# Patient Record
Sex: FEMALE | Race: BLACK OR AFRICAN AMERICAN | Employment: OTHER | URBAN - METROPOLITAN AREA
[De-identification: names, ages, dates, MRNs, and addresses within clinical notes are randomized per-mention and may not be internally consistent; named-entity substitution may affect disease eponyms.]

---

## 2017-01-01 ENCOUNTER — OFFICE VISIT (OUTPATIENT)
Dept: INTERNAL MEDICINE CLINIC | Age: 82
End: 2017-01-01

## 2017-01-01 ENCOUNTER — TELEPHONE (OUTPATIENT)
Dept: INTERNAL MEDICINE CLINIC | Age: 82
End: 2017-01-01

## 2017-01-01 ENCOUNTER — HOSPITAL ENCOUNTER (OUTPATIENT)
Dept: LAB | Age: 82
Discharge: HOME | End: 2017-01-17
Payer: MEDICARE

## 2017-01-01 ENCOUNTER — HOSPITAL ENCOUNTER (INPATIENT)
Age: 82
LOS: 5 days | Discharge: HOME | DRG: 189 | End: 2017-06-06
Attending: EMERGENCY MEDICINE | Admitting: INTERNAL MEDICINE
Payer: MEDICARE

## 2017-01-01 ENCOUNTER — OFFICE VISIT (OUTPATIENT)
Dept: RHEUMATOLOGY | Age: 82
End: 2017-01-01

## 2017-01-01 ENCOUNTER — HOSPITAL ENCOUNTER (EMERGENCY)
Age: 82
Discharge: HOME | End: 2017-08-30
Attending: EMERGENCY MEDICINE
Payer: MEDICARE

## 2017-01-01 ENCOUNTER — APPOINTMENT (OUTPATIENT)
Dept: GENERAL RADIOLOGY | Age: 82
DRG: 189 | End: 2017-01-01
Attending: EMERGENCY MEDICINE
Payer: MEDICARE

## 2017-01-01 ENCOUNTER — PATIENT OUTREACH (OUTPATIENT)
Dept: INTERNAL MEDICINE CLINIC | Age: 82
End: 2017-01-01

## 2017-01-01 ENCOUNTER — HOSPITAL ENCOUNTER (EMERGENCY)
Age: 82
Discharge: HOME | DRG: 189 | End: 2017-05-29
Attending: EMERGENCY MEDICINE
Payer: MEDICARE

## 2017-01-01 ENCOUNTER — APPOINTMENT (OUTPATIENT)
Dept: INTERNAL MEDICINE CLINIC | Age: 82
End: 2017-01-01

## 2017-01-01 ENCOUNTER — APPOINTMENT (OUTPATIENT)
Dept: GENERAL RADIOLOGY | Age: 82
DRG: 871 | End: 2017-01-01
Attending: INTERNAL MEDICINE
Payer: MEDICARE

## 2017-01-01 ENCOUNTER — HOSPITAL ENCOUNTER (EMERGENCY)
Age: 82
Discharge: HOME | End: 2017-09-29
Attending: EMERGENCY MEDICINE
Payer: MEDICARE

## 2017-01-01 ENCOUNTER — APPOINTMENT (OUTPATIENT)
Dept: ULTRASOUND IMAGING | Age: 82
DRG: 871 | End: 2017-01-01
Attending: INTERNAL MEDICINE
Payer: MEDICARE

## 2017-01-01 ENCOUNTER — APPOINTMENT (OUTPATIENT)
Dept: GENERAL RADIOLOGY | Age: 82
End: 2017-01-01
Attending: EMERGENCY MEDICINE
Payer: MEDICARE

## 2017-01-01 ENCOUNTER — APPOINTMENT (OUTPATIENT)
Dept: GENERAL RADIOLOGY | Age: 82
DRG: 871 | End: 2017-01-01
Attending: EMERGENCY MEDICINE
Payer: MEDICARE

## 2017-01-01 ENCOUNTER — CLINICAL SUPPORT (OUTPATIENT)
Dept: INTERNAL MEDICINE CLINIC | Age: 82
End: 2017-01-01

## 2017-01-01 ENCOUNTER — HOSPITAL ENCOUNTER (INPATIENT)
Age: 82
LOS: 4 days | DRG: 871 | End: 2017-11-17
Attending: EMERGENCY MEDICINE | Admitting: INTERNAL MEDICINE
Payer: MEDICARE

## 2017-01-01 ENCOUNTER — HOSPITAL ENCOUNTER (OUTPATIENT)
Dept: LAB | Age: 82
Discharge: HOME | End: 2017-06-22
Payer: MEDICARE

## 2017-01-01 ENCOUNTER — LAB ONLY (OUTPATIENT)
Dept: INTERNAL MEDICINE CLINIC | Age: 82
End: 2017-01-01

## 2017-01-01 VITALS
SYSTOLIC BLOOD PRESSURE: 110 MMHG | HEIGHT: 62 IN | WEIGHT: 156.31 LBS | RESPIRATION RATE: 16 BRPM | TEMPERATURE: 98.5 F | HEART RATE: 65 BPM | DIASTOLIC BLOOD PRESSURE: 75 MMHG | OXYGEN SATURATION: 95 % | BODY MASS INDEX: 28.76 KG/M2

## 2017-01-01 VITALS
BODY MASS INDEX: 26.66 KG/M2 | SYSTOLIC BLOOD PRESSURE: 132 MMHG | OXYGEN SATURATION: 95 % | HEART RATE: 73 BPM | HEIGHT: 65 IN | DIASTOLIC BLOOD PRESSURE: 66 MMHG | WEIGHT: 160 LBS | TEMPERATURE: 97 F | RESPIRATION RATE: 14 BRPM

## 2017-01-01 VITALS
WEIGHT: 157 LBS | TEMPERATURE: 97.5 F | OXYGEN SATURATION: 94 % | SYSTOLIC BLOOD PRESSURE: 168 MMHG | HEART RATE: 91 BPM | RESPIRATION RATE: 16 BRPM | BODY MASS INDEX: 26.16 KG/M2 | HEIGHT: 65 IN | DIASTOLIC BLOOD PRESSURE: 81 MMHG

## 2017-01-01 VITALS
OXYGEN SATURATION: 97 % | WEIGHT: 140 LBS | HEIGHT: 65 IN | HEART RATE: 76 BPM | BODY MASS INDEX: 23.32 KG/M2 | TEMPERATURE: 97.8 F | SYSTOLIC BLOOD PRESSURE: 145 MMHG | DIASTOLIC BLOOD PRESSURE: 48 MMHG | RESPIRATION RATE: 16 BRPM

## 2017-01-01 VITALS
DIASTOLIC BLOOD PRESSURE: 63 MMHG | OXYGEN SATURATION: 94 % | TEMPERATURE: 97.7 F | WEIGHT: 154.6 LBS | SYSTOLIC BLOOD PRESSURE: 134 MMHG | HEART RATE: 87 BPM | RESPIRATION RATE: 18 BRPM | BODY MASS INDEX: 25.76 KG/M2 | HEIGHT: 65 IN

## 2017-01-01 VITALS
TEMPERATURE: 98.9 F | SYSTOLIC BLOOD PRESSURE: 122 MMHG | BODY MASS INDEX: 26.56 KG/M2 | WEIGHT: 159.39 LBS | HEIGHT: 65 IN | DIASTOLIC BLOOD PRESSURE: 67 MMHG | OXYGEN SATURATION: 92 % | RESPIRATION RATE: 24 BRPM | HEART RATE: 126 BPM

## 2017-01-01 VITALS
OXYGEN SATURATION: 98 % | BODY MASS INDEX: 25.1 KG/M2 | WEIGHT: 159.9 LBS | SYSTOLIC BLOOD PRESSURE: 127 MMHG | HEIGHT: 67 IN | HEART RATE: 131 BPM | RESPIRATION RATE: 12 BRPM | TEMPERATURE: 97.8 F | DIASTOLIC BLOOD PRESSURE: 64 MMHG

## 2017-01-01 VITALS
WEIGHT: 157.5 LBS | SYSTOLIC BLOOD PRESSURE: 124 MMHG | BODY MASS INDEX: 26.24 KG/M2 | HEART RATE: 74 BPM | TEMPERATURE: 98.5 F | DIASTOLIC BLOOD PRESSURE: 57 MMHG | HEIGHT: 65 IN | OXYGEN SATURATION: 96 % | RESPIRATION RATE: 14 BRPM

## 2017-01-01 VITALS
DIASTOLIC BLOOD PRESSURE: 51 MMHG | RESPIRATION RATE: 14 BRPM | BODY MASS INDEX: 25.41 KG/M2 | TEMPERATURE: 97.4 F | WEIGHT: 152.5 LBS | SYSTOLIC BLOOD PRESSURE: 125 MMHG | OXYGEN SATURATION: 95 % | HEART RATE: 75 BPM | HEIGHT: 65 IN

## 2017-01-01 VITALS
RESPIRATION RATE: 18 BRPM | HEART RATE: 84 BPM | HEIGHT: 62 IN | OXYGEN SATURATION: 98 % | SYSTOLIC BLOOD PRESSURE: 158 MMHG | WEIGHT: 147 LBS | DIASTOLIC BLOOD PRESSURE: 67 MMHG | BODY MASS INDEX: 27.05 KG/M2 | TEMPERATURE: 98.1 F

## 2017-01-01 VITALS
OXYGEN SATURATION: 99 % | TEMPERATURE: 96.9 F | BODY MASS INDEX: 26.66 KG/M2 | DIASTOLIC BLOOD PRESSURE: 58 MMHG | RESPIRATION RATE: 16 BRPM | HEIGHT: 65 IN | SYSTOLIC BLOOD PRESSURE: 142 MMHG | WEIGHT: 160 LBS | HEART RATE: 68 BPM

## 2017-01-01 DIAGNOSIS — I25.10 CORONARY ARTERY DISEASE INVOLVING NATIVE CORONARY ARTERY OF NATIVE HEART, ANGINA PRESENCE UNSPECIFIED: ICD-10-CM

## 2017-01-01 DIAGNOSIS — J06.9 ACUTE UPPER RESPIRATORY INFECTION: Primary | ICD-10-CM

## 2017-01-01 DIAGNOSIS — N39.41 URGE INCONTINENCE OF URINE: Primary | ICD-10-CM

## 2017-01-01 DIAGNOSIS — Z86.73 CEREBROVASCULAR DISEASE, ARTERIOSCLEROTIC, POST-STROKE: ICD-10-CM

## 2017-01-01 DIAGNOSIS — Z23 ENCOUNTER FOR IMMUNIZATION: ICD-10-CM

## 2017-01-01 DIAGNOSIS — Z00.00 MEDICARE ANNUAL WELLNESS VISIT, SUBSEQUENT: Primary | ICD-10-CM

## 2017-01-01 DIAGNOSIS — I25.5 ISCHEMIC CARDIOMYOPATHY: ICD-10-CM

## 2017-01-01 DIAGNOSIS — R32 URINARY INCONTINENCE, UNSPECIFIED TYPE: Primary | ICD-10-CM

## 2017-01-01 DIAGNOSIS — E78.00 HYPERCHOLESTEROLEMIA: ICD-10-CM

## 2017-01-01 DIAGNOSIS — R32 URINARY INCONTINENCE, UNSPECIFIED TYPE: ICD-10-CM

## 2017-01-01 DIAGNOSIS — M85.80 OSTEOPENIA: Primary | ICD-10-CM

## 2017-01-01 DIAGNOSIS — J45.41 REACTIVE AIRWAY DISEASE, MODERATE PERSISTENT, WITH ACUTE EXACERBATION: ICD-10-CM

## 2017-01-01 DIAGNOSIS — N18.30 BENIGN HYPERTENSION WITH CHRONIC KIDNEY DISEASE, STAGE III (HCC): Primary | ICD-10-CM

## 2017-01-01 DIAGNOSIS — Z13.5 SCREENING FOR GLAUCOMA: ICD-10-CM

## 2017-01-01 DIAGNOSIS — D64.9 ANEMIA, UNSPECIFIED TYPE: ICD-10-CM

## 2017-01-01 DIAGNOSIS — I12.9 BENIGN HYPERTENSION WITH CHRONIC KIDNEY DISEASE, STAGE III (HCC): Primary | ICD-10-CM

## 2017-01-01 DIAGNOSIS — J18.9 COMMUNITY ACQUIRED PNEUMONIA OF RIGHT LOWER LOBE OF LUNG: Primary | ICD-10-CM

## 2017-01-01 DIAGNOSIS — R35.0 URINARY FREQUENCY: Primary | ICD-10-CM

## 2017-01-01 DIAGNOSIS — I67.2 CEREBROVASCULAR DISEASE, ARTERIOSCLEROTIC, POST-STROKE: ICD-10-CM

## 2017-01-01 DIAGNOSIS — J96.01 ACUTE RESPIRATORY FAILURE WITH HYPOXIA (HCC): ICD-10-CM

## 2017-01-01 DIAGNOSIS — R73.02 GLUCOSE INTOLERANCE (IMPAIRED GLUCOSE TOLERANCE): ICD-10-CM

## 2017-01-01 DIAGNOSIS — I50.22 CHRONIC SYSTOLIC CONGESTIVE HEART FAILURE (HCC): ICD-10-CM

## 2017-01-01 DIAGNOSIS — J20.9 ACUTE BRONCHITIS, UNSPECIFIED ORGANISM: Primary | ICD-10-CM

## 2017-01-01 DIAGNOSIS — M1A.39X1 CHRONIC GOUT DUE TO RENAL IMPAIRMENT OF MULTIPLE SITES WITH TOPHUS: ICD-10-CM

## 2017-01-01 DIAGNOSIS — J20.9 ACUTE BRONCHITIS, UNSPECIFIED ORGANISM: ICD-10-CM

## 2017-01-01 DIAGNOSIS — N95.9 POST MENOPAUSAL PROBLEMS: ICD-10-CM

## 2017-01-01 DIAGNOSIS — M10.9 GOUT OF MULTIPLE SITES, UNSPECIFIED CAUSE, UNSPECIFIED CHRONICITY: ICD-10-CM

## 2017-01-01 DIAGNOSIS — R52 BODY ACHES: Primary | ICD-10-CM

## 2017-01-01 DIAGNOSIS — A41.9 SEPSIS, DUE TO UNSPECIFIED ORGANISM: ICD-10-CM

## 2017-01-01 DIAGNOSIS — R35.0 URINARY FREQUENCY: ICD-10-CM

## 2017-01-01 DIAGNOSIS — N18.9 CKD (CHRONIC KIDNEY DISEASE), UNSPECIFIED STAGE: ICD-10-CM

## 2017-01-01 DIAGNOSIS — M94.0 COSTOCHONDRITIS: ICD-10-CM

## 2017-01-01 DIAGNOSIS — Z71.89 ADVANCE CARE PLANNING: Primary | ICD-10-CM

## 2017-01-01 LAB
ALBUMIN SERPL BCP-MCNC: 3.5 G/DL (ref 3.5–5)
ALBUMIN SERPL BCP-MCNC: 3.8 G/DL (ref 3.5–5)
ALBUMIN SERPL-MCNC: 1.9 G/DL (ref 3.5–5)
ALBUMIN SERPL-MCNC: 2.9 G/DL (ref 3.5–5)
ALBUMIN SERPL-MCNC: 3.4 G/DL (ref 3.5–5)
ALBUMIN SERPL-MCNC: 3.5 G/DL (ref 3.2–4.6)
ALBUMIN/GLOB SERPL: 0.4 {RATIO} (ref 1.1–2.2)
ALBUMIN/GLOB SERPL: 0.5 {RATIO} (ref 1.1–2.2)
ALBUMIN/GLOB SERPL: 0.7 {RATIO} (ref 1.1–2.2)
ALBUMIN/GLOB SERPL: 0.8 {RATIO} (ref 1.1–2.2)
ALBUMIN/GLOB SERPL: 0.8 {RATIO} (ref 1.1–2.2)
ALBUMIN/GLOB SERPL: 1.3 {RATIO} (ref 1.2–2.2)
ALP SERPL-CCNC: 114 U/L (ref 45–117)
ALP SERPL-CCNC: 77 U/L (ref 45–117)
ALP SERPL-CCNC: 77 U/L (ref 45–117)
ALP SERPL-CCNC: 79 IU/L (ref 39–117)
ALP SERPL-CCNC: 88 U/L (ref 45–117)
ALP SERPL-CCNC: 88 U/L (ref 45–117)
ALT SERPL-CCNC: 14 U/L (ref 12–78)
ALT SERPL-CCNC: 16 IU/L (ref 0–32)
ALT SERPL-CCNC: 21 U/L (ref 12–78)
ALT SERPL-CCNC: 23 U/L (ref 12–78)
ALT SERPL-CCNC: 26 U/L (ref 12–78)
ALT SERPL-CCNC: 82 U/L (ref 12–78)
AMORPH CRY URNS QL MICRO: ABNORMAL
ANION GAP BLD CALC-SCNC: 4 MMOL/L (ref 5–15)
ANION GAP BLD CALC-SCNC: 7 MMOL/L (ref 5–15)
ANION GAP BLD CALC-SCNC: 7 MMOL/L (ref 5–15)
ANION GAP BLD CALC-SCNC: 8 MMOL/L (ref 5–15)
ANION GAP BLD CALC-SCNC: 9 MMOL/L (ref 5–15)
ANION GAP SERPL CALC-SCNC: 12 MMOL/L (ref 5–15)
ANION GAP SERPL CALC-SCNC: 4 MMOL/L (ref 5–15)
ANION GAP SERPL CALC-SCNC: 8 MMOL/L (ref 5–15)
ANION GAP SERPL CALC-SCNC: 9 MMOL/L (ref 5–15)
APPEARANCE UR: ABNORMAL
APPEARANCE UR: CLEAR
AST SERPL W P-5'-P-CCNC: 26 U/L (ref 15–37)
AST SERPL W P-5'-P-CCNC: 35 U/L (ref 15–37)
AST SERPL-CCNC: 163 U/L (ref 15–37)
AST SERPL-CCNC: 22 U/L (ref 15–37)
AST SERPL-CCNC: 25 IU/L (ref 0–40)
AST SERPL-CCNC: 29 U/L (ref 15–37)
ATRIAL RATE: 101 BPM
ATRIAL RATE: 111 BPM
ATRIAL RATE: 66 BPM
ATRIAL RATE: 72 BPM
ATRIAL RATE: 87 BPM
BACTERIA #/AREA URNS HPF: ABNORMAL /[HPF]
BACTERIA SPEC CULT: ABNORMAL
BACTERIA SPEC CULT: NORMAL
BACTERIA UA POCT, BACTPOCT: NORMAL
BACTERIA URNS QL MICRO: ABNORMAL /HPF
BACTERIA URNS QL MICRO: NEGATIVE /HPF
BACTERIA URNS QL MICRO: NEGATIVE /HPF
BASOPHILS # BLD AUTO: 0 K/UL
BASOPHILS # BLD AUTO: 0 K/UL (ref 0–0.1)
BASOPHILS # BLD AUTO: 0 X10E3/UL (ref 0–0.2)
BASOPHILS # BLD: 0 %
BASOPHILS # BLD: 0 % (ref 0–1)
BASOPHILS # BLD: 0 K/UL (ref 0–0.1)
BASOPHILS NFR BLD AUTO: 1 %
BASOPHILS NFR BLD: 0 % (ref 0–1)
BASOPHILS NFR BLD: 0 % (ref 0–1)
BASOPHILS NFR BLD: 1 % (ref 0–1)
BILIRUB SERPL-MCNC: 0.2 MG/DL (ref 0.2–1)
BILIRUB SERPL-MCNC: 0.4 MG/DL (ref 0.2–1)
BILIRUB SERPL-MCNC: 0.7 MG/DL (ref 0–1.2)
BILIRUB SERPL-MCNC: 1.1 MG/DL (ref 0.2–1)
BILIRUB UR QL CFM: NEGATIVE
BILIRUB UR QL STRIP: NEGATIVE
BILIRUB UR QL: NEGATIVE
BILIRUB UR QL: NEGATIVE
BNP SERPL-MCNC: 4582 PG/ML (ref 0–450)
BNP SERPL-MCNC: 7032 PG/ML (ref 0–450)
BUN SERPL-MCNC: 32 MG/DL (ref 10–36)
BUN SERPL-MCNC: 41 MG/DL (ref 6–20)
BUN SERPL-MCNC: 42 MG/DL (ref 10–36)
BUN SERPL-MCNC: 45 MG/DL (ref 6–20)
BUN SERPL-MCNC: 50 MG/DL (ref 8–27)
BUN SERPL-MCNC: 53 MG/DL (ref 6–20)
BUN SERPL-MCNC: 57 MG/DL (ref 6–20)
BUN SERPL-MCNC: 62 MG/DL (ref 6–20)
BUN SERPL-MCNC: 64 MG/DL (ref 6–20)
BUN SERPL-MCNC: 71 MG/DL (ref 6–20)
BUN SERPL-MCNC: 74 MG/DL (ref 6–20)
BUN SERPL-MCNC: 85 MG/DL (ref 6–20)
BUN SERPL-MCNC: 86 MG/DL (ref 6–20)
BUN SERPL-MCNC: 93 MG/DL (ref 6–20)
BUN/CREAT SERPL: 17 (ref 12–28)
BUN/CREAT SERPL: 19 (ref 12–20)
BUN/CREAT SERPL: 20 (ref 12–20)
BUN/CREAT SERPL: 20 (ref 12–20)
BUN/CREAT SERPL: 22 (ref 12–20)
BUN/CREAT SERPL: 22 (ref 12–28)
BUN/CREAT SERPL: 23 (ref 11–26)
BUN/CREAT SERPL: 23 (ref 12–20)
BUN/CREAT SERPL: 25 (ref 12–20)
BUN/CREAT SERPL: 27 (ref 12–20)
BUN/CREAT SERPL: 28 (ref 12–20)
BUN/CREAT SERPL: 31 (ref 12–20)
BUN/CREAT SERPL: 36 (ref 12–20)
BUN/CREAT SERPL: 37 (ref 12–20)
CALCIUM SERPL-MCNC: 8.2 MG/DL (ref 8.5–10.1)
CALCIUM SERPL-MCNC: 8.4 MG/DL (ref 8.5–10.1)
CALCIUM SERPL-MCNC: 8.6 MG/DL (ref 8.5–10.1)
CALCIUM SERPL-MCNC: 8.6 MG/DL (ref 8.5–10.1)
CALCIUM SERPL-MCNC: 8.7 MG/DL (ref 8.5–10.1)
CALCIUM SERPL-MCNC: 8.8 MG/DL (ref 8.5–10.1)
CALCIUM SERPL-MCNC: 8.9 MG/DL (ref 8.7–10.3)
CALCIUM SERPL-MCNC: 9 MG/DL (ref 8.5–10.1)
CALCIUM SERPL-MCNC: 9.2 MG/DL (ref 8.5–10.1)
CALCIUM SERPL-MCNC: 9.4 MG/DL (ref 8.5–10.1)
CALCIUM SERPL-MCNC: 9.6 MG/DL (ref 8.5–10.1)
CALCIUM SERPL-MCNC: 9.6 MG/DL (ref 8.7–10.3)
CALCIUM SERPL-MCNC: 9.8 MG/DL (ref 8.7–10.3)
CALCIUM SERPL-MCNC: 9.9 MG/DL (ref 8.5–10.1)
CALCULATED P AXIS, ECG09: 35 DEGREES
CALCULATED P AXIS, ECG09: 39 DEGREES
CALCULATED P AXIS, ECG09: 52 DEGREES
CALCULATED P AXIS, ECG09: 64 DEGREES
CALCULATED R AXIS, ECG10: -30 DEGREES
CALCULATED R AXIS, ECG10: -36 DEGREES
CALCULATED R AXIS, ECG10: -37 DEGREES
CALCULATED R AXIS, ECG10: -40 DEGREES
CALCULATED R AXIS, ECG10: 11 DEGREES
CALCULATED T AXIS, ECG11: 110 DEGREES
CALCULATED T AXIS, ECG11: 116 DEGREES
CALCULATED T AXIS, ECG11: 118 DEGREES
CALCULATED T AXIS, ECG11: 84 DEGREES
CALCULATED T AXIS, ECG11: 96 DEGREES
CASTS UA POCT: NORMAL
CASTS URNS MICRO: ABNORMAL
CASTS URNS QL MICRO: PRESENT /LPF
CC UR VC: ABNORMAL
CHLORIDE SERPL-SCNC: 100 MMOL/L (ref 96–106)
CHLORIDE SERPL-SCNC: 101 MMOL/L (ref 96–106)
CHLORIDE SERPL-SCNC: 102 MMOL/L (ref 97–108)
CHLORIDE SERPL-SCNC: 103 MMOL/L (ref 97–108)
CHLORIDE SERPL-SCNC: 104 MMOL/L (ref 96–106)
CHLORIDE SERPL-SCNC: 104 MMOL/L (ref 97–108)
CHLORIDE SERPL-SCNC: 104 MMOL/L (ref 97–108)
CHLORIDE SERPL-SCNC: 105 MMOL/L (ref 97–108)
CHLORIDE SERPL-SCNC: 106 MMOL/L (ref 97–108)
CHLORIDE SERPL-SCNC: 106 MMOL/L (ref 97–108)
CHLORIDE SERPL-SCNC: 108 MMOL/L (ref 97–108)
CHLORIDE SERPL-SCNC: 108 MMOL/L (ref 97–108)
CHLORIDE SERPL-SCNC: 99 MMOL/L (ref 97–108)
CHLORIDE SERPL-SCNC: 99 MMOL/L (ref 97–108)
CHOLEST SERPL-MCNC: 154 MG/DL (ref 100–199)
CK MB CFR SERPL CALC: 1.9 % (ref 0–2.5)
CK MB CFR SERPL CALC: 3.8 % (ref 0–2.5)
CK MB SERPL-MCNC: 2.7 NG/ML (ref 5–25)
CK MB SERPL-MCNC: 4.2 NG/ML (ref 5–25)
CK SERPL-CCNC: 108 U/L (ref 26–192)
CK SERPL-CCNC: 111 U/L (ref 26–192)
CK SERPL-CCNC: 120 U/L (ref 26–192)
CK SERPL-CCNC: 139 U/L (ref 26–192)
CLUE CELLS, CLUEPOCT: NORMAL
CO2 SERPL-SCNC: 20 MMOL/L (ref 18–29)
CO2 SERPL-SCNC: 23 MMOL/L (ref 21–32)
CO2 SERPL-SCNC: 24 MMOL/L (ref 18–29)
CO2 SERPL-SCNC: 24 MMOL/L (ref 21–32)
CO2 SERPL-SCNC: 25 MMOL/L (ref 18–29)
CO2 SERPL-SCNC: 25 MMOL/L (ref 21–32)
CO2 SERPL-SCNC: 26 MMOL/L (ref 21–32)
CO2 SERPL-SCNC: 27 MMOL/L (ref 21–32)
CO2 SERPL-SCNC: 27 MMOL/L (ref 21–32)
CO2 SERPL-SCNC: 29 MMOL/L (ref 21–32)
CO2 SERPL-SCNC: 29 MMOL/L (ref 21–32)
CO2 SERPL-SCNC: 32 MMOL/L (ref 21–32)
COLOR UR: ABNORMAL
COLOR UR: YELLOW
CREAT SERPL-MCNC: 1.87 MG/DL (ref 0.57–1)
CREAT SERPL-MCNC: 1.91 MG/DL (ref 0.57–1)
CREAT SERPL-MCNC: 2 MG/DL (ref 0.55–1.02)
CREAT SERPL-MCNC: 2.05 MG/DL (ref 0.55–1.02)
CREAT SERPL-MCNC: 2.14 MG/DL (ref 0.57–1)
CREAT SERPL-MCNC: 2.19 MG/DL (ref 0.55–1.02)
CREAT SERPL-MCNC: 2.22 MG/DL (ref 0.55–1.02)
CREAT SERPL-MCNC: 2.26 MG/DL (ref 0.55–1.02)
CREAT SERPL-MCNC: 2.33 MG/DL (ref 0.55–1.02)
CREAT SERPL-MCNC: 2.5 MG/DL (ref 0.55–1.02)
CREAT SERPL-MCNC: 2.65 MG/DL (ref 0.55–1.02)
CREAT SERPL-MCNC: 2.87 MG/DL (ref 0.55–1.02)
CREAT SERPL-MCNC: 3.2 MG/DL (ref 0.55–1.02)
CREAT SERPL-MCNC: 4.15 MG/DL (ref 0.55–1.02)
CRYSTALS UA POCT, CRYSPOCT: NORMAL
DIAGNOSIS, 93000: NORMAL
DIFFERENTIAL METHOD BLD: ABNORMAL
EOSINOPHIL # BLD AUTO: 0.2 X10E3/UL (ref 0–0.4)
EOSINOPHIL # BLD: 0 K/UL (ref 0–0.4)
EOSINOPHIL # BLD: 0 K/UL (ref 0–0.4)
EOSINOPHIL # BLD: 0.2 K/UL
EOSINOPHIL # BLD: 0.2 K/UL (ref 0–0.4)
EOSINOPHIL # BLD: 0.2 K/UL (ref 0–0.4)
EOSINOPHIL NFR BLD AUTO: 5 %
EOSINOPHIL NFR BLD: 0 % (ref 0–7)
EOSINOPHIL NFR BLD: 0 % (ref 0–7)
EOSINOPHIL NFR BLD: 2 %
EOSINOPHIL NFR BLD: 4 % (ref 0–7)
EOSINOPHIL NFR BLD: 5 % (ref 0–7)
EPI CELLS #/AREA URNS HPF: ABNORMAL /HPF
EPITH CASTS URNS QL MICRO: ABNORMAL /LPF
EPITHELIAL CELLS POCT: NORMAL
ERYTHROCYTE [DISTWIDTH] IN BLOOD BY AUTOMATED COUNT: 14.2 % (ref 11.5–14.5)
ERYTHROCYTE [DISTWIDTH] IN BLOOD BY AUTOMATED COUNT: 14.3 % (ref 11.5–14.5)
ERYTHROCYTE [DISTWIDTH] IN BLOOD BY AUTOMATED COUNT: 14.3 % (ref 11.5–14.5)
ERYTHROCYTE [DISTWIDTH] IN BLOOD BY AUTOMATED COUNT: 14.4 % (ref 11.5–14.5)
ERYTHROCYTE [DISTWIDTH] IN BLOOD BY AUTOMATED COUNT: 14.5 % (ref 11.5–14.5)
ERYTHROCYTE [DISTWIDTH] IN BLOOD BY AUTOMATED COUNT: 14.7 % (ref 12.3–15.4)
ERYTHROCYTE [DISTWIDTH] IN BLOOD BY AUTOMATED COUNT: 14.9 % (ref 11.5–14.5)
ERYTHROCYTE [DISTWIDTH] IN BLOOD BY AUTOMATED COUNT: 15 % (ref 11.5–14.5)
ERYTHROCYTE [DISTWIDTH] IN BLOOD BY AUTOMATED COUNT: 15.3 % (ref 11.5–14.5)
EST. AVERAGE GLUCOSE BLD GHB EST-MCNC: 131 MG/DL
GLOBULIN SER CALC-MCNC: 2.7 G/DL (ref 1.5–4.5)
GLOBULIN SER CALC-MCNC: 4.1 G/DL (ref 2–4)
GLOBULIN SER CALC-MCNC: 4.4 G/DL (ref 2–4)
GLOBULIN SER CALC-MCNC: 4.6 G/DL (ref 2–4)
GLOBULIN SER CALC-MCNC: 5.1 G/DL (ref 2–4)
GLOBULIN SER CALC-MCNC: 5.9 G/DL (ref 2–4)
GLUCOSE BLD STRIP.AUTO-MCNC: 141 MG/DL (ref 65–100)
GLUCOSE BLD STRIP.AUTO-MCNC: 217 MG/DL (ref 65–100)
GLUCOSE SERPL-MCNC: 103 MG/DL (ref 65–99)
GLUCOSE SERPL-MCNC: 120 MG/DL (ref 65–100)
GLUCOSE SERPL-MCNC: 124 MG/DL (ref 65–100)
GLUCOSE SERPL-MCNC: 124 MG/DL (ref 65–100)
GLUCOSE SERPL-MCNC: 127 MG/DL (ref 65–100)
GLUCOSE SERPL-MCNC: 128 MG/DL (ref 65–100)
GLUCOSE SERPL-MCNC: 165 MG/DL (ref 65–100)
GLUCOSE SERPL-MCNC: 229 MG/DL (ref 65–100)
GLUCOSE SERPL-MCNC: 240 MG/DL (ref 65–100)
GLUCOSE SERPL-MCNC: 79 MG/DL (ref 65–100)
GLUCOSE SERPL-MCNC: 92 MG/DL (ref 65–99)
GLUCOSE SERPL-MCNC: 94 MG/DL (ref 65–100)
GLUCOSE SERPL-MCNC: 94 MG/DL (ref 65–100)
GLUCOSE SERPL-MCNC: 95 MG/DL (ref 65–99)
GLUCOSE UR QL: NEGATIVE
GLUCOSE UR STRIP.AUTO-MCNC: NEGATIVE MG/DL
GLUCOSE UR-MCNC: NEGATIVE MG/DL
GLUCOSE UR-MCNC: NEGATIVE MG/DL
HBA1C MFR BLD: 6.2 % (ref 4.8–5.6)
HCT VFR BLD AUTO: 28.5 % (ref 35–47)
HCT VFR BLD AUTO: 28.9 % (ref 35–47)
HCT VFR BLD AUTO: 30.8 % (ref 35–47)
HCT VFR BLD AUTO: 31.6 % (ref 35–47)
HCT VFR BLD AUTO: 31.8 % (ref 35–47)
HCT VFR BLD AUTO: 32.4 % (ref 34–46.6)
HCT VFR BLD AUTO: 32.8 % (ref 35–47)
HCT VFR BLD AUTO: 34.4 % (ref 35–47)
HCT VFR BLD AUTO: 38 % (ref 35–47)
HDLC SERPL-MCNC: 51 MG/DL
HGB BLD-MCNC: 10 G/DL (ref 11.5–16)
HGB BLD-MCNC: 10 G/DL (ref 11.5–16)
HGB BLD-MCNC: 10.1 G/DL (ref 11.5–16)
HGB BLD-MCNC: 10.3 G/DL (ref 11.1–15.9)
HGB BLD-MCNC: 10.9 G/DL (ref 11.5–16)
HGB BLD-MCNC: 12 G/DL (ref 11.5–16)
HGB BLD-MCNC: 8.7 G/DL (ref 11.5–16)
HGB BLD-MCNC: 8.8 G/DL (ref 11.5–16)
HGB BLD-MCNC: 9.9 G/DL (ref 11.5–16)
HGB UR QL STRIP: NEGATIVE
HYALINE CASTS URNS QL MICRO: ABNORMAL /LPF (ref 0–5)
IMM GRANULOCYTES # BLD: 0 X10E3/UL (ref 0–0.1)
IMM GRANULOCYTES NFR BLD: 0 %
INTERPRETATION, 910389: NORMAL
INTERPRETATION: NORMAL
KETONES P FAST UR STRIP-MCNC: NEGATIVE MG/DL
KETONES P FAST UR STRIP-MCNC: NEGATIVE MG/DL
KETONES UR QL STRIP.AUTO: NEGATIVE MG/DL
KETONES UR QL STRIP: NEGATIVE
L PNEUMO1 AG UR QL IA: POSITIVE
LACTATE SERPL-SCNC: 0.7 MMOL/L (ref 0.4–2)
LACTATE SERPL-SCNC: 0.8 MMOL/L (ref 0.4–2)
LACTATE SERPL-SCNC: 2.9 MMOL/L (ref 0.4–2)
LDLC SERPL CALC-MCNC: 72 MG/DL (ref 0–99)
LEUKOCYTE ESTERASE UR QL STRIP.AUTO: ABNORMAL
LEUKOCYTE ESTERASE UR QL STRIP.AUTO: ABNORMAL
LEUKOCYTE ESTERASE UR QL STRIP.AUTO: NEGATIVE
LEUKOCYTE ESTERASE UR QL STRIP: NEGATIVE
LYMPHOCYTES # BLD AUTO: 1.7 X10E3/UL (ref 0.7–3.1)
LYMPHOCYTES # BLD AUTO: 10 %
LYMPHOCYTES # BLD AUTO: 23 % (ref 12–49)
LYMPHOCYTES # BLD: 0.8 K/UL
LYMPHOCYTES # BLD: 1 K/UL (ref 0.8–3.5)
LYMPHOCYTES # BLD: 1.1 K/UL (ref 0.8–3.5)
LYMPHOCYTES # BLD: 1.8 K/UL (ref 0.8–3.5)
LYMPHOCYTES # BLD: 2 K/UL (ref 0.8–3.5)
LYMPHOCYTES NFR BLD AUTO: 42 %
LYMPHOCYTES NFR BLD: 15 % (ref 12–49)
LYMPHOCYTES NFR BLD: 48 % (ref 12–49)
LYMPHOCYTES NFR BLD: 9 % (ref 12–49)
MAGNESIUM SERPL-MCNC: 2 MG/DL (ref 1.6–2.3)
MAGNESIUM SERPL-MCNC: 2.4 MG/DL (ref 1.6–2.4)
MAGNESIUM SERPL-MCNC: 2.6 MG/DL (ref 1.6–2.4)
MCH RBC QN AUTO: 29.1 PG (ref 26–34)
MCH RBC QN AUTO: 29.2 PG (ref 26–34)
MCH RBC QN AUTO: 29.2 PG (ref 26–34)
MCH RBC QN AUTO: 30.2 PG (ref 26.6–33)
MCH RBC QN AUTO: 31 PG (ref 26–34)
MCH RBC QN AUTO: 31.2 PG (ref 26–34)
MCH RBC QN AUTO: 31.2 PG (ref 26–34)
MCH RBC QN AUTO: 31.3 PG (ref 26–34)
MCH RBC QN AUTO: 31.9 PG (ref 26–34)
MCHC RBC AUTO-ENTMCNC: 30.4 G/DL (ref 30–36.5)
MCHC RBC AUTO-ENTMCNC: 30.5 G/DL (ref 30–36.5)
MCHC RBC AUTO-ENTMCNC: 30.5 G/DL (ref 30–36.5)
MCHC RBC AUTO-ENTMCNC: 31.6 G/DL (ref 30–36.5)
MCHC RBC AUTO-ENTMCNC: 31.6 G/DL (ref 30–36.5)
MCHC RBC AUTO-ENTMCNC: 31.7 G/DL (ref 30–36.5)
MCHC RBC AUTO-ENTMCNC: 31.8 G/DL (ref 30–36.5)
MCHC RBC AUTO-ENTMCNC: 31.8 G/DL (ref 31.5–35.7)
MCHC RBC AUTO-ENTMCNC: 32.1 G/DL (ref 30–36.5)
MCV RBC AUTO: 100.6 FL (ref 80–99)
MCV RBC AUTO: 95 FL (ref 79–97)
MCV RBC AUTO: 95.3 FL (ref 80–99)
MCV RBC AUTO: 95.6 FL (ref 80–99)
MCV RBC AUTO: 96 FL (ref 80–99)
MCV RBC AUTO: 97.2 FL (ref 80–99)
MCV RBC AUTO: 97.5 FL (ref 80–99)
MCV RBC AUTO: 98.4 FL (ref 80–99)
MCV RBC AUTO: 99.2 FL (ref 80–99)
MICRO URNS: ABNORMAL
MONOCYTES # BLD AUTO: 0.2 X10E3/UL (ref 0.1–0.9)
MONOCYTES # BLD: 0.4 K/UL (ref 0–1)
MONOCYTES # BLD: 0.4 K/UL (ref 0–1)
MONOCYTES # BLD: 0.6 K/UL
MONOCYTES # BLD: 0.8 K/UL (ref 0–1)
MONOCYTES # BLD: 1.1 K/UL (ref 0–1)
MONOCYTES NFR BLD AUTO: 5 %
MONOCYTES NFR BLD AUTO: 8 %
MONOCYTES NFR BLD AUTO: 9 % (ref 5–13)
MONOCYTES NFR BLD: 7 % (ref 5–13)
MONOCYTES NFR BLD: 8 % (ref 5–13)
MONOCYTES NFR BLD: 9 % (ref 5–13)
MUCOUS THREADS URNS QL MICRO: PRESENT
MUCUS UA POCT, MUCPOCT: NORMAL
NEUTROPHILS # BLD AUTO: 1.9 X10E3/UL (ref 1.4–7)
NEUTROPHILS NFR BLD AUTO: 47 %
NEUTS SEG # BLD: 1.4 K/UL (ref 1.8–8)
NEUTS SEG # BLD: 10.6 K/UL (ref 1.8–8)
NEUTS SEG # BLD: 3 K/UL (ref 1.8–8)
NEUTS SEG # BLD: 6.1 K/UL
NEUTS SEG # BLD: 9 K/UL (ref 1.8–8)
NEUTS SEG NFR BLD AUTO: 64 % (ref 32–75)
NEUTS SEG NFR BLD AUTO: 80 %
NEUTS SEG NFR BLD: 37 % (ref 32–75)
NEUTS SEG NFR BLD: 77 % (ref 32–75)
NEUTS SEG NFR BLD: 84 % (ref 32–75)
NITRITE UR QL STRIP.AUTO: NEGATIVE
NITRITE UR QL STRIP: NEGATIVE
P-R INTERVAL, ECG05: 204 MS
P-R INTERVAL, ECG05: 208 MS
P-R INTERVAL, ECG05: 216 MS
P-R INTERVAL, ECG05: 234 MS
P-R INTERVAL, ECG05: 250 MS
PDF IMAGE, 910387: NORMAL
PH UR STRIP: 5 [PH] (ref 5–8)
PH UR STRIP: 5 [PH] (ref 5–8)
PH UR STRIP: 5.5 [PH] (ref 4.6–8)
PH UR STRIP: 5.5 [PH] (ref 4.6–8)
PH UR STRIP: 5.5 [PH] (ref 5–8)
PH UR STRIP: 7.5 [PH] (ref 5–7.5)
PLATELET # BLD AUTO: 147 K/UL (ref 150–400)
PLATELET # BLD AUTO: 151 K/UL (ref 150–400)
PLATELET # BLD AUTO: 160 K/UL (ref 150–400)
PLATELET # BLD AUTO: 166 K/UL (ref 150–400)
PLATELET # BLD AUTO: 167 K/UL (ref 150–400)
PLATELET # BLD AUTO: 170 X10E3/UL (ref 150–379)
PLATELET # BLD AUTO: 190 K/UL (ref 150–400)
PLATELET # BLD AUTO: 196 K/UL (ref 150–400)
PLATELET # BLD AUTO: 213 K/UL (ref 150–400)
POTASSIUM SERPL-SCNC: 3.6 MMOL/L (ref 3.5–5.1)
POTASSIUM SERPL-SCNC: 3.6 MMOL/L (ref 3.5–5.1)
POTASSIUM SERPL-SCNC: 4.3 MMOL/L (ref 3.5–5.1)
POTASSIUM SERPL-SCNC: 4.5 MMOL/L (ref 3.5–5.1)
POTASSIUM SERPL-SCNC: 4.5 MMOL/L (ref 3.5–5.2)
POTASSIUM SERPL-SCNC: 4.7 MMOL/L (ref 3.5–5.1)
POTASSIUM SERPL-SCNC: 4.8 MMOL/L (ref 3.5–5.1)
POTASSIUM SERPL-SCNC: 4.8 MMOL/L (ref 3.5–5.2)
POTASSIUM SERPL-SCNC: 4.9 MMOL/L (ref 3.5–5.1)
POTASSIUM SERPL-SCNC: 5 MMOL/L (ref 3.5–5.1)
POTASSIUM SERPL-SCNC: 5 MMOL/L (ref 3.5–5.1)
POTASSIUM SERPL-SCNC: 5.3 MMOL/L (ref 3.5–5.1)
POTASSIUM SERPL-SCNC: 5.3 MMOL/L (ref 3.5–5.1)
POTASSIUM SERPL-SCNC: 5.4 MMOL/L (ref 3.5–5.2)
PROT SERPL-MCNC: 6.2 G/DL (ref 6–8.5)
PROT SERPL-MCNC: 6.3 G/DL (ref 6.4–8.2)
PROT SERPL-MCNC: 7.5 G/DL (ref 6.4–8.2)
PROT SERPL-MCNC: 8.1 G/DL (ref 6.4–8.2)
PROT SERPL-MCNC: 8.8 G/DL (ref 6.4–8.2)
PROT SERPL-MCNC: 8.9 G/DL (ref 6.4–8.2)
PROT UR QL STRIP: ABNORMAL
PROT UR QL STRIP: NEGATIVE MG/DL
PROT UR QL STRIP: NORMAL MG/DL
PROT UR STRIP-MCNC: 30 MG/DL
PROT UR STRIP-MCNC: NEGATIVE MG/DL
PROT UR STRIP-MCNC: NEGATIVE MG/DL
Q-T INTERVAL, ECG07: 316 MS
Q-T INTERVAL, ECG07: 336 MS
Q-T INTERVAL, ECG07: 362 MS
Q-T INTERVAL, ECG07: 404 MS
Q-T INTERVAL, ECG07: 420 MS
QRS DURATION, ECG06: 104 MS
QRS DURATION, ECG06: 84 MS
QRS DURATION, ECG06: 86 MS
QRS DURATION, ECG06: 86 MS
QRS DURATION, ECG06: 88 MS
QTC CALCULATION (BEZET), ECG08: 423 MS
QTC CALCULATION (BEZET), ECG08: 429 MS
QTC CALCULATION (BEZET), ECG08: 435 MS
QTC CALCULATION (BEZET), ECG08: 435 MS
QTC CALCULATION (BEZET), ECG08: 459 MS
RBC # BLD AUTO: 2.98 M/UL (ref 3.8–5.2)
RBC # BLD AUTO: 3.01 M/UL (ref 3.8–5.2)
RBC # BLD AUTO: 3.17 M/UL (ref 3.8–5.2)
RBC # BLD AUTO: 3.21 M/UL (ref 3.8–5.2)
RBC # BLD AUTO: 3.26 M/UL (ref 3.8–5.2)
RBC # BLD AUTO: 3.41 X10E6/UL (ref 3.77–5.28)
RBC # BLD AUTO: 3.42 M/UL (ref 3.8–5.2)
RBC # BLD AUTO: 3.44 M/UL (ref 3.8–5.2)
RBC # BLD AUTO: 3.83 M/UL (ref 3.8–5.2)
RBC #/AREA URNS HPF: ABNORMAL /HPF
RBC #/AREA URNS HPF: ABNORMAL /HPF (ref 0–5)
RBC MORPH BLD: ABNORMAL
RBC UA POCT, RBCPOCT: NORMAL
SERVICE CMNT-IMP: ABNORMAL
SERVICE CMNT-IMP: NORMAL
SODIUM SERPL-SCNC: 133 MMOL/L (ref 136–145)
SODIUM SERPL-SCNC: 134 MMOL/L (ref 136–145)
SODIUM SERPL-SCNC: 135 MMOL/L (ref 136–145)
SODIUM SERPL-SCNC: 137 MMOL/L (ref 136–145)
SODIUM SERPL-SCNC: 139 MMOL/L (ref 136–145)
SODIUM SERPL-SCNC: 140 MMOL/L (ref 134–144)
SODIUM SERPL-SCNC: 140 MMOL/L (ref 136–145)
SODIUM SERPL-SCNC: 142 MMOL/L (ref 134–144)
SODIUM SERPL-SCNC: 142 MMOL/L (ref 134–144)
SODIUM SERPL-SCNC: 144 MMOL/L (ref 136–145)
SODIUM SERPL-SCNC: 145 MMOL/L (ref 136–145)
SP GR UR REFRACTOMETRY: 1.01 (ref 1–1.03)
SP GR UR REFRACTOMETRY: 1.01 (ref 1–1.03)
SP GR UR REFRACTOMETRY: 1.02 (ref 1–1.03)
SP GR UR STRIP: 1.01 (ref 1–1.03)
SP GR UR STRIP: 1.01 (ref 1–1.03)
SP GR UR: 1.02 (ref 1–1.03)
SPECIMEN SOURCE: NORMAL
TRICH UA POCT, TRICHPOC: NORMAL
TRIGL SERPL-MCNC: 154 MG/DL (ref 0–149)
TROPONIN I SERPL-MCNC: 0.04 NG/ML
TROPONIN I SERPL-MCNC: 0.06 NG/ML
TROPONIN I SERPL-MCNC: 0.15 NG/ML
TROPONIN I SERPL-MCNC: 0.39 NG/ML
TROPONIN I SERPL-MCNC: 0.57 NG/ML
UA UROBILINOGEN AMB POC: NORMAL (ref 0.2–1)
UA UROBILINOGEN AMB POC: NORMAL (ref 0.2–1)
UA: UC IF INDICATED,UAUC: ABNORMAL
URINALYSIS CLARITY POC: CLEAR
URINALYSIS CLARITY POC: CLEAR
URINALYSIS COLOR POC: YELLOW
URINALYSIS COLOR POC: YELLOW
URINALYSIS REFLEX, 377202: ABNORMAL
URINE BLOOD POC: NEGATIVE
URINE BLOOD POC: NEGATIVE
URINE CULT COMMENT, POCT: NORMAL
URINE LEUKOCYTES POC: NEGATIVE
URINE LEUKOCYTES POC: NORMAL
URINE NITRITES POC: NEGATIVE
URINE NITRITES POC: NEGATIVE
UROBILINOGEN UR QL STRIP.AUTO: 0.2 EU/DL (ref 0.2–1)
UROBILINOGEN UR QL STRIP.AUTO: 0.2 EU/DL (ref 0.2–1)
UROBILINOGEN UR QL STRIP.AUTO: 1 EU/DL (ref 0.2–1)
UROBILINOGEN UR STRIP-MCNC: 1 MG/DL (ref 0.2–1)
VENTRICULAR RATE, ECG03: 101 BPM
VENTRICULAR RATE, ECG03: 111 BPM
VENTRICULAR RATE, ECG03: 66 BPM
VENTRICULAR RATE, ECG03: 72 BPM
VENTRICULAR RATE, ECG03: 87 BPM
VLDLC SERPL CALC-MCNC: 31 MG/DL (ref 5–40)
WBC # BLD AUTO: 10.8 K/UL (ref 3.6–11)
WBC # BLD AUTO: 13.8 K/UL (ref 3.6–11)
WBC # BLD AUTO: 3.7 K/UL (ref 3.6–11)
WBC # BLD AUTO: 4 X10E3/UL (ref 3.4–10.8)
WBC # BLD AUTO: 4.7 K/UL (ref 3.6–11)
WBC # BLD AUTO: 5.4 K/UL (ref 3.6–11)
WBC # BLD AUTO: 6.9 K/UL (ref 3.6–11)
WBC # BLD AUTO: 7.7 K/UL (ref 3.6–11)
WBC # BLD AUTO: 8.3 K/UL (ref 3.6–11)
WBC #/AREA URNS HPF: ABNORMAL /HPF
WBC UA POCT, WBCPOCT: NORMAL
WBC URNS QL MICRO: ABNORMAL /HPF (ref 0–4)
YEAST UA POCT, YEASTPOC: NORMAL

## 2017-01-01 PROCEDURE — 74011250636 HC RX REV CODE- 250/636: Performed by: INTERNAL MEDICINE

## 2017-01-01 PROCEDURE — 83036 HEMOGLOBIN GLYCOSYLATED A1C: CPT

## 2017-01-01 PROCEDURE — 74011250637 HC RX REV CODE- 250/637: Performed by: INTERNAL MEDICINE

## 2017-01-01 PROCEDURE — 83880 ASSAY OF NATRIURETIC PEPTIDE: CPT | Performed by: EMERGENCY MEDICINE

## 2017-01-01 PROCEDURE — 85027 COMPLETE CBC AUTOMATED: CPT | Performed by: HOSPITALIST

## 2017-01-01 PROCEDURE — 94640 AIRWAY INHALATION TREATMENT: CPT

## 2017-01-01 PROCEDURE — 87449 NOS EACH ORGANISM AG IA: CPT | Performed by: INTERNAL MEDICINE

## 2017-01-01 PROCEDURE — 84484 ASSAY OF TROPONIN QUANT: CPT | Performed by: EMERGENCY MEDICINE

## 2017-01-01 PROCEDURE — 65660000000 HC RM CCU STEPDOWN

## 2017-01-01 PROCEDURE — 82550 ASSAY OF CK (CPK): CPT | Performed by: EMERGENCY MEDICINE

## 2017-01-01 PROCEDURE — 74011636637 HC RX REV CODE- 636/637: Performed by: EMERGENCY MEDICINE

## 2017-01-01 PROCEDURE — 36415 COLL VENOUS BLD VENIPUNCTURE: CPT | Performed by: EMERGENCY MEDICINE

## 2017-01-01 PROCEDURE — 74011000258 HC RX REV CODE- 258: Performed by: INTERNAL MEDICINE

## 2017-01-01 PROCEDURE — 74011250637 HC RX REV CODE- 250/637: Performed by: HOSPITALIST

## 2017-01-01 PROCEDURE — 94761 N-INVAS EAR/PLS OXIMETRY MLT: CPT

## 2017-01-01 PROCEDURE — 74011000258 HC RX REV CODE- 258: Performed by: HOSPITALIST

## 2017-01-01 PROCEDURE — 36415 COLL VENOUS BLD VENIPUNCTURE: CPT | Performed by: HOSPITALIST

## 2017-01-01 PROCEDURE — 74011250636 HC RX REV CODE- 250/636: Performed by: EMERGENCY MEDICINE

## 2017-01-01 PROCEDURE — 77010033678 HC OXYGEN DAILY

## 2017-01-01 PROCEDURE — 97165 OT EVAL LOW COMPLEX 30 MIN: CPT | Performed by: OCCUPATIONAL THERAPIST

## 2017-01-01 PROCEDURE — 81001 URINALYSIS AUTO W/SCOPE: CPT

## 2017-01-01 PROCEDURE — C1751 CATH, INF, PER/CENT/MIDLINE: HCPCS

## 2017-01-01 PROCEDURE — 51701 INSERT BLADDER CATHETER: CPT

## 2017-01-01 PROCEDURE — 85025 COMPLETE CBC W/AUTO DIFF WBC: CPT | Performed by: EMERGENCY MEDICINE

## 2017-01-01 PROCEDURE — 97161 PT EVAL LOW COMPLEX 20 MIN: CPT

## 2017-01-01 PROCEDURE — 74011000258 HC RX REV CODE- 258: Performed by: EMERGENCY MEDICINE

## 2017-01-01 PROCEDURE — 80053 COMPREHEN METABOLIC PANEL: CPT | Performed by: EMERGENCY MEDICINE

## 2017-01-01 PROCEDURE — G8979 MOBILITY GOAL STATUS: HCPCS

## 2017-01-01 PROCEDURE — 74011250637 HC RX REV CODE- 250/637: Performed by: EMERGENCY MEDICINE

## 2017-01-01 PROCEDURE — 36415 COLL VENOUS BLD VENIPUNCTURE: CPT | Performed by: INTERNAL MEDICINE

## 2017-01-01 PROCEDURE — 87040 BLOOD CULTURE FOR BACTERIA: CPT | Performed by: EMERGENCY MEDICINE

## 2017-01-01 PROCEDURE — 74011000250 HC RX REV CODE- 250: Performed by: EMERGENCY MEDICINE

## 2017-01-01 PROCEDURE — 80061 LIPID PANEL: CPT

## 2017-01-01 PROCEDURE — 80048 BASIC METABOLIC PNL TOTAL CA: CPT | Performed by: HOSPITALIST

## 2017-01-01 PROCEDURE — 80048 BASIC METABOLIC PNL TOTAL CA: CPT | Performed by: INTERNAL MEDICINE

## 2017-01-01 PROCEDURE — 80048 BASIC METABOLIC PNL TOTAL CA: CPT

## 2017-01-01 PROCEDURE — 71020 XR CHEST PA LAT: CPT

## 2017-01-01 PROCEDURE — 74011000250 HC RX REV CODE- 250: Performed by: INTERNAL MEDICINE

## 2017-01-01 PROCEDURE — 85027 COMPLETE CBC AUTOMATED: CPT | Performed by: EMERGENCY MEDICINE

## 2017-01-01 PROCEDURE — 94002 VENT MGMT INPAT INIT DAY: CPT

## 2017-01-01 PROCEDURE — 84484 ASSAY OF TROPONIN QUANT: CPT | Performed by: INTERNAL MEDICINE

## 2017-01-01 PROCEDURE — 74011250636 HC RX REV CODE- 250/636: Performed by: HOSPITALIST

## 2017-01-01 PROCEDURE — 83735 ASSAY OF MAGNESIUM: CPT | Performed by: INTERNAL MEDICINE

## 2017-01-01 PROCEDURE — 97116 GAIT TRAINING THERAPY: CPT

## 2017-01-01 PROCEDURE — 77030011256 HC DRSG MEPILEX <16IN NO BORD MOLN -A

## 2017-01-01 PROCEDURE — 77030032490 HC SLV COMPR SCD KNE COVD -B

## 2017-01-01 PROCEDURE — 77030005563 HC CATH URETH INT MMGH -A

## 2017-01-01 PROCEDURE — 77030029684 HC NEB SM VOL KT MONA -A

## 2017-01-01 PROCEDURE — 87086 URINE CULTURE/COLONY COUNT: CPT | Performed by: EMERGENCY MEDICINE

## 2017-01-01 PROCEDURE — 96365 THER/PROPH/DIAG IV INF INIT: CPT

## 2017-01-01 PROCEDURE — 85027 COMPLETE CBC AUTOMATED: CPT | Performed by: INTERNAL MEDICINE

## 2017-01-01 PROCEDURE — G8980 MOBILITY D/C STATUS: HCPCS

## 2017-01-01 PROCEDURE — 87077 CULTURE AEROBIC IDENTIFY: CPT | Performed by: EMERGENCY MEDICINE

## 2017-01-01 PROCEDURE — 81001 URINALYSIS AUTO W/SCOPE: CPT | Performed by: EMERGENCY MEDICINE

## 2017-01-01 PROCEDURE — G8978 MOBILITY CURRENT STATUS: HCPCS

## 2017-01-01 PROCEDURE — 96375 TX/PRO/DX INJ NEW DRUG ADDON: CPT

## 2017-01-01 PROCEDURE — 36415 COLL VENOUS BLD VENIPUNCTURE: CPT

## 2017-01-01 PROCEDURE — 97162 PT EVAL MOD COMPLEX 30 MIN: CPT

## 2017-01-01 PROCEDURE — 80053 COMPREHEN METABOLIC PANEL: CPT

## 2017-01-01 PROCEDURE — 99285 EMERGENCY DEPT VISIT HI MDM: CPT

## 2017-01-01 PROCEDURE — P9047 ALBUMIN (HUMAN), 25%, 50ML: HCPCS | Performed by: INTERNAL MEDICINE

## 2017-01-01 PROCEDURE — 87186 SC STD MICRODIL/AGAR DIL: CPT | Performed by: EMERGENCY MEDICINE

## 2017-01-01 PROCEDURE — 83735 ASSAY OF MAGNESIUM: CPT | Performed by: EMERGENCY MEDICINE

## 2017-01-01 PROCEDURE — 5A12012 PERFORMANCE OF CARDIAC OUTPUT, SINGLE, MANUAL: ICD-10-PCS | Performed by: ANESTHESIOLOGY

## 2017-01-01 PROCEDURE — 85025 COMPLETE CBC W/AUTO DIFF WBC: CPT

## 2017-01-01 PROCEDURE — 71010 XR CHEST PORT: CPT

## 2017-01-01 PROCEDURE — 76770 US EXAM ABDO BACK WALL COMP: CPT

## 2017-01-01 PROCEDURE — 82962 GLUCOSE BLOOD TEST: CPT

## 2017-01-01 PROCEDURE — 94664 DEMO&/EVAL PT USE INHALER: CPT

## 2017-01-01 PROCEDURE — 77030038269 HC DRN EXT URIN PURWCK BARD -A

## 2017-01-01 PROCEDURE — A9270 NON-COVERED ITEM OR SERVICE: HCPCS | Performed by: EMERGENCY MEDICINE

## 2017-01-01 PROCEDURE — 83605 ASSAY OF LACTIC ACID: CPT | Performed by: INTERNAL MEDICINE

## 2017-01-01 PROCEDURE — 85025 COMPLETE CBC W/AUTO DIFF WBC: CPT | Performed by: INTERNAL MEDICINE

## 2017-01-01 PROCEDURE — 77030005538 HC CATH URETH FOL44 BARD -B

## 2017-01-01 PROCEDURE — 77030010547 HC BG URIN W/UMETER -A

## 2017-01-01 PROCEDURE — 02HV33Z INSERTION OF INFUSION DEVICE INTO SUPERIOR VENA CAVA, PERCUTANEOUS APPROACH: ICD-10-PCS | Performed by: ANESTHESIOLOGY

## 2017-01-01 PROCEDURE — 77030008771 HC TU NG SALEM SUMP -A

## 2017-01-01 PROCEDURE — 77030037878 HC DRSG MEPILEX >48IN BORD MOLN -B

## 2017-01-01 PROCEDURE — 93005 ELECTROCARDIOGRAM TRACING: CPT

## 2017-01-01 PROCEDURE — 74011636637 HC RX REV CODE- 636/637: Performed by: HOSPITALIST

## 2017-01-01 PROCEDURE — 83605 ASSAY OF LACTIC ACID: CPT | Performed by: EMERGENCY MEDICINE

## 2017-01-01 PROCEDURE — 36556 INSERT NON-TUNNEL CV CATH: CPT

## 2017-01-01 PROCEDURE — 99284 EMERGENCY DEPT VISIT MOD MDM: CPT

## 2017-01-01 PROCEDURE — 31500 INSERT EMERGENCY AIRWAY: CPT

## 2017-01-01 PROCEDURE — 83735 ASSAY OF MAGNESIUM: CPT

## 2017-01-01 PROCEDURE — 99283 EMERGENCY DEPT VISIT LOW MDM: CPT

## 2017-01-01 PROCEDURE — 5A1935Z RESPIRATORY VENTILATION, LESS THAN 24 CONSECUTIVE HOURS: ICD-10-PCS | Performed by: ANESTHESIOLOGY

## 2017-01-01 PROCEDURE — 77030008683 HC TU ET CUF COVD -A

## 2017-01-01 RX ORDER — CALCIUM CHLORIDE INJECTION 100 MG/ML
INJECTION, SOLUTION INTRAVENOUS
Status: COMPLETED | OUTPATIENT
Start: 2017-01-01 | End: 2017-01-01

## 2017-01-01 RX ORDER — HYDRALAZINE HYDROCHLORIDE 25 MG/1
37.5 TABLET, FILM COATED ORAL 3 TIMES DAILY
Status: DISCONTINUED | OUTPATIENT
Start: 2017-01-01 | End: 2017-01-01

## 2017-01-01 RX ORDER — IPRATROPIUM BROMIDE AND ALBUTEROL SULFATE 2.5; .5 MG/3ML; MG/3ML
3 SOLUTION RESPIRATORY (INHALATION)
Status: COMPLETED | OUTPATIENT
Start: 2017-01-01 | End: 2017-01-01

## 2017-01-01 RX ORDER — SODIUM CHLORIDE 0.9 % (FLUSH) 0.9 %
5-10 SYRINGE (ML) INJECTION AS NEEDED
Status: DISCONTINUED | OUTPATIENT
Start: 2017-01-01 | End: 2017-01-01 | Stop reason: HOSPADM

## 2017-01-01 RX ORDER — ALBUTEROL SULFATE 90 UG/1
2 AEROSOL, METERED RESPIRATORY (INHALATION)
COMMUNITY
End: 2017-01-01

## 2017-01-01 RX ORDER — AZITHROMYCIN 250 MG/1
250 TABLET, FILM COATED ORAL DAILY
Status: DISCONTINUED | OUTPATIENT
Start: 2017-01-01 | End: 2017-01-01 | Stop reason: HOSPADM

## 2017-01-01 RX ORDER — FUROSEMIDE 20 MG/1
20 TABLET ORAL DAILY
COMMUNITY
Start: 2017-01-01 | End: 2017-01-01

## 2017-01-01 RX ORDER — LEVOFLOXACIN 500 MG/1
500 TABLET, FILM COATED ORAL
Status: DISCONTINUED | OUTPATIENT
Start: 2017-01-01 | End: 2017-01-01

## 2017-01-01 RX ORDER — GUAIFENESIN/DEXTROMETHORPHAN 100-10MG/5
10 SYRUP ORAL
Qty: 473 ML | Refills: 0 | Status: SHIPPED | OUTPATIENT
Start: 2017-01-01 | End: 2017-01-01

## 2017-01-01 RX ORDER — METOPROLOL SUCCINATE 25 MG/1
12.5 TABLET, EXTENDED RELEASE ORAL DAILY
Status: DISCONTINUED | OUTPATIENT
Start: 2017-01-01 | End: 2017-01-01 | Stop reason: HOSPADM

## 2017-01-01 RX ORDER — ATORVASTATIN CALCIUM 20 MG/1
20 TABLET, FILM COATED ORAL DAILY
Status: DISCONTINUED | OUTPATIENT
Start: 2017-01-01 | End: 2017-01-01 | Stop reason: HOSPADM

## 2017-01-01 RX ORDER — PREDNISONE 20 MG/1
60 TABLET ORAL DAILY
Qty: 15 TAB | Refills: 0 | Status: SHIPPED | OUTPATIENT
Start: 2017-01-01 | End: 2017-01-01

## 2017-01-01 RX ORDER — CEPHALEXIN 500 MG/1
500 CAPSULE ORAL 4 TIMES DAILY
Qty: 28 CAP | Refills: 0 | Status: SHIPPED | OUTPATIENT
Start: 2017-01-01 | End: 2017-01-01

## 2017-01-01 RX ORDER — FUROSEMIDE 20 MG/1
20 TABLET ORAL DAILY
Status: DISCONTINUED | OUTPATIENT
Start: 2017-01-01 | End: 2017-01-01 | Stop reason: HOSPADM

## 2017-01-01 RX ORDER — DOCUSATE SODIUM 100 MG/1
100 CAPSULE, LIQUID FILLED ORAL
Status: DISCONTINUED | OUTPATIENT
Start: 2017-01-01 | End: 2017-01-01 | Stop reason: HOSPADM

## 2017-01-01 RX ORDER — GUAIFENESIN/DEXTROMETHORPHAN 100-10MG/5
10 SYRUP ORAL
Status: DISCONTINUED | OUTPATIENT
Start: 2017-01-01 | End: 2017-01-01 | Stop reason: HOSPADM

## 2017-01-01 RX ORDER — INSULIN LISPRO 100 [IU]/ML
INJECTION, SOLUTION INTRAVENOUS; SUBCUTANEOUS
Status: DISCONTINUED
Start: 2017-01-01 | End: 2017-01-01 | Stop reason: HOSPADM

## 2017-01-01 RX ORDER — SODIUM CHLORIDE 0.9 % (FLUSH) 0.9 %
5-10 SYRINGE (ML) INJECTION EVERY 8 HOURS
Status: DISCONTINUED | OUTPATIENT
Start: 2017-01-01 | End: 2017-01-01 | Stop reason: HOSPADM

## 2017-01-01 RX ORDER — ISOSORBIDE DINITRATE 20 MG/1
20 TABLET ORAL 3 TIMES DAILY
Status: DISCONTINUED | OUTPATIENT
Start: 2017-01-01 | End: 2017-01-01 | Stop reason: HOSPADM

## 2017-01-01 RX ORDER — IPRATROPIUM BROMIDE AND ALBUTEROL SULFATE 2.5; .5 MG/3ML; MG/3ML
3 SOLUTION RESPIRATORY (INHALATION)
Status: DISCONTINUED | OUTPATIENT
Start: 2017-01-01 | End: 2017-01-01 | Stop reason: HOSPADM

## 2017-01-01 RX ORDER — ALBUTEROL SULFATE 90 UG/1
2 AEROSOL, METERED RESPIRATORY (INHALATION)
Status: COMPLETED | OUTPATIENT
Start: 2017-01-01 | End: 2017-01-01

## 2017-01-01 RX ORDER — GUAIFENESIN 100 MG/5ML
81 LIQUID (ML) ORAL DAILY
Status: DISCONTINUED | OUTPATIENT
Start: 2017-01-01 | End: 2017-01-01 | Stop reason: HOSPADM

## 2017-01-01 RX ORDER — FEBUXOSTAT 80 MG/1
TABLET ORAL
Qty: 90 TAB | Refills: 0 | Status: SHIPPED | OUTPATIENT
Start: 2017-01-01 | End: 2017-01-01 | Stop reason: DRUGHIGH

## 2017-01-01 RX ORDER — ONDANSETRON 2 MG/ML
4 INJECTION INTRAMUSCULAR; INTRAVENOUS
Status: DISCONTINUED | OUTPATIENT
Start: 2017-01-01 | End: 2017-01-01 | Stop reason: HOSPADM

## 2017-01-01 RX ORDER — FUROSEMIDE 10 MG/ML
40 INJECTION INTRAMUSCULAR; INTRAVENOUS DAILY
Status: DISCONTINUED | OUTPATIENT
Start: 2017-01-01 | End: 2017-01-01

## 2017-01-01 RX ORDER — FUROSEMIDE 10 MG/ML
40 INJECTION INTRAMUSCULAR; INTRAVENOUS 2 TIMES DAILY
Status: DISCONTINUED | OUTPATIENT
Start: 2017-01-01 | End: 2017-01-01

## 2017-01-01 RX ORDER — GUAIFENESIN 600 MG/1
600 TABLET, EXTENDED RELEASE ORAL 2 TIMES DAILY
Status: DISCONTINUED | OUTPATIENT
Start: 2017-01-01 | End: 2017-01-01 | Stop reason: HOSPADM

## 2017-01-01 RX ORDER — EPINEPHRINE 0.1 MG/ML
INJECTION INTRACARDIAC; INTRAVENOUS
Status: COMPLETED | OUTPATIENT
Start: 2017-01-01 | End: 2017-01-01

## 2017-01-01 RX ORDER — FUROSEMIDE 10 MG/ML
20 INJECTION INTRAMUSCULAR; INTRAVENOUS DAILY
Status: DISCONTINUED | OUTPATIENT
Start: 2017-01-01 | End: 2017-01-01

## 2017-01-01 RX ORDER — ACETAMINOPHEN 325 MG/1
650 TABLET ORAL
Status: DISCONTINUED | OUTPATIENT
Start: 2017-01-01 | End: 2017-01-01 | Stop reason: HOSPADM

## 2017-01-01 RX ORDER — PROPOFOL 10 MG/ML
INJECTION, EMULSION INTRAVENOUS
Status: DISCONTINUED
Start: 2017-01-01 | End: 2017-01-01 | Stop reason: HOSPADM

## 2017-01-01 RX ORDER — DEXTROSE 50 % IN WATER (D50W) INTRAVENOUS SYRINGE
Status: COMPLETED | OUTPATIENT
Start: 2017-01-01 | End: 2017-01-01

## 2017-01-01 RX ORDER — NITROGLYCERIN 80 MG/1
1 PATCH TRANSDERMAL DAILY
Status: DISCONTINUED | OUTPATIENT
Start: 2017-01-01 | End: 2017-01-01 | Stop reason: HOSPADM

## 2017-01-01 RX ORDER — GUAIFENESIN 600 MG/1
600 TABLET, EXTENDED RELEASE ORAL 2 TIMES DAILY
Qty: 20 TAB | Refills: 0 | Status: SHIPPED | OUTPATIENT
Start: 2017-01-01 | End: 2017-01-01

## 2017-01-01 RX ORDER — HEPARIN SODIUM 5000 [USP'U]/ML
5000 INJECTION, SOLUTION INTRAVENOUS; SUBCUTANEOUS EVERY 12 HOURS
Status: DISCONTINUED | OUTPATIENT
Start: 2017-01-01 | End: 2017-01-01 | Stop reason: HOSPADM

## 2017-01-01 RX ORDER — MORPHINE SULFATE 2 MG/ML
4 INJECTION, SOLUTION INTRAMUSCULAR; INTRAVENOUS
Status: COMPLETED | OUTPATIENT
Start: 2017-01-01 | End: 2017-01-01

## 2017-01-01 RX ORDER — PREDNISONE 20 MG/1
40 TABLET ORAL
Status: DISCONTINUED | OUTPATIENT
Start: 2017-01-01 | End: 2017-01-01 | Stop reason: HOSPADM

## 2017-01-01 RX ORDER — SODIUM CHLORIDE 9 MG/ML
75 INJECTION, SOLUTION INTRAVENOUS CONTINUOUS
Status: DISCONTINUED | OUTPATIENT
Start: 2017-01-01 | End: 2017-01-01

## 2017-01-01 RX ORDER — FEBUXOSTAT 80 MG/1
80 TABLET, FILM COATED ORAL DAILY
COMMUNITY
End: 2017-01-01

## 2017-01-01 RX ORDER — CIPROFLOXACIN 500 MG/1
500 TABLET ORAL 2 TIMES DAILY
Qty: 14 TAB | Refills: 0 | Status: SHIPPED | OUTPATIENT
Start: 2017-01-01 | End: 2017-01-01

## 2017-01-01 RX ORDER — ACETAMINOPHEN 325 MG/1
650 TABLET ORAL
Qty: 40 TAB | Refills: 0 | Status: SHIPPED | OUTPATIENT
Start: 2017-01-01 | End: 2017-01-01

## 2017-01-01 RX ORDER — ACETAMINOPHEN 325 MG/1
650 TABLET ORAL
Status: COMPLETED | OUTPATIENT
Start: 2017-01-01 | End: 2017-01-01

## 2017-01-01 RX ORDER — PREDNISONE 1 MG/1
2 TABLET ORAL DAILY
Qty: 60 TAB | Refills: 3 | Status: SHIPPED | OUTPATIENT
Start: 2017-01-01 | End: 2017-01-01

## 2017-01-01 RX ORDER — HEPARIN SODIUM 5000 [USP'U]/ML
5000 INJECTION, SOLUTION INTRAVENOUS; SUBCUTANEOUS EVERY 8 HOURS
Status: DISCONTINUED | OUTPATIENT
Start: 2017-01-01 | End: 2017-01-01 | Stop reason: HOSPADM

## 2017-01-01 RX ORDER — LIDOCAINE HYDROCHLORIDE 10 MG/ML
1 INJECTION INFILTRATION; PERINEURAL ONCE
Qty: 1 VIAL | Refills: 0
Start: 2017-01-01 | End: 2017-01-01

## 2017-01-01 RX ORDER — IPRATROPIUM BROMIDE AND ALBUTEROL SULFATE 2.5; .5 MG/3ML; MG/3ML
SOLUTION RESPIRATORY (INHALATION)
Status: COMPLETED
Start: 2017-01-01 | End: 2017-01-01

## 2017-01-01 RX ORDER — LEVOFLOXACIN 750 MG/1
750 TABLET ORAL ONCE
Status: COMPLETED | OUTPATIENT
Start: 2017-01-01 | End: 2017-01-01

## 2017-01-01 RX ORDER — FUROSEMIDE 40 MG/1
40 TABLET ORAL
Status: COMPLETED | OUTPATIENT
Start: 2017-01-01 | End: 2017-01-01

## 2017-01-01 RX ORDER — ACETAMINOPHEN 325 MG/1
650 TABLET ORAL
Status: DISCONTINUED | OUTPATIENT
Start: 2017-01-01 | End: 2017-01-01 | Stop reason: SDUPTHER

## 2017-01-01 RX ORDER — IPRATROPIUM BROMIDE AND ALBUTEROL SULFATE 2.5; .5 MG/3ML; MG/3ML
3 SOLUTION RESPIRATORY (INHALATION) ONCE
Status: COMPLETED | OUTPATIENT
Start: 2017-01-01 | End: 2017-01-01

## 2017-01-01 RX ORDER — LANOLIN ALCOHOL/MO/W.PET/CERES
325 CREAM (GRAM) TOPICAL DAILY
Status: DISCONTINUED | OUTPATIENT
Start: 2017-01-01 | End: 2017-01-01 | Stop reason: HOSPADM

## 2017-01-01 RX ORDER — FEBUXOSTAT 40 MG/1
80 TABLET, FILM COATED ORAL DAILY
Status: DISCONTINUED | OUTPATIENT
Start: 2017-01-01 | End: 2017-01-01 | Stop reason: HOSPADM

## 2017-01-01 RX ORDER — PREDNISONE 20 MG/1
60 TABLET ORAL
Status: COMPLETED | OUTPATIENT
Start: 2017-01-01 | End: 2017-01-01

## 2017-01-01 RX ORDER — CEFUROXIME AXETIL 250 MG/1
250 TABLET ORAL 2 TIMES DAILY
Qty: 14 TAB | Refills: 0 | Status: SHIPPED | OUTPATIENT
Start: 2017-01-01 | End: 2017-01-01

## 2017-01-01 RX ORDER — FUROSEMIDE 20 MG/1
20 TABLET ORAL DAILY
Qty: 30 TAB | Refills: 1 | Status: SHIPPED | OUTPATIENT
Start: 2017-01-01 | End: 2017-01-01 | Stop reason: SDUPTHER

## 2017-01-01 RX ORDER — PREDNISONE 10 MG/1
10 TABLET ORAL
Qty: 24 TAB | Refills: 0 | Status: SHIPPED | OUTPATIENT
Start: 2017-01-01 | End: 2017-01-01 | Stop reason: ALTCHOICE

## 2017-01-01 RX ORDER — ALBUMIN HUMAN 250 G/1000ML
12.5 SOLUTION INTRAVENOUS ONCE
Status: COMPLETED | OUTPATIENT
Start: 2017-01-01 | End: 2017-01-01

## 2017-01-01 RX ORDER — AMIODARONE HYDROCHLORIDE 150 MG/3ML
INJECTION, SOLUTION INTRAVENOUS
Status: COMPLETED | OUTPATIENT
Start: 2017-01-01 | End: 2017-01-01

## 2017-01-01 RX ORDER — HEPARIN SODIUM 5000 [USP'U]/ML
5000 INJECTION, SOLUTION INTRAVENOUS; SUBCUTANEOUS EVERY 8 HOURS
Status: DISCONTINUED | OUTPATIENT
Start: 2017-01-01 | End: 2017-01-01

## 2017-01-01 RX ORDER — SODIUM BICARBONATE 1 MEQ/ML
SYRINGE (ML) INTRAVENOUS
Status: COMPLETED | OUTPATIENT
Start: 2017-01-01 | End: 2017-01-01

## 2017-01-01 RX ORDER — FEBUXOSTAT 80 MG/1
TABLET ORAL
Qty: 90 TAB | Refills: 0 | Status: SHIPPED | OUTPATIENT
Start: 2017-01-01 | End: 2017-01-01 | Stop reason: SDUPTHER

## 2017-01-01 RX ORDER — CIPROFLOXACIN 500 MG/1
500 TABLET ORAL
Status: COMPLETED | OUTPATIENT
Start: 2017-01-01 | End: 2017-01-01

## 2017-01-01 RX ORDER — FUROSEMIDE 40 MG/1
40 TABLET ORAL DAILY
Qty: 90 TAB | Refills: 3
Start: 2017-01-01 | End: 2017-01-01 | Stop reason: DRUGHIGH

## 2017-01-01 RX ORDER — SODIUM CHLORIDE 450 MG/100ML
100 INJECTION, SOLUTION INTRAVENOUS CONTINUOUS
Status: DISCONTINUED | OUTPATIENT
Start: 2017-01-01 | End: 2017-01-01

## 2017-01-01 RX ADMIN — METHYLPREDNISOLONE SODIUM SUCCINATE 40 MG: 40 INJECTION, POWDER, FOR SOLUTION INTRAMUSCULAR; INTRAVENOUS at 06:00

## 2017-01-01 RX ADMIN — IPRATROPIUM BROMIDE AND ALBUTEROL SULFATE 3 ML: .5; 3 SOLUTION RESPIRATORY (INHALATION) at 15:25

## 2017-01-01 RX ADMIN — ATORVASTATIN CALCIUM 20 MG: 20 TABLET, FILM COATED ORAL at 09:33

## 2017-01-01 RX ADMIN — ATORVASTATIN CALCIUM 20 MG: 20 TABLET, FILM COATED ORAL at 10:58

## 2017-01-01 RX ADMIN — Medication 10 ML: at 03:22

## 2017-01-01 RX ADMIN — DEXTROSE MONOHYDRATE 25 G: 25 INJECTION, SOLUTION INTRAVENOUS at 01:22

## 2017-01-01 RX ADMIN — ACETAMINOPHEN 650 MG: 325 TABLET, FILM COATED ORAL at 03:23

## 2017-01-01 RX ADMIN — ATORVASTATIN CALCIUM 20 MG: 20 TABLET, FILM COATED ORAL at 08:53

## 2017-01-01 RX ADMIN — Medication 10 ML: at 05:50

## 2017-01-01 RX ADMIN — ALBUMIN (HUMAN) 12.5 G: 0.25 INJECTION, SOLUTION INTRAVENOUS at 21:41

## 2017-01-01 RX ADMIN — GUAIFENESIN 600 MG: 600 TABLET, EXTENDED RELEASE ORAL at 12:13

## 2017-01-01 RX ADMIN — Medication 10 ML: at 20:01

## 2017-01-01 RX ADMIN — HEPARIN SODIUM 5000 UNITS: 5000 INJECTION, SOLUTION INTRAVENOUS; SUBCUTANEOUS at 21:09

## 2017-01-01 RX ADMIN — FEBUXOSTAT 80 MG: 40 TABLET ORAL at 09:54

## 2017-01-01 RX ADMIN — HYDRALAZINE HYDROCHLORIDE 37.5 MG: 25 TABLET, FILM COATED ORAL at 21:09

## 2017-01-01 RX ADMIN — ISOSORBIDE DINITRATE 20 MG: 10 TABLET ORAL at 08:55

## 2017-01-01 RX ADMIN — Medication 10 ML: at 15:29

## 2017-01-01 RX ADMIN — IPRATROPIUM BROMIDE AND ALBUTEROL SULFATE 3 ML: .5; 3 SOLUTION RESPIRATORY (INHALATION) at 09:00

## 2017-01-01 RX ADMIN — Medication 10 ML: at 17:24

## 2017-01-01 RX ADMIN — Medication 10 ML: at 06:00

## 2017-01-01 RX ADMIN — HEPARIN SODIUM 5000 UNITS: 5000 INJECTION, SOLUTION INTRAVENOUS; SUBCUTANEOUS at 05:00

## 2017-01-01 RX ADMIN — FERROUS SULFATE TAB 325 MG (65 MG ELEMENTAL FE) 325 MG: 325 (65 FE) TAB at 10:19

## 2017-01-01 RX ADMIN — HEPARIN SODIUM 5000 UNITS: 5000 INJECTION, SOLUTION INTRAVENOUS; SUBCUTANEOUS at 22:08

## 2017-01-01 RX ADMIN — FERROUS SULFATE TAB 325 MG (65 MG ELEMENTAL FE) 325 MG: 325 (65 FE) TAB at 09:29

## 2017-01-01 RX ADMIN — HEPARIN SODIUM 5000 UNITS: 5000 INJECTION, SOLUTION INTRAVENOUS; SUBCUTANEOUS at 12:57

## 2017-01-01 RX ADMIN — PREDNISONE 40 MG: 20 TABLET ORAL at 09:33

## 2017-01-01 RX ADMIN — CEFTRIAXONE 1 G: 1 INJECTION, POWDER, FOR SOLUTION INTRAMUSCULAR; INTRAVENOUS at 19:30

## 2017-01-01 RX ADMIN — IPRATROPIUM BROMIDE AND ALBUTEROL SULFATE 3 ML: .5; 3 SOLUTION RESPIRATORY (INHALATION) at 15:50

## 2017-01-01 RX ADMIN — ATORVASTATIN CALCIUM 20 MG: 20 TABLET, FILM COATED ORAL at 09:52

## 2017-01-01 RX ADMIN — HUMAN INSULIN 10 UNITS: 100 INJECTION, SOLUTION SUBCUTANEOUS at 01:22

## 2017-01-01 RX ADMIN — GUAIFENESIN 600 MG: 600 TABLET, EXTENDED RELEASE ORAL at 10:58

## 2017-01-01 RX ADMIN — HEPARIN SODIUM 5000 UNITS: 5000 INJECTION, SOLUTION INTRAVENOUS; SUBCUTANEOUS at 15:29

## 2017-01-01 RX ADMIN — GUAIFENESIN 600 MG: 600 TABLET, EXTENDED RELEASE ORAL at 18:59

## 2017-01-01 RX ADMIN — ASPIRIN 81 MG 81 MG: 81 TABLET ORAL at 08:53

## 2017-01-01 RX ADMIN — METOPROLOL SUCCINATE 12.5 MG: 25 TABLET, EXTENDED RELEASE ORAL at 10:18

## 2017-01-01 RX ADMIN — ATORVASTATIN CALCIUM 20 MG: 20 TABLET, FILM COATED ORAL at 10:01

## 2017-01-01 RX ADMIN — IPRATROPIUM BROMIDE AND ALBUTEROL SULFATE 3 ML: .5; 3 SOLUTION RESPIRATORY (INHALATION) at 01:57

## 2017-01-01 RX ADMIN — AZITHROMYCIN MONOHYDRATE 500 MG: 500 INJECTION, POWDER, LYOPHILIZED, FOR SOLUTION INTRAVENOUS at 21:30

## 2017-01-01 RX ADMIN — FERROUS SULFATE TAB 325 MG (65 MG ELEMENTAL FE) 325 MG: 325 (65 FE) TAB at 08:54

## 2017-01-01 RX ADMIN — IPRATROPIUM BROMIDE AND ALBUTEROL SULFATE 3 ML: .5; 3 SOLUTION RESPIRATORY (INHALATION) at 19:38

## 2017-01-01 RX ADMIN — GUAIFENESIN 600 MG: 600 TABLET, EXTENDED RELEASE ORAL at 17:42

## 2017-01-01 RX ADMIN — ATORVASTATIN CALCIUM 20 MG: 20 TABLET, FILM COATED ORAL at 10:20

## 2017-01-01 RX ADMIN — METHYLPREDNISOLONE SODIUM SUCCINATE 40 MG: 40 INJECTION, POWDER, FOR SOLUTION INTRAMUSCULAR; INTRAVENOUS at 05:45

## 2017-01-01 RX ADMIN — IPRATROPIUM BROMIDE AND ALBUTEROL SULFATE 3 ML: .5; 3 SOLUTION RESPIRATORY (INHALATION) at 01:31

## 2017-01-01 RX ADMIN — SODIUM BICARBONATE 50 MEQ: 84 INJECTION, SOLUTION INTRAVENOUS at 02:02

## 2017-01-01 RX ADMIN — IPRATROPIUM BROMIDE AND ALBUTEROL SULFATE 3 ML: .5; 3 SOLUTION RESPIRATORY (INHALATION) at 08:20

## 2017-01-01 RX ADMIN — HYDRALAZINE HYDROCHLORIDE 37.5 MG: 25 TABLET, FILM COATED ORAL at 17:44

## 2017-01-01 RX ADMIN — Medication 10 ML: at 22:54

## 2017-01-01 RX ADMIN — FUROSEMIDE 20 MG: 10 INJECTION, SOLUTION INTRAMUSCULAR; INTRAVENOUS at 10:02

## 2017-01-01 RX ADMIN — FEBUXOSTAT 80 MG: 40 TABLET ORAL at 08:54

## 2017-01-01 RX ADMIN — SODIUM CHLORIDE 100 ML/HR: 450 INJECTION, SOLUTION INTRAVENOUS at 11:25

## 2017-01-01 RX ADMIN — IPRATROPIUM BROMIDE AND ALBUTEROL SULFATE 3 ML: .5; 3 SOLUTION RESPIRATORY (INHALATION) at 01:25

## 2017-01-01 RX ADMIN — HEPARIN SODIUM 5000 UNITS: 5000 INJECTION, SOLUTION INTRAVENOUS; SUBCUTANEOUS at 03:21

## 2017-01-01 RX ADMIN — SODIUM CHLORIDE 2091 ML: 900 INJECTION, SOLUTION INTRAVENOUS at 10:16

## 2017-01-01 RX ADMIN — AMIODARONE HYDROCHLORIDE 150 MG: 50 INJECTION, SOLUTION INTRAVENOUS at 02:06

## 2017-01-01 RX ADMIN — IPRATROPIUM BROMIDE AND ALBUTEROL SULFATE 3 ML: .5; 3 SOLUTION RESPIRATORY (INHALATION) at 19:12

## 2017-01-01 RX ADMIN — METHYLPREDNISOLONE SODIUM SUCCINATE 40 MG: 40 INJECTION, POWDER, FOR SOLUTION INTRAMUSCULAR; INTRAVENOUS at 22:09

## 2017-01-01 RX ADMIN — HYDRALAZINE HYDROCHLORIDE 37.5 MG: 25 TABLET, FILM COATED ORAL at 10:19

## 2017-01-01 RX ADMIN — Medication 10 ML: at 21:32

## 2017-01-01 RX ADMIN — Medication 10 ML: at 19:44

## 2017-01-01 RX ADMIN — HEPARIN SODIUM 5000 UNITS: 5000 INJECTION, SOLUTION INTRAVENOUS; SUBCUTANEOUS at 20:54

## 2017-01-01 RX ADMIN — GUAIFENESIN 600 MG: 600 TABLET, EXTENDED RELEASE ORAL at 09:33

## 2017-01-01 RX ADMIN — METHYLPREDNISOLONE SODIUM SUCCINATE 40 MG: 40 INJECTION, POWDER, FOR SOLUTION INTRAMUSCULAR; INTRAVENOUS at 22:02

## 2017-01-01 RX ADMIN — IPRATROPIUM BROMIDE AND ALBUTEROL SULFATE 3 ML: .5; 3 SOLUTION RESPIRATORY (INHALATION) at 19:10

## 2017-01-01 RX ADMIN — ACETAMINOPHEN 650 MG: 325 TABLET ORAL at 09:00

## 2017-01-01 RX ADMIN — ATORVASTATIN CALCIUM 20 MG: 20 TABLET, FILM COATED ORAL at 09:29

## 2017-01-01 RX ADMIN — HEPARIN SODIUM 5000 UNITS: 5000 INJECTION, SOLUTION INTRAVENOUS; SUBCUTANEOUS at 04:41

## 2017-01-01 RX ADMIN — ATORVASTATIN CALCIUM 20 MG: 20 TABLET, FILM COATED ORAL at 12:11

## 2017-01-01 RX ADMIN — METHYLPREDNISOLONE SODIUM SUCCINATE 40 MG: 40 INJECTION, POWDER, FOR SOLUTION INTRAMUSCULAR; INTRAVENOUS at 21:32

## 2017-01-01 RX ADMIN — HEPARIN SODIUM 5000 UNITS: 5000 INJECTION, SOLUTION INTRAVENOUS; SUBCUTANEOUS at 05:50

## 2017-01-01 RX ADMIN — METHYLPREDNISOLONE SODIUM SUCCINATE 40 MG: 40 INJECTION, POWDER, FOR SOLUTION INTRAMUSCULAR; INTRAVENOUS at 15:28

## 2017-01-01 RX ADMIN — FEBUXOSTAT 80 MG: 40 TABLET ORAL at 10:18

## 2017-01-01 RX ADMIN — EPINEPHRINE 1 MG: 0.1 INJECTION, SOLUTION ENDOTRACHEAL; INTRACARDIAC; INTRAVENOUS at 02:15

## 2017-01-01 RX ADMIN — AZITHROMYCIN MONOHYDRATE 500 MG: 500 INJECTION, POWDER, LYOPHILIZED, FOR SOLUTION INTRAVENOUS at 21:44

## 2017-01-01 RX ADMIN — Medication 10 ML: at 09:35

## 2017-01-01 RX ADMIN — ISOSORBIDE DINITRATE 20 MG: 10 TABLET ORAL at 21:09

## 2017-01-01 RX ADMIN — METHYLPREDNISOLONE SODIUM SUCCINATE 40 MG: 40 INJECTION, POWDER, FOR SOLUTION INTRAMUSCULAR; INTRAVENOUS at 17:43

## 2017-01-01 RX ADMIN — HEPARIN SODIUM 5000 UNITS: 5000 INJECTION, SOLUTION INTRAVENOUS; SUBCUTANEOUS at 21:32

## 2017-01-01 RX ADMIN — ASPIRIN 81 MG 81 MG: 81 TABLET ORAL at 10:20

## 2017-01-01 RX ADMIN — IPRATROPIUM BROMIDE AND ALBUTEROL SULFATE 3 ML: 2.5; .5 SOLUTION RESPIRATORY (INHALATION) at 01:57

## 2017-01-01 RX ADMIN — AZITHROMYCIN MONOHYDRATE 500 MG: 500 INJECTION, POWDER, LYOPHILIZED, FOR SOLUTION INTRAVENOUS at 12:47

## 2017-01-01 RX ADMIN — ISOSORBIDE DINITRATE 20 MG: 10 TABLET ORAL at 21:16

## 2017-01-01 RX ADMIN — FUROSEMIDE 20 MG: 20 TABLET ORAL at 09:33

## 2017-01-01 RX ADMIN — AZITHROMYCIN MONOHYDRATE 500 MG: 500 INJECTION, POWDER, LYOPHILIZED, FOR SOLUTION INTRAVENOUS at 22:10

## 2017-01-01 RX ADMIN — CALCIUM CHLORIDE 1 G: 100 INJECTION, SOLUTION INTRAVENOUS at 01:17

## 2017-01-01 RX ADMIN — SODIUM BICARBONATE 50 MEQ: 84 INJECTION, SOLUTION INTRAVENOUS at 01:22

## 2017-01-01 RX ADMIN — CEFTRIAXONE 1 G: 1 INJECTION, POWDER, FOR SOLUTION INTRAMUSCULAR; INTRAVENOUS at 18:27

## 2017-01-01 RX ADMIN — FEBUXOSTAT 80 MG: 40 TABLET ORAL at 12:09

## 2017-01-01 RX ADMIN — ISOSORBIDE DINITRATE 20 MG: 10 TABLET ORAL at 23:34

## 2017-01-01 RX ADMIN — ISOSORBIDE DINITRATE 20 MG: 10 TABLET ORAL at 17:57

## 2017-01-01 RX ADMIN — IPRATROPIUM BROMIDE AND ALBUTEROL SULFATE 3 ML: .5; 3 SOLUTION RESPIRATORY (INHALATION) at 02:36

## 2017-01-01 RX ADMIN — IPRATROPIUM BROMIDE AND ALBUTEROL SULFATE 3 ML: .5; 3 SOLUTION RESPIRATORY (INHALATION) at 14:26

## 2017-01-01 RX ADMIN — PREDNISONE 60 MG: 20 TABLET ORAL at 14:52

## 2017-01-01 RX ADMIN — IPRATROPIUM BROMIDE AND ALBUTEROL SULFATE 3 ML: .5; 3 SOLUTION RESPIRATORY (INHALATION) at 14:25

## 2017-01-01 RX ADMIN — IPRATROPIUM BROMIDE AND ALBUTEROL SULFATE 3 ML: .5; 3 SOLUTION RESPIRATORY (INHALATION) at 20:00

## 2017-01-01 RX ADMIN — IPRATROPIUM BROMIDE AND ALBUTEROL SULFATE 3 ML: .5; 3 SOLUTION RESPIRATORY (INHALATION) at 14:14

## 2017-01-01 RX ADMIN — IPRATROPIUM BROMIDE AND ALBUTEROL SULFATE 3 ML: .5; 3 SOLUTION RESPIRATORY (INHALATION) at 20:54

## 2017-01-01 RX ADMIN — HEPARIN SODIUM 5000 UNITS: 5000 INJECTION, SOLUTION INTRAVENOUS; SUBCUTANEOUS at 12:40

## 2017-01-01 RX ADMIN — IPRATROPIUM BROMIDE AND ALBUTEROL SULFATE 3 ML: .5; 3 SOLUTION RESPIRATORY (INHALATION) at 01:48

## 2017-01-01 RX ADMIN — AZITHROMYCIN MONOHYDRATE 500 MG: 500 INJECTION, POWDER, LYOPHILIZED, FOR SOLUTION INTRAVENOUS at 12:29

## 2017-01-01 RX ADMIN — ACETAMINOPHEN 650 MG: 325 TABLET ORAL at 10:20

## 2017-01-01 RX ADMIN — HEPARIN SODIUM 5000 UNITS: 5000 INJECTION, SOLUTION INTRAVENOUS; SUBCUTANEOUS at 12:46

## 2017-01-01 RX ADMIN — ISOSORBIDE DINITRATE: 10 TABLET ORAL at 10:03

## 2017-01-01 RX ADMIN — Medication 10 ML: at 21:10

## 2017-01-01 RX ADMIN — SODIUM BICARBONATE 50 MEQ: 84 INJECTION, SOLUTION INTRAVENOUS at 01:17

## 2017-01-01 RX ADMIN — Medication 10 ML: at 10:04

## 2017-01-01 RX ADMIN — ISOSORBIDE DINITRATE 20 MG: 10 TABLET ORAL at 10:21

## 2017-01-01 RX ADMIN — HEPARIN SODIUM 5000 UNITS: 5000 INJECTION, SOLUTION INTRAVENOUS; SUBCUTANEOUS at 21:15

## 2017-01-01 RX ADMIN — GUAIFENESIN 600 MG: 600 TABLET, EXTENDED RELEASE ORAL at 10:01

## 2017-01-01 RX ADMIN — MULTIPLE VITAMINS W/ MINERALS TAB 1 TABLET: TAB at 09:29

## 2017-01-01 RX ADMIN — CIPROFLOXACIN HYDROCHLORIDE 500 MG: 500 TABLET, FILM COATED ORAL at 14:51

## 2017-01-01 RX ADMIN — CEFTRIAXONE SODIUM 2 G: 2 INJECTION, POWDER, FOR SOLUTION INTRAMUSCULAR; INTRAVENOUS at 10:38

## 2017-01-01 RX ADMIN — Medication 10 ML: at 05:43

## 2017-01-01 RX ADMIN — HYDRALAZINE HYDROCHLORIDE 37.5 MG: 25 TABLET, FILM COATED ORAL at 08:56

## 2017-01-01 RX ADMIN — SODIUM CHLORIDE 100 ML/HR: 450 INJECTION, SOLUTION INTRAVENOUS at 00:16

## 2017-01-01 RX ADMIN — HYDRALAZINE HYDROCHLORIDE 37.5 MG: 25 TABLET, FILM COATED ORAL at 17:57

## 2017-01-01 RX ADMIN — METOPROLOL SUCCINATE 12.5 MG: 25 TABLET, EXTENDED RELEASE ORAL at 08:57

## 2017-01-01 RX ADMIN — MULTIPLE VITAMINS W/ MINERALS TAB 1 TABLET: TAB at 08:58

## 2017-01-01 RX ADMIN — HEPARIN SODIUM 5000 UNITS: 5000 INJECTION, SOLUTION INTRAVENOUS; SUBCUTANEOUS at 05:27

## 2017-01-01 RX ADMIN — Medication 10 ML: at 15:28

## 2017-01-01 RX ADMIN — ISOSORBIDE DINITRATE 20 MG: 10 TABLET ORAL at 21:25

## 2017-01-01 RX ADMIN — ISOSORBIDE DINITRATE: 10 TABLET ORAL at 09:33

## 2017-01-01 RX ADMIN — AZITHROMYCIN MONOHYDRATE 500 MG: 500 INJECTION, POWDER, LYOPHILIZED, FOR SOLUTION INTRAVENOUS at 20:54

## 2017-01-01 RX ADMIN — METHYLPREDNISOLONE SODIUM SUCCINATE 40 MG: 40 INJECTION, POWDER, FOR SOLUTION INTRAMUSCULAR; INTRAVENOUS at 22:00

## 2017-01-01 RX ADMIN — Medication 10 ML: at 21:16

## 2017-01-01 RX ADMIN — METOPROLOL SUCCINATE 12.5 MG: 25 TABLET, EXTENDED RELEASE ORAL at 10:02

## 2017-01-01 RX ADMIN — METHYLPREDNISOLONE SODIUM SUCCINATE 40 MG: 40 INJECTION, POWDER, FOR SOLUTION INTRAMUSCULAR; INTRAVENOUS at 05:41

## 2017-01-01 RX ADMIN — AZITHROMYCIN MONOHYDRATE 500 MG: 500 INJECTION, POWDER, LYOPHILIZED, FOR SOLUTION INTRAVENOUS at 22:52

## 2017-01-01 RX ADMIN — METHYLPREDNISOLONE SODIUM SUCCINATE 40 MG: 40 INJECTION, POWDER, FOR SOLUTION INTRAMUSCULAR; INTRAVENOUS at 15:08

## 2017-01-01 RX ADMIN — SODIUM CHLORIDE 75 ML/HR: 900 INJECTION, SOLUTION INTRAVENOUS at 12:49

## 2017-01-01 RX ADMIN — CEFTRIAXONE 1 G: 1 INJECTION, POWDER, FOR SOLUTION INTRAMUSCULAR; INTRAVENOUS at 18:57

## 2017-01-01 RX ADMIN — HEPARIN SODIUM 5000 UNITS: 5000 INJECTION, SOLUTION INTRAVENOUS; SUBCUTANEOUS at 15:09

## 2017-01-01 RX ADMIN — GUAIFENESIN 600 MG: 600 TABLET, EXTENDED RELEASE ORAL at 18:58

## 2017-01-01 RX ADMIN — Medication 10 ML: at 05:27

## 2017-01-01 RX ADMIN — IPRATROPIUM BROMIDE AND ALBUTEROL SULFATE 3 ML: .5; 3 SOLUTION RESPIRATORY (INHALATION) at 07:38

## 2017-01-01 RX ADMIN — HEPARIN SODIUM 5000 UNITS: 5000 INJECTION, SOLUTION INTRAVENOUS; SUBCUTANEOUS at 21:27

## 2017-01-01 RX ADMIN — Medication 10 ML: at 15:39

## 2017-01-01 RX ADMIN — Medication 10 ML: at 22:22

## 2017-01-01 RX ADMIN — ASPIRIN 81 MG CHEWABLE TABLET 81 MG: 81 TABLET CHEWABLE at 10:01

## 2017-01-01 RX ADMIN — ACETAMINOPHEN 650 MG: 325 TABLET ORAL at 03:20

## 2017-01-01 RX ADMIN — AMIODARONE HYDROCHLORIDE 0.5 MG/MIN: 50 INJECTION, SOLUTION INTRAVENOUS at 02:11

## 2017-01-01 RX ADMIN — FUROSEMIDE 40 MG: 40 TABLET ORAL at 18:27

## 2017-01-01 RX ADMIN — METHYLPREDNISOLONE SODIUM SUCCINATE 125 MG: 125 INJECTION, POWDER, FOR SOLUTION INTRAMUSCULAR; INTRAVENOUS at 02:36

## 2017-01-01 RX ADMIN — FUROSEMIDE 40 MG: 10 INJECTION, SOLUTION INTRAMUSCULAR; INTRAVENOUS at 17:43

## 2017-01-01 RX ADMIN — FEBUXOSTAT 80 MG: 40 TABLET ORAL at 09:32

## 2017-01-01 RX ADMIN — ISOSORBIDE DINITRATE: 10 TABLET ORAL at 12:11

## 2017-01-01 RX ADMIN — ISOSORBIDE DINITRATE: 10 TABLET ORAL at 09:53

## 2017-01-01 RX ADMIN — ASPIRIN 81 MG CHEWABLE TABLET 81 MG: 81 TABLET CHEWABLE at 09:53

## 2017-01-01 RX ADMIN — EPINEPHRINE 1 MG: 0.1 INJECTION, SOLUTION ENDOTRACHEAL; INTRACARDIAC; INTRAVENOUS at 01:15

## 2017-01-01 RX ADMIN — MORPHINE SULFATE 4 MG: 2 INJECTION, SOLUTION INTRAMUSCULAR; INTRAVENOUS at 10:16

## 2017-01-01 RX ADMIN — ALBUTEROL SULFATE 2 PUFF: 90 AEROSOL, METERED RESPIRATORY (INHALATION) at 03:19

## 2017-01-01 RX ADMIN — Medication 5 ML: at 14:00

## 2017-01-01 RX ADMIN — METOPROLOL SUCCINATE 12.5 MG: 25 TABLET, EXTENDED RELEASE ORAL at 09:58

## 2017-01-01 RX ADMIN — ACETAMINOPHEN 650 MG: 325 TABLET, FILM COATED ORAL at 03:09

## 2017-01-01 RX ADMIN — IPRATROPIUM BROMIDE AND ALBUTEROL SULFATE 3 ML: .5; 3 SOLUTION RESPIRATORY (INHALATION) at 01:05

## 2017-01-01 RX ADMIN — HEPARIN SODIUM 5000 UNITS: 5000 INJECTION, SOLUTION INTRAVENOUS; SUBCUTANEOUS at 05:41

## 2017-01-01 RX ADMIN — FUROSEMIDE 40 MG: 10 INJECTION, SOLUTION INTRAMUSCULAR; INTRAVENOUS at 09:52

## 2017-01-01 RX ADMIN — ISOSORBIDE DINITRATE: 10 TABLET ORAL at 10:58

## 2017-01-01 RX ADMIN — IPRATROPIUM BROMIDE AND ALBUTEROL SULFATE 3 ML: .5; 3 SOLUTION RESPIRATORY (INHALATION) at 07:32

## 2017-01-01 RX ADMIN — CEFTRIAXONE 1 G: 1 INJECTION, POWDER, FOR SOLUTION INTRAMUSCULAR; INTRAVENOUS at 17:43

## 2017-01-01 RX ADMIN — FUROSEMIDE 40 MG: 10 INJECTION, SOLUTION INTRAMUSCULAR; INTRAVENOUS at 12:11

## 2017-01-01 RX ADMIN — FEBUXOSTAT 80 MG: 40 TABLET ORAL at 14:44

## 2017-01-01 RX ADMIN — HEPARIN SODIUM 5000 UNITS: 5000 INJECTION, SOLUTION INTRAVENOUS; SUBCUTANEOUS at 15:28

## 2017-01-01 RX ADMIN — METOPROLOL SUCCINATE 12.5 MG: 25 TABLET, EXTENDED RELEASE ORAL at 09:33

## 2017-01-01 RX ADMIN — HEPARIN SODIUM 5000 UNITS: 5000 INJECTION, SOLUTION INTRAVENOUS; SUBCUTANEOUS at 05:46

## 2017-01-01 RX ADMIN — Medication 10 ML: at 19:00

## 2017-01-01 RX ADMIN — Medication 10 ML: at 15:09

## 2017-01-01 RX ADMIN — GUAIFENESIN 600 MG: 600 TABLET, EXTENDED RELEASE ORAL at 18:27

## 2017-01-01 RX ADMIN — GUAIFENESIN 600 MG: 600 TABLET, EXTENDED RELEASE ORAL at 21:32

## 2017-01-01 RX ADMIN — IPRATROPIUM BROMIDE AND ALBUTEROL SULFATE 3 ML: .5; 3 SOLUTION RESPIRATORY (INHALATION) at 08:11

## 2017-01-01 RX ADMIN — HEPARIN SODIUM 5000 UNITS: 5000 INJECTION, SOLUTION INTRAVENOUS; SUBCUTANEOUS at 05:44

## 2017-01-01 RX ADMIN — GUAIFENESIN 600 MG: 600 TABLET, EXTENDED RELEASE ORAL at 09:52

## 2017-01-01 RX ADMIN — LEVOFLOXACIN 750 MG: 750 TABLET, FILM COATED ORAL at 11:45

## 2017-01-01 RX ADMIN — ASPIRIN 81 MG CHEWABLE TABLET 81 MG: 81 TABLET CHEWABLE at 09:33

## 2017-01-01 RX ADMIN — METHYLPREDNISOLONE SODIUM SUCCINATE 40 MG: 40 INJECTION, POWDER, FOR SOLUTION INTRAMUSCULAR; INTRAVENOUS at 05:50

## 2017-01-01 RX ADMIN — HYDRALAZINE HYDROCHLORIDE 37.5 MG: 25 TABLET, FILM COATED ORAL at 21:22

## 2017-01-01 RX ADMIN — EPINEPHRINE 1 MG: 0.1 INJECTION, SOLUTION ENDOTRACHEAL; INTRACARDIAC; INTRAVENOUS at 02:01

## 2017-01-01 RX ADMIN — HEPARIN SODIUM 5000 UNITS: 5000 INJECTION, SOLUTION INTRAVENOUS; SUBCUTANEOUS at 17:46

## 2017-01-01 RX ADMIN — SODIUM CHLORIDE 75 ML/HR: 900 INJECTION, SOLUTION INTRAVENOUS at 19:48

## 2017-01-01 RX ADMIN — MULTIPLE VITAMINS W/ MINERALS TAB 1 TABLET: TAB at 10:20

## 2017-01-01 RX ADMIN — ASPIRIN 81 MG 81 MG: 81 TABLET ORAL at 09:29

## 2017-01-01 RX ADMIN — ISOSORBIDE DINITRATE 20 MG: 10 TABLET ORAL at 17:44

## 2017-01-01 RX ADMIN — HEPARIN SODIUM 5000 UNITS: 5000 INJECTION, SOLUTION INTRAVENOUS; SUBCUTANEOUS at 20:50

## 2017-01-01 RX ADMIN — FEBUXOSTAT 80 MG: 40 TABLET ORAL at 10:02

## 2017-01-01 RX ADMIN — Medication 10 ML: at 05:46

## 2017-01-01 RX ADMIN — CEFTRIAXONE 1 G: 1 INJECTION, POWDER, FOR SOLUTION INTRAMUSCULAR; INTRAVENOUS at 19:00

## 2017-01-01 RX ADMIN — Medication 10 ML: at 20:51

## 2017-01-01 RX ADMIN — IPRATROPIUM BROMIDE AND ALBUTEROL SULFATE 3 ML: .5; 3 SOLUTION RESPIRATORY (INHALATION) at 14:06

## 2017-01-01 RX ADMIN — METOPROLOL SUCCINATE 12.5 MG: 25 TABLET, EXTENDED RELEASE ORAL at 10:58

## 2017-01-01 RX ADMIN — EPINEPHRINE 1 MG: 0.1 INJECTION, SOLUTION ENDOTRACHEAL; INTRACARDIAC; INTRAVENOUS at 01:18

## 2017-01-01 RX ADMIN — IPRATROPIUM BROMIDE AND ALBUTEROL SULFATE 3 ML: .5; 3 SOLUTION RESPIRATORY (INHALATION) at 08:03

## 2017-01-01 RX ADMIN — FEBUXOSTAT 80 MG: 40 TABLET ORAL at 12:13

## 2017-01-01 RX ADMIN — IPRATROPIUM BROMIDE AND ALBUTEROL SULFATE 3 ML: .5; 3 SOLUTION RESPIRATORY (INHALATION) at 01:04

## 2017-01-01 RX ADMIN — ASPIRIN 81 MG CHEWABLE TABLET 81 MG: 81 TABLET CHEWABLE at 10:58

## 2017-01-01 RX ADMIN — METOPROLOL SUCCINATE 12.5 MG: 25 TABLET, EXTENDED RELEASE ORAL at 12:12

## 2017-01-01 RX ADMIN — IPRATROPIUM BROMIDE AND ALBUTEROL SULFATE 3 ML: .5; 3 SOLUTION RESPIRATORY (INHALATION) at 02:28

## 2017-01-01 RX ADMIN — ASPIRIN 81 MG CHEWABLE TABLET 81 MG: 81 TABLET CHEWABLE at 12:14

## 2017-01-01 RX ADMIN — HEPARIN SODIUM 5000 UNITS: 5000 INJECTION, SOLUTION INTRAVENOUS; SUBCUTANEOUS at 22:52

## 2017-01-01 RX ADMIN — Medication 10 ML: at 22:17

## 2017-01-17 NOTE — MR AVS SNAPSHOT
Visit Information Date & Time Provider Department Dept. Phone Encounter #  
 1/17/2017  8:15 AM Esther Gonzalez MD [facility] [phone] Follow-up Instructions Return in about 6 months (around 7/17/2017) for HTN, chol, gluc, CHF   Fasting lab one week prior . Upcoming Health Maintenance Date Due DTaP/Tdap/Td series (1 - Tdap) 5/13/2019* MEDICARE YEARLY EXAM 5/20/2017 GLAUCOMA SCREENING Q2Y 5/26/2017 *Topic was postponed. The date shown is not the original due date. Allergies as of 1/17/2017  Review Complete On: 1/17/2017 By: Esther Gonzalez MD  
 No Known Allergies Current Immunizations  Reviewed on 1/17/2017 Name Date Influenza High Dose Vaccine PF 9/16/2016, 10/28/2015, 11/20/2014 Influenza Vaccine 10/9/2013 Influenza Vaccine Split 1/15/2012  5:07 PM  
 Pneumococcal Conjugate (PCV-13) 5/19/2016 Pneumococcal Vaccine (Unspecified Type) 5/13/2009 TD Vaccine 5/13/2009 Reviewed by Romina Marshall LPN on 7/10/7300 at  8:12 AM  
You Were Diagnosed With   
  
 Codes Comments Benign hypertension with chronic kidney disease, stage III    -  Primary ICD-10-CM: I12.9, N18.3 ICD-9-CM: 403.10, 585.3 Hypercholesterolemia     ICD-10-CM: E78.00 ICD-9-CM: 272.0 Glucose intolerance (impaired glucose tolerance)     ICD-10-CM: R73.02 
ICD-9-CM: 790.22 Coronary artery disease involving native coronary artery of native heart, angina presence unspecified     ICD-10-CM: I25.10 ICD-9-CM: 414.01 Ischemic cardiomyopathy     ICD-10-CM: I25.5 ICD-9-CM: 414.8 Chronic systolic congestive heart failure (HCC)     ICD-10-CM: I50.22 ICD-9-CM: 428.22, 428.0 Cerebrovascular disease, arteriosclerotic, post-stroke     ICD-10-CM: I67.2, Z86.73 
ICD-9-CM: 437.0, V12.54 Vitals BP Pulse Temp Resp Height(growth percentile) Weight(growth percentile) 132/66 (BP 1 Location: Left arm, BP Patient Position: Sitting) 73 97 °F (36.1 °C) (Oral) 14 5' 5\" (1.651 m) 160 lb (72.6 kg) SpO2 BMI OB Status Smoking Status 95% 26.63 kg/m2 Postmenopausal Former Smoker Vitals History BMI and BSA Data Body Mass Index Body Surface Area  
 26.63 kg/m 2 1.82 m 2 Preferred Pharmacy Pharmacy Name [address] [facility] [phone] Your Updated Medication List  
  
   
This list is accurate as of: 1/17/17  8:56 AM.  Always use your most recent med list.  
  
  
  
  
 acetaminophen 325 mg tablet Commonly known as:  TYLENOL Take  by mouth every four (4) hours as needed for Pain. aspirin 81 mg tablet Take 81 mg by mouth. atorvastatin 20 mg tablet Commonly known as:  LIPITOR Take 1 Tab by mouth daily. BENADRYL 25 mg capsule Generic drug:  diphenhydrAMINE Take 25 mg by mouth every six (6) hours as needed. BIDIL 20-37.5 mg per tablet Generic drug:  isosorbide-hydrALAZINE  
TAKE ONE TABLET BY MOUTH THREE TIMES DAILY ferrous sulfate 325 mg (65 mg iron) Cper Take 1 Tab by mouth daily. furosemide 20 mg tablet Commonly known as:  LASIX Take 20 mg by mouth daily. metoprolol succinate 25 mg XL tablet Commonly known as:  TOPROL-XL Take 12.5 mg by mouth daily. multivit-min-FA-lycopen-lutein 0.4-300-250 mg-mcg-mcg Tab Take  by mouth. nitroglycerin 0.4 mg/hr Commonly known as:  NITRODUR  
1 Patch by TransDERmal route daily. predniSONE 5 mg tablet Commonly known as:  DELTASONE  
TAKE ONE-HALF (1/2) TABLET (2.5 MG) DAILY ULORIC 80 mg Tab tablet Generic drug:  febuxostat TAKE 1 TABLET DAILY We Performed the Following CBC WITH AUTOMATED DIFF [33977 CPT(R)] MAGNESIUM [id number] CPT(R)] METABOLIC PANEL, BASIC [44101 CPT(R)] Follow-up Instructions Return in about 6 months (around 7/17/2017) for HTN, chol, gluc, CHF   Fasting lab one week prior . To-Do List   
 07/17/2017 Lab:  HEMOGLOBIN A1C WITH EAG   
  
 07/17/2017 Lab:  LIPID PANEL   
  
 07/17/2017 Lab:  METABOLIC PANEL, COMPREHENSIVE Patient Instructions Office visit in 6 months with fasting lab work a week before visit. Learning About Heart Failure Zones What are heart failure zones? Heart failure zones give you an easy way to see changes in your heart failure symptoms. They also tell you when you need to get help. Check every day to see which zone you are in. Green zone. You are doing well. This is where you want to be. · Your weight is stable. This means it is not going up or down. · You breathe easily. · You are sleeping well. You are able to lie flat without shortness of breath. · You can do your usual activities. Yellow zone. Be careful. Your symptoms are changing. Call your doctor. · You have new or increased shortness of breath. · You are dizzy or lightheaded, or you feel like you may faint. · You have sudden weight gain, such as 3 pounds or more in 2 to 3 days. · You have increased swelling in your legs, ankles, or feet. · You are so tired or weak that you cannot do your usual activities. · You are not sleeping well. Shortness of breath wakes you up at night. You need extra pillows. Your doctor's name: ____________________________________________________________ Your doctor's contact information: _________________________________________________ Red zone. This is an emergency. Call 911. You have symptoms of sudden heart failure, such as: 
· You have severe trouble breathing. · You cough up pink, foamy mucus. · You have a new irregular or fast heartbeat. You have symptoms of a heart attack. These may include: · Chest pain or pressure, or a strange feeling in the chest. 
· Sweating. · Shortness of breath. · Nausea or vomiting. · Pain, pressure, or a strange feeling in the back, neck, jaw, or upper belly or in one or both shoulders or arms. · Lightheadedness or sudden weakness. · A fast or irregular heartbeat. If you have symptoms of a heart attack: After you call 911, the  may tell you to chew 1 adult-strength or 2 to 4 low-dose aspirin. Wait for an ambulance. Do not try to drive yourself. Follow-up care is a key part of your treatment and safety. Be sure to make and go to all appointments, and call your doctor if you are having problems. It's also a good idea to know your test results and keep a list of the medicines you take. Where can you learn more? Go to http://french-conner.info/. Enter T174 in the search box to learn more about \"Learning About Heart Failure Zones. \" Current as of: January 27, 2016 Content Version: 11.1 © 7654-8600 [employer]. Care instructions adapted under license by [employer] (which disclaims liability or warranty for this information). If you have questions about a medical condition or this instruction, always ask your healthcare professional. [place] [address] any warranty or liability for your use of this information. Insomnia: Care Instructions Your Care Instructions Insomnia is the inability to sleep well. It is a common problem for most people at some time. Insomnia may make it hard for you to get to sleep, stay asleep, or sleep as long as you need to. This can make you tired and grouchy during the day. It can also make you forgetful, less effective at work, and unhappy. Insomnia can be caused by conditions such as depression or anxiety. Pain can also affect your ability to sleep. When these problems are solved, the insomnia usually clears up. But sometimes bad sleep habits can cause insomnia. If insomnia is affecting your work or your enjoyment of life, you can take steps to improve your sleep. Follow-up care is a key part of your treatment and safety. Be sure to make and go to all appointments, and call your doctor if you are having problems. It's also a good idea to know your test results and keep a list of the medicines you take. How can you care for yourself at home? What to avoid · Do not have drinks with caffeine, such as coffee or black tea, for 8 hours before bed. · Do not smoke or use other types of tobacco near bedtime. Nicotine is a stimulant and can keep you awake. · Avoid drinking alcohol late in the evening, because it can cause you to wake in the middle of the night. · Do not eat a big meal close to bedtime. If you are hungry, eat a light snack. · Do not drink a lot of water close to bedtime, because the need to urinate may wake you up during the night. · Do not read or watch TV in bed. Use the bed only for sleeping and sexual activity. What to try · Go to bed at the same time every night, and wake up at the same time every morning. Do not take naps during the day. · Keep your bedroom quiet, dark, and cool. · Sleep on a comfortable pillow and mattress. · If watching the clock makes you anxious, turn it facing away from you so you cannot see the time. · If you worry when you lie down, start a worry book. Well before bedtime, write down your worries, and then set the book and your concerns aside. · Try meditation or other relaxation techniques before you go to bed. · If you cannot fall asleep, get up and go to another room until you feel sleepy. Do something relaxing. Repeat your bedtime routine before you go to bed again. · Make your house quiet and calm about an hour before bedtime. Turn down the lights, turn off the TV, log off the computer, and turn down the volume on music. This can help you relax after a busy day. When should you call for help? Watch closely for changes in your health, and be sure to contact your doctor if: · Your efforts to improve your sleep do not work. · Your insomnia gets worse. · You have been feeling down, depressed, or hopeless or have lost interest in things that you usually enjoy. Where can you learn more? Go to http://french-conner.info/. Enter P513 in the search box to learn more about \"Insomnia: Care Instructions. \" Current as of: July 26, 2016 Content Version: 11.1 © 3111-3369 [employer]. Care instructions adapted under license by [employer] (which disclaims liability or warranty for this information). If you have questions about a medical condition or this instruction, always ask your healthcare professional. [address] any warranty or liability for your use of this information. Learning About Sleeping Well What does sleeping well mean? Sleeping well means getting enough sleep. How much sleep is enough varies among people. The number of hours you sleep is not as important as how you feel when you wake up. If you do not feel refreshed, you probably need more sleep. Another sign of not getting enough sleep is feeling tired during the day. The average total nightly sleep time is 7½ to 8 hours. Healthy adults may need a little more or a little less than this. Why is getting enough sleep important? Getting enough quality sleep is a basic part of good health. When your sleep suffers, your mood and your thoughts can suffer too. You may find yourself feeling more grumpy or stressed. Not getting enough sleep also can lead to serious problems, including injury, accidents, anxiety, and depression. What might cause poor sleeping? Many things can cause sleep problems, including: · Stress. Stress can be caused by fear about a single event, such as giving a speech. Or you may have ongoing stress, such as worry about work or school. · Depression, anxiety, and other mental or emotional conditions. · Changes in your sleep habits or surroundings. This includes changes that happen where you sleep, such as noise, light, or sleeping in a different bed. It also includes changes in your sleep pattern, such as having jet lag or working a late shift. · Health problems, such as pain, breathing problems, and restless legs syndrome. · Lack of regular exercise. How can you help yourself? Here are some tips that may help you sleep more soundly and wake up feeling more refreshed. Your sleeping area · Use your bedroom only for sleeping and sex. A bit of light reading may help you fall asleep. But if it doesn't, do your reading elsewhere in the house. Don't watch TV in bed. · Be sure your bed is big enough to stretch out comfortably, especially if you have a sleep partner. · Keep your bedroom quiet, dark, and cool. Use curtains, blinds, or a sleep mask to block out light. To block out noise, use earplugs, soothing music, or a \"white noise\" machine. Your evening and bedtime routine · Create a relaxing bedtime routine. You might want to take a warm shower or bath, listen to soothing music, or drink a cup of noncaffeinated tea. · Go to bed at the same time every night. And get up at the same time every morning, even if you feel tired. What to avoid · Limit caffeine (coffee, tea, caffeinated sodas) during the day, and don't have any for at least 4 to 6 hours before bedtime. · Don't drink alcohol before bedtime. Alcohol can cause you to wake up more often during the night. · Don't smoke or use tobacco, especially in the evening. Nicotine can keep you awake. · Don't take naps during the day, especially close to bedtime. · Don't lie in bed awake for too long. If you can't fall asleep, or if you wake up in the middle of the night and can't get back to sleep within 15 minutes or so, get out of bed and go to another room until you feel sleepy.  
· Don't take medicine right before bed that may keep you awake or make you feel hyper or energized. Your doctor can tell you if your medicine may do this and if you can take it earlier in the day. If you can't sleep · Imagine yourself in a peaceful, pleasant scene. Focus on the details and feelings of being in a place that is relaxing. · Get up and do a quiet or boring activity until you feel sleepy. · Don't drink any liquids after 6 p.m. if you wake up often because you have to go to the bathroom. Where can you learn more? Go to http://french-conner.info/. Enter U404 in the search box to learn more about \"Learning About Sleeping Well. \" Current as of: July 26, 2016 Content Version: 11.1 © 0817-9699 [employer], [employer]. Care instructions adapted under license by [employer] (which disclaims liability or warranty for this information). If you have questions about a medical condition or this instruction, always ask your healthcare professional. [place] [address] any warranty or liability for your use of this information. Introducing [facility]! Dear Christiano Dey: Thank you for requesting a [employer] account. Our records indicate that you have previously registered for a [employer] account but its currently inactive. Please call our [employer] support line at [phone]. Additional Information If you have questions, please visit the Frequently Asked Questions section of the [employer] website at [email or web address] Remember, [employer] is NOT to be used for urgent needs. For medical emergencies, dial 911. Now available from your iPhone and Android! Please provide this summary of care documentation to your next provider. Your primary care clinician is listed as Leanne Dash. If you have any questions after today's visit, please call [phone].

## 2017-01-17 NOTE — PROGRESS NOTES
Reviewed record In preparation for visit and have obtained necessary documentation. Has info on advanced directive but has not filled them out. 1. Have you been to the ER, urgent care clinic or hospitalized since your last visit[facility] [id number] [facility]     2. Have you seen or consulted any other health care providers outside of the [address] [place] [address] since your last visit? Include any pap smears or colon screening.  No    Health Maintenance reviewed:

## 2017-01-17 NOTE — PROGRESS NOTES
HISTORY OF PRESENT ILLNESS  Shaheen Kenyon is a 80 y.o. female. HPI  CARDIOVASCULAR  She presents for follow up of hypertension, hyperlipidemia, coronary artery disease, history of prior MI, Systolic HF, cerebrovascular disease post stroke and chronic kidney disease. Diet and Lifestyle: generally follows a low fat low cholesterol diet, generally follows a low sodium diet, sedentary, nonsmoker   Medication compliance: compliant all of the time  Medication side effects: none  Home BP Monitoring: Is measured but she did not bring records. Cardiovascular ROS:  She complains of lower extremity edema. She denies palpitations, orthopnea, paroxysmal nocturnal dyspnea, exertional chest pressure/discomfort, claudication, dyspnea on exertion, dizziness    Patient Active Problem List   Diagnosis Code    Spondylosis M47.9    Benign hypertension with chronic kidney disease, stage III I12.9, N18.3    Vitamin D deficiency E55.9    Prediabetes R73.03    Hypercholesterolemia E78.00    Chronic systolic congestive heart failure (HCC) I50.22    Ischemic cardiomyopathy I25.5    CKD (chronic kidney disease) stage 3, GFR 30-59 ml/min N18.3    Aphasia R47.01    Right sided weakness M62.81    Cerebrovascular disease, arteriosclerotic, post-stroke I67.2, Z86.73    Gout, chronic, with tophus M1A. 9XX1    Osteopenia M85.80    CAD (coronary artery disease), native coronary artery I25.10    Advance directive discussed with patient Z70.80     Past Medical History   Diagnosis Date    CAD (coronary artery disease)     Chronic kidney disease     Glucose intolerance (impaired glucose tolerance)     Gout     Hypercholesterolemia     Hypertension     MI (myocardial infarction) ([facility] [address] 1980's     s/p angioplasty    Spondylosis     Vitamin D deficiency      Past Surgical History   Procedure Laterality Date    Pr cardiac surg procedure unlist       cardiac stent    Hx angioplasty  1980s    Hx coronary stent placement Social History     Social History    Marital status:      Spouse name: N/A    Number of children: N/A    Years of education: N/A     Social History Main Topics    Smoking status: Former Smoker     Years: 0.00    Smokeless tobacco: Never Used    Alcohol use No    Drug use: No    Sexual activity: Not Asked     Other Topics Concern    None     Social History Narrative     Family History   Problem Relation Age of Onset    Hypertension Mother     Stroke Sister      No Known Allergies  Current Outpatient Prescriptions   Medication Sig Dispense Refill    ULORIC 80 mg tab tablet TAKE 1 TABLET DAILY 90 Tab 1    diphenhydrAMINE (BENADRYL) 25 mg capsule Take 25 mg by mouth every six (6) hours as needed.  predniSONE (DELTASONE) 5 mg tablet TAKE ONE-HALF (1/2) TABLET (2.5 MG) DAILY 45 Tab 3    acetaminophen (TYLENOL) 325 mg tablet Take  by mouth every four (4) hours as needed for Pain.  multivit-min-FA-lycopen-lutein 0.4-300-250 mg-mcg-mcg tab Take  by mouth.  ferrous sulfate 325 mg (65 mg iron) cpER Take 1 Tab by mouth daily.  nitroglycerin (NITRODUR) 0.4 mg/hr 1 Patch by TransDERmal route daily.  metoprolol succinate (TOPROL-XL) 25 mg XL tablet Take 12.5 mg by mouth daily.  furosemide (LASIX) 20 mg tablet Take 20 mg by mouth daily.  atorvastatin (LIPITOR) 20 mg tablet Take 1 Tab by mouth daily. 90 Tab 3    BIDIL 20-37.5 mg per tablet TAKE ONE TABLET BY MOUTH THREE TIMES DAILY 90 Tab 12    aspirin 81 mg tablet Take 81 mg by mouth. Review of Systems   Constitutional: Negative for malaise/fatigue and weight loss. Gastrointestinal: Positive for constipation. Negative for diarrhea. Musculoskeletal: Positive for back pain. Negative for joint pain. Neurological: Negative for dizziness, tingling and focal weakness.      Visit Vitals    /66 (BP 1 Location: Left arm, BP Patient Position: Sitting)    Pulse 73    Temp 97 °F (36.1 °C) (Oral)    Resp 14    Ht 5' 5\" (1.651 m)    Wt 160 lb (72.6 kg)    SpO2 95%    BMI 26.63 kg/m2     Physical Exam   Constitutional: She is oriented to person, place, and time. She appears well-developed and well-nourished. HENT:   Head: Normocephalic and atraumatic. Eyes: Conjunctivae are normal. Pupils are equal, round, and reactive to light. Neck: Neck supple. Carotid bruit is not present. No thyromegaly present. Cardiovascular: Normal rate, regular rhythm and normal heart sounds. PMI is not displaced. Exam reveals no gallop. No murmur heard. Pulses:       Dorsalis pedis pulses are 2+ on the right side, and 2+ on the left side. Posterior tibial pulses are 2+ on the right side, and 2+ on the left side. Pulmonary/Chest: Effort normal. She has no wheezes. She has no rhonchi. She has no rales. Abdominal: Soft. Normal appearance. She exhibits no abdominal bruit and no mass. There is no hepatosplenomegaly. There is no tenderness. Musculoskeletal: She exhibits edema (trace on right, 1+ on left at ankles). Lymphadenopathy:     She has no cervical adenopathy. Right: No supraclavicular adenopathy present. Left: No inguinal and no supraclavicular adenopathy present. Neurological: She is alert and oriented to person, place, and time. No sensory deficit. Skin: Skin is warm, dry and intact. No rash noted. Psychiatric: She has a normal mood and affect. Her behavior is normal.   Nursing note and vitals reviewed.     Lab Results   Component Value Date/Time    Cholesterol, total 158 05/12/2016 10:58 AM    HDL Cholesterol 49 05/12/2016 10:58 AM    LDL, calculated 84 05/12/2016 10:58 AM    VLDL, calculated 25 05/12/2016 10:58 AM    Triglyceride 125 05/12/2016 10:58 AM    CHOL/HDL Ratio 3.6 04/03/2013 01:40 PM     Lab Results   Component Value Date/Time    Hemoglobin A1c 6.3 09/16/2016 08:58 AM    Hemoglobin A1c 5.8 04/25/2012 09:18 AM     Lab Results   Component Value Date/Time    Sodium 140 01/17/2017 09:02 AM    Potassium 4.8 01/17/2017 09:02 AM    Chloride 101 01/17/2017 09:02 AM    CO2 24 01/17/2017 09:02 AM    Anion gap 7 11/10/2016 11:02 PM    Glucose 95 01/17/2017 09:02 AM    BUN 50 01/17/2017 09:02 AM    Creatinine 2.14 01/17/2017 09:02 AM    BUN/Creatinine ratio 23 01/17/2017 09:02 AM    GFR est AA 23 01/17/2017 09:02 AM    GFR est non-AA 20 01/17/2017 09:02 AM    Calcium 9.8 01/17/2017 09:02 AM    Bilirubin, total 0.3 06/08/2016 06:09 PM    ALT 18 06/08/2016 06:09 PM    AST 26 06/08/2016 06:09 PM    Alk. phosphatase 71 06/08/2016 06:09 PM    Protein, total 7.7 06/08/2016 06:09 PM    Albumin 3.5 06/08/2016 06:09 PM    Globulin 4.2 06/08/2016 06:09 PM    A-G Ratio 0.8 06/08/2016 06:09 PM       ASSESSMENT and PLAN    ICD-10-CM ICD-9-CM    1. Benign hypertension with chronic kidney disease, stage III L34.5 860.64 METABOLIC PANEL, BASIC    A18.7 585.3 MAGNESIUM      CBC WITH AUTOMATED DIFF   2. Hypercholesterolemia E78.00 272.0 LIPID PANEL      METABOLIC PANEL, COMPREHENSIVE   3. Glucose intolerance (impaired glucose tolerance) R73.02 790.22 HEMOGLOBIN A1C WITH EAG   4. Coronary artery disease involving native coronary artery of native heart, angina presence unspecified I25.10 414.01    5. Ischemic cardiomyopathy I25.5 414.8    6. Chronic systolic congestive heart failure (HCC) I50.22 428.22      428.0    7. Cerebrovascular disease, arteriosclerotic, post-stroke I67.2 437.0     Z86.73 V12.54          Benign hypertension with chronic kidney disease, stage III  Hypertension is controlled  -     METABOLIC PANEL, BASIC  -     MAGNESIUM  -     CBC WITH AUTOMATED DIFF    Hypercholesterolemia  Hyperlipidemia is controlled  -     LIPID PANEL; Future  -     METABOLIC PANEL, COMPREHENSIVE; Future    Glucose intolerance (impaired glucose tolerance)  -     HEMOGLOBIN A1C WITH EAG;  Future    Coronary artery disease involving native coronary artery of native heart, angina presence unspecified  Stable    Ischemic cardiomyopathy  Stable    Chronic systolic congestive heart failure (HCC)  Stable. Cerebrovascular disease, arteriosclerotic, post-stroke  Stable          Follow-up Disposition:  Return in about 6 months (around 7/17/2017) for HTN, chol, gluc, CHF   Fasting lab one week prior . lab results and schedule of future lab studies reviewed with patient  reviewed diet, exercise and weight control  cardiovascular risk and specific lipid/LDL goals reviewed  I have discussed the diagnosis with the patient and the intended plan as seen in the above orders. Patient is in agreement. The patient has received an after-visit summary and questions were answered concerning future plans. I have discussed medication side effects and warnings with the patient as well.

## 2017-01-17 NOTE — PATIENT INSTRUCTIONS
Office visit in 6 months with fasting lab work a week before visit. Learning About Heart Failure Zones  What are heart failure zones? Heart failure zones give you an easy way to see changes in your heart failure symptoms. They also tell you when you need to get help. Check every day to see which zone you are in. Green zone. You are doing well. This is where you want to be. · Your weight is stable. This means it is not going up or down. · You breathe easily. · You are sleeping well. You are able to lie flat without shortness of breath. · You can do your usual activities. Yellow zone. Be careful. Your symptoms are changing. Call your doctor. · You have new or increased shortness of breath. · You are dizzy or lightheaded, or you feel like you may faint. · You have sudden weight gain, such as 3 pounds or more in 2 to 3 days. · You have increased swelling in your legs, ankles, or feet. · You are so tired or weak that you cannot do your usual activities. · You are not sleeping well. Shortness of breath wakes you up at night. You need extra pillows. Your doctor's name: ____________________________________________________________  Your doctor's contact information: _________________________________________________  Red zone. This is an emergency. Call 911. You have symptoms of sudden heart failure, such as:  · You have severe trouble breathing. · You cough up pink, foamy mucus. · You have a new irregular or fast heartbeat. You have symptoms of a heart attack. These may include:  · Chest pain or pressure, or a strange feeling in the chest.  · Sweating. · Shortness of breath. · Nausea or vomiting. · Pain, pressure, or a strange feeling in the back, neck, jaw, or upper belly or in one or both shoulders or arms. · Lightheadedness or sudden weakness. · A fast or irregular heartbeat.   If you have symptoms of a heart attack: After you call 911, the  may tell you to chew 1 adult-strength or 2 to 4 low-dose aspirin. Wait for an ambulance. Do not try to drive yourself. Follow-up care is a key part of your treatment and safety. Be sure to make and go to all appointments, and call your doctor if you are having problems. It's also a good idea to know your test results and keep a list of the medicines you take. Where can you learn more? Go to http://french-conner.info/. Enter T174 in the search box to learn more about \"Learning About Heart Failure Zones. \"  Current as of: January 27, 2016  Content Version: 11.1  © 6439-1947 [employer]. Care instructions adapted under license by [employer] (which disclaims liability or warranty for this information). If you have questions about a medical condition or this instruction, always ask your healthcare professional. [address] any warranty or liability for your use of this information. Insomnia: Care Instructions  Your Care Instructions  Insomnia is the inability to sleep well. It is a common problem for most people at some time. Insomnia may make it hard for you to get to sleep, stay asleep, or sleep as long as you need to. This can make you tired and grouchy during the day. It can also make you forgetful, less effective at work, and unhappy. Insomnia can be caused by conditions such as depression or anxiety. Pain can also affect your ability to sleep. When these problems are solved, the insomnia usually clears up. But sometimes bad sleep habits can cause insomnia. If insomnia is affecting your work or your enjoyment of life, you can take steps to improve your sleep. Follow-up care is a key part of your treatment and safety. Be sure to make and go to all appointments, and call your doctor if you are having problems. It's also a good idea to know your test results and keep a list of the medicines you take. How can you care for yourself at home?   What to avoid  · Do not have drinks with caffeine, such as coffee or black tea, for 8 hours before bed. · Do not smoke or use other types of tobacco near bedtime. Nicotine is a stimulant and can keep you awake. · Avoid drinking alcohol late in the evening, because it can cause you to wake in the middle of the night. · Do not eat a big meal close to bedtime. If you are hungry, eat a light snack. · Do not drink a lot of water close to bedtime, because the need to urinate may wake you up during the night. · Do not read or watch TV in bed. Use the bed only for sleeping and sexual activity. What to try  · Go to bed at the same time every night, and wake up at the same time every morning. Do not take naps during the day. · Keep your bedroom quiet, dark, and cool. · Sleep on a comfortable pillow and mattress. · If watching the clock makes you anxious, turn it facing away from you so you cannot see the time. · If you worry when you lie down, start a worry book. Well before bedtime, write down your worries, and then set the book and your concerns aside. · Try meditation or other relaxation techniques before you go to bed. · If you cannot fall asleep, get up and go to another room until you feel sleepy. Do something relaxing. Repeat your bedtime routine before you go to bed again. · Make your house quiet and calm about an hour before bedtime. Turn down the lights, turn off the TV, log off the computer, and turn down the volume on music. This can help you relax after a busy day. When should you call for help? Watch closely for changes in your health, and be sure to contact your doctor if:  · Your efforts to improve your sleep do not work. · Your insomnia gets worse. · You have been feeling down, depressed, or hopeless or have lost interest in things that you usually enjoy. Where can you learn more? Go to http://french-conner.info/. Enter P513 in the search box to learn more about \"Insomnia: Care Instructions. \"  Current as of: July 26, 2016  Content Version: 11.1  © 2006-2016 [employer]. Care instructions adapted under license by [employer] (which disclaims liability or warranty for this information). If you have questions about a medical condition or this instruction, always ask your healthcare professional. [facility] [address] any warranty or liability for your use of this information. Learning About Sleeping Well  What does sleeping well mean? Sleeping well means getting enough sleep. How much sleep is enough varies among people. The number of hours you sleep is not as important as how you feel when you wake up. If you do not feel refreshed, you probably need more sleep. Another sign of not getting enough sleep is feeling tired during the day. The average total nightly sleep time is 7½ to 8 hours. Healthy adults may need a little more or a little less than this. Why is getting enough sleep important? Getting enough quality sleep is a basic part of good health. When your sleep suffers, your mood and your thoughts can suffer too. You may find yourself feeling more grumpy or stressed. Not getting enough sleep also can lead to serious problems, including injury, accidents, anxiety, and depression. What might cause poor sleeping? Many things can cause sleep problems, including:  · Stress. Stress can be caused by fear about a single event, such as giving a speech. Or you may have ongoing stress, such as worry about work or school. · Depression, anxiety, and other mental or emotional conditions. · Changes in your sleep habits or surroundings. This includes changes that happen where you sleep, such as noise, light, or sleeping in a different bed. It also includes changes in your sleep pattern, such as having jet lag or working a late shift. · Health problems, such as pain, breathing problems, and restless legs syndrome. · Lack of regular exercise. How can you help yourself?   Here are some tips that may help you sleep more soundly and wake up feeling more refreshed. Your sleeping area  · Use your bedroom only for sleeping and sex. A bit of light reading may help you fall asleep. But if it doesn't, do your reading elsewhere in the house. Don't watch TV in bed. · Be sure your bed is big enough to stretch out comfortably, especially if you have a sleep partner. · Keep your bedroom quiet, dark, and cool. Use curtains, blinds, or a sleep mask to block out light. To block out noise, use earplugs, soothing music, or a \"white noise\" machine. Your evening and bedtime routine  · Create a relaxing bedtime routine. You might want to take a warm shower or bath, listen to soothing music, or drink a cup of noncaffeinated tea. · Go to bed at the same time every night. And get up at the same time every morning, even if you feel tired. What to avoid  · Limit caffeine (coffee, tea, caffeinated sodas) during the day, and don't have any for at least 4 to 6 hours before bedtime. · Don't drink alcohol before bedtime. Alcohol can cause you to wake up more often during the night. · Don't smoke or use tobacco, especially in the evening. Nicotine can keep you awake. · Don't take naps during the day, especially close to bedtime. · Don't lie in bed awake for too long. If you can't fall asleep, or if you wake up in the middle of the night and can't get back to sleep within 15 minutes or so, get out of bed and go to another room until you feel sleepy. · Don't take medicine right before bed that may keep you awake or make you feel hyper or energized. Your doctor can tell you if your medicine may do this and if you can take it earlier in the day. If you can't sleep  · Imagine yourself in a peaceful, pleasant scene. Focus on the details and feelings of being in a place that is relaxing. · Get up and do a quiet or boring activity until you feel sleepy.   · Don't drink any liquids after 6 p.m. if you wake up often because you have to go to the bathroom. Where can you learn more? Go to http://french-conner.info/. Enter F995 in the search box to learn more about \"Learning About Sleeping Well. \"  Current as of: July 26, 2016  Content Version: 11.1  © 7567-6903 [employer]. Care instructions adapted under license by [employer] (which disclaims liability or warranty for this information). If you have questions about a medical condition or this instruction, always ask your healthcare professional. [address] any warranty or liability for your use of this information.

## 2017-03-20 NOTE — PROGRESS NOTES
RHEUMATOLOGY PROBLEM LIST AND CHIEF COMPLAINT  1. Chronic tophaceous gouty arthritis (2014), last uric acid 2.8 (10/15/2015), Uloric 80mg  2. Osteoarthritis    INTERVAL HISTORY  This is a 80 y.o.  female. Today, the patient complains of pain in the joints. Location: knee  Severity:  5 on a scale of 0-10  Timing: all day   Duration:  1 month  Modifying factors: Uloric  Context/Associated signs and symptoms: The patient has not been seen in a year and 4 months. She received a steroid injection in her left knee in August 2016, by Dr. Delonte Salomon and admits to about 7 months of relief from this. Today, she complains of pain and swelling in her left knee from her OA. She continues on Uloric 80 mg daily and denies any recent gout flares. She admits to taking prednisone 2.5 mg daily. She denies having her bone density checked recently and denies ever taking fosamax. RHEUMATOLOGY REVIEW OF SYSTEMS   Positives as per history  Negatives as follows:  Augusta Giron:  Denies unexplained persistent fevers or weight change  RESPIRATORY:  No pleuritic pain, exertional dyspnea  CARDIOVASCULAR:  Denies chest pain  GASTRO:   Denies heartburn, abdominal pain, nausea, vomiting, diarrhea  SKIN:    Denies rash   MSK:    No morning stiffness >1 hour     PAST MEDICAL HISTORY  Reviewed with patient, significant changes in medical history - no     PHYSICAL EXAM  Blood pressure 168/81, pulse 91, temperature 97.5 °F (36.4 °C), temperature source Oral, resp. rate 16, height 5' 5\" (1.651 m), weight 157 lb (71.2 kg), SpO2 94 %. GENERAL APPEARANCE: Well-nourished, no acute distress  NECK: No adenopathy  ENT: No oral ulcers  CARDIOVASCULAR: Heart rhythm is regular. No murmur, rub, gallop  CHEST: Normal vesicular breath sounds. No wheezes, rales, pleural friction rubs  ABDOMINAL: The abdomen is soft and nontender.  Bowel sounds are normal  SKIN: No rash, palpable purpura, digital ulcer, abnormal thickening   MUSCULOSKELETAL:   Upper extremities - Chronic changes of left 4th digit with tophi, decreased ROM - improved. Chronic changes of left 5th digit with tophi, subtle swelling of PIP joints. There has been continued improvement in tophi size. Lower extremities - left knee bony prominence, normal range of motion. Hammertoe deformities     LABS, RADIOLOGY AND PROCEDURES   Previous labs reviewed -yes    ASSESSMENT  1. Chronic tophaceous gouty arthritis (Established problem -  Very good partial response) - Her uric acid has not been checked recently, but she has continued on Uloric 80 mg daily so she is unlikely to flare. She will continue on uloric 80 mg daily. She has been taking prednisone 2.5 mg daily, which she no longer needs, so I want her to start taking prednisone 2 mg daily then taper by 1 mg q2 weeks until she is no longer taking it. She should return in 6 months for a follow up. 2. Osteoarthritis (Established problem -  Partial response) - nonsteroidals are not recommended. Tylenol for joint pain. I will inject her left knee with Depo-medrol 40 mg today. 3. Bone health - We will check a bone density to screen for osteoporosis. We discussed starting fosamax pending the results of her dexa scan. PLAN  1. Continue uloric to 80 mg daily  2. Prednisone 2 mg daily, taper by 1 mg q2 weeks  3. Order Dexa scan  4. Left knee steroid injection  5. Return in 6 months    Candido Stevens MD  Adult and Pediatric Rheumatology     [address] [place] [address], Phone [phone], Fax [phone]     Visiting  of Pediatrics    Department of Pediatrics, [facility] of [address] [place] [address], [address] [place] [address], Phone [phone], Fax [phone]    Patient Instructions   STOP - Prednisone 2.5mg (1/2 pill)    Prednisone 2mg (2 pills) x 2 weeks  Prednisone 1mg (1 pill) x 2 weeks    Continue Uloric 80mg daily        cc:  Iris Alvarez Janet Zamora MD    Written by gale Henning, as dictated by Darrick Tamez. Callie Baker M.D. [facility]  OFFICE PROCEDURE PROGRESS NOTE        Chart reviewed for the following:   Enoc QUINONES, have reviewed the History, Physical and updated the Allergic reactions for [address] performed immediately prior to start of procedure:   Enoc QUINONES, have performed the following reviews on Herrera Eng prior to the start of the procedure:            * Patient was identified by name and date of birth   * Agreement on procedure being performed was verified  * Risks and Benefits explained to the patient  * Procedure site verified and marked as necessary  * Patient was positioned for comfort  * Consent was signed and verified     Time: 8:40 am      Date of procedure: 3/20/2017    Procedure performed by: Enoc Frias MD    Provider assisted by: none    Patient assisted by: self    How tolerated by patient: tolerated the procedure well with no complications    Post Procedural Pain Scale: 0 - No Hurt    Comments: none      PROCEDURE  After consent was obtained, using sterile technique the left knee was prepped and Depo-medrol 40 mg and 1ml of lidocaine was then injected and the needle withdrawn. The procedure was well tolerated. The patient is asked to continue to rest for 24 hours before resuming regular activities. It may be more painful for the first 1-2 days. Watch for fever, or increased swelling or persistent pain. Call or return to clinic prn if such symptoms occur.

## 2017-03-20 NOTE — MR AVS SNAPSHOT
Visit Information Date & Time Provider Department Dept. Phone Encounter #  
 3/20/2017  9:00 AM Rashawn Gomez MD [facility] [id number] Follow-up Instructions Return in about 6 months (around 9/20/2017). Your Appointments 3/20/2017  9:00 AM  
ESTABLISHED PATIENT with Rashawn Gomez MD  
Arthritis and [address] ([facility]) Appt Note: wants knee injection; knee injection [address] [facility] [address]  
[phone]  
  
   
 [address] [id number]  
  
    
 7/10/2017  8:15 AM  
LAB with LAB [facility]) Appt Note: fasting lab/$0CP KMP 01/17/17  
 [address] [facility] [address] [phone]  
  
   
 [address] [facility] [address] 7/17/2017  8:15 AM  
ROUTINE CARE with MD [facility]) Appt Note: htn, chol, gluc, chf, fasting lab prior/$0CP KMP 1/17/17  
 [address] [place] [address] [phone]  
  
   
 [address] [facility] [address] Upcoming Health Maintenance Date Due DTaP/Tdap/Td series (1 - Tdap) 5/13/2019* MEDICARE YEARLY EXAM 5/20/2017 GLAUCOMA SCREENING Q2Y 5/26/2017 *Topic was postponed. The date shown is not the original due date. Allergies as of 3/20/2017  Review Complete On: 3/20/2017 By: Lou De La Cruz LPN No Known Allergies Current Immunizations  Reviewed on 3/20/2017 Name Date Influenza High Dose Vaccine PF 9/16/2016, 10/28/2015, 11/20/2014 Influenza Vaccine 10/9/2013 Influenza Vaccine Split 1/15/2012  5:07 PM  
 Pneumococcal Conjugate (PCV-13) 5/19/2016 Pneumococcal Vaccine (Unspecified Type) 5/13/2009 TD Vaccine 5/13/2009  Reviewed by Lou De La Cruz LPN on 5/08/9883 at  8:21 AM  
You Were Diagnosed With   
  
 Codes Comments Osteopenia    -  Primary ICD-10-CM: M85.80 ICD-9-CM: 733.90 Post menopausal problems     ICD-10-CM: N95.9 ICD-9-CM: 627.9 Gout of multiple sites, unspecified cause, unspecified chronicity     ICD-10-CM: M10.9 ICD-9-CM: 274.9 Vitals BP Pulse Temp Resp Height(growth percentile) Weight(growth percentile) 168/81 (BP 1 Location: Left arm, BP Patient Position: Sitting) 91 97.5 °F (36.4 °C) (Oral) 16 5' 5\" (1.651 m) 157 lb (71.2 kg) SpO2 BMI OB Status Smoking Status 94% 26.13 kg/m2 Postmenopausal Former Smoker Vitals History BMI and BSA Data Body Mass Index Body Surface Area  
 26.13 kg/m 2 1.81 m 2 Preferred Pharmacy Pharmacy Name [facility] [address] [place], [place] - [address] [place] [address] [phone] Your Updated Medication List  
  
   
This list is accurate as of: 3/20/17  8:36 AM.  Always use your most recent med list.  
  
  
  
  
 acetaminophen 325 mg tablet Commonly known as:  TYLENOL Take  by mouth every four (4) hours as needed for Pain. aspirin 81 mg tablet Take 81 mg by mouth. atorvastatin 20 mg tablet Commonly known as:  LIPITOR Take 1 Tab by mouth daily. BENADRYL 25 mg capsule Generic drug:  diphenhydrAMINE Take 25 mg by mouth every six (6) hours as needed. BIDIL 20-37.5 mg per tablet Generic drug:  isosorbide-hydrALAZINE  
TAKE ONE TABLET BY MOUTH THREE TIMES DAILY ferrous sulfate 325 mg (65 mg iron) Cper Take 1 Tab by mouth daily. furosemide 20 mg tablet Commonly known as:  LASIX Take 20 mg by mouth daily. metoprolol succinate 25 mg XL tablet Commonly known as:  TOPROL-XL Take 12.5 mg by mouth daily. multivit-min-FA-lycopen-lutein 0.4-300-250 mg-mcg-mcg Tab Take  by mouth. nitroglycerin 0.4 mg/hr Commonly known as:  NITRODUR  
1 Patch by TransDERmal route daily. predniSONE 5 mg tablet Commonly known as:  Justina Batres  
 TAKE ONE-HALF (1/2) TABLET (2.5 MG) DAILY ULORIC 80 mg Tab tablet Generic drug:  febuxostat TAKE 1 TABLET DAILY Follow-up Instructions Return in about 6 months (around 9/20/2017). To-Do List   
 04/19/2017 Imaging:  DEXA BONE DENSITY STUDY AXIAL Patient Instructions STOP - Prednisone 2.5mg (1/2 pill) Prednisone 2mg (2 pills) x 2 weeks Prednisone 1mg (1 pill) x 2 weeks Continue Uloric 80mg daily Introducing [facility] SERVICES! Dear [facility]: Thank you for requesting a Trapmine account. Our records indicate that you have previously registered for a Trapmine account but its currently inactive. Please call our Trapmine support line at [phone]. Additional Information If you have questions, please visit the Frequently Asked Questions section of the Trapmine website at [email or web address] Remember, Trapmine is NOT to be used for urgent needs. For medical emergencies, dial 911. Now available from your iPhone and Android! Please provide this summary of care documentation to your next provider. Your primary care clinician is listed as Karen Samson. If you have any questions after today's visit, please call [phone].

## 2017-03-20 NOTE — PATIENT INSTRUCTIONS
STOP - Prednisone 2.5mg (1/2 pill)    Prednisone 2mg (2 pills) x 2 weeks  Prednisone 1mg (1 pill) x 2 weeks    Continue Uloric 80mg daily

## 2017-05-29 NOTE — DISCHARGE INSTRUCTIONS
Costochondritis: Care Instructions  Your Care Instructions  You have chest pain because the cartilage of your rib cage is inflamed. This problem is called costochondritis. This type of chest wall pain may last from days to weeks. It is not a heart problem. Sometimes costochondritis occurs with a cold or the flu, and other times the exact cause is not known. Follow-up care is a key part of your treatment and safety. Be sure to make and go to all appointments, and call your doctor if you are having problems. Its also a good idea to know your test results and keep a list of the medicines you take. How can you care for yourself at home? · Take medicines for pain and inflammation exactly as directed. ¨ If the doctor gave you a prescription medicine, take it as prescribed. ¨ If you are not taking a prescription pain medicine, ask your doctor if you can take an over-the-counter medicine. ¨ Do not take two or more pain medicines at the same time unless the doctor told you to. Many pain medicines have acetaminophen, which is Tylenol. Too much acetaminophen (Tylenol) can be harmful. · It may help to use a warm compress or heating pad (set on low) on your chest. You can also try alternating heat and ice. Put ice or a cold pack on the area for 10 to 20 minutes at a time. Put a thin cloth between the ice and your skin. · Avoid any activity that strains the chest area. As your pain gets better, you can slowly return to your normal activities. · Do not use tape, an elastic bandage, a \"rib belt,\" or anything else that restricts your chest wall motion. When should you call for help? Call 911 anytime you think you may need emergency care. For example, call if:  · You have new or different chest pain or pressure. This may occur with:  ¨ Sweating. ¨ Shortness of breath. ¨ Nausea or vomiting. ¨ Pain that spreads from the chest to the neck, jaw, or one or both shoulders or arms. ¨ Dizziness or lightheadedness.   ¨ A fast or uneven pulse. After calling 911, chew 1 adult-strength aspirin. Wait for an ambulance. Do not try to drive yourself. · You have severe trouble breathing. Call your doctor now or seek immediate medical care if:  · You have a fever or cough. · You have any trouble breathing. · Your chest pain gets worse. Watch closely for changes in your health, and be sure to contact your doctor if:  · Your chest pain continues even though you are taking anti-inflammatory medicine. · Your chest wall pain has not improved after 5 to 7 days. Where can you learn more? Go to http://french-conner.info/. Enter B803 in the search box to learn more about \"Costochondritis: Care Instructions. \"  Current as of: May 27, 2016  Content Version: 11.2  © 0110-8054 [employer]. Care instructions adapted under license by [employer] (which disclaims liability or warranty for this information). If you have questions about a medical condition or this instruction, always ask your healthcare professional. [place] [address] any warranty or liability for your use of this information. Upper Respiratory Infection (Cold): Care Instructions  Your Care Instructions    An upper respiratory infection, or URI, is an infection of the nose, sinuses, or throat. URIs are spread by coughs, sneezes, and direct contact. The common cold is the most frequent kind of URI. The flu and sinus infections are other kinds of URIs. Almost all URIs are caused by viruses. Antibiotics won't cure them. But you can treat most infections with home care. This may include drinking lots of fluids and taking over-the-counter pain medicine. You will probably feel better in 4 to 10 days. The doctor has checked you carefully, but problems can develop later. If you notice any problems or new symptoms, get medical treatment right away. Follow-up care is a key part of your treatment and safety.  Be sure to make and go to all appointments, and call your doctor if you are having problems. It's also a good idea to know your test results and keep a list of the medicines you take. How can you care for yourself at home? · To prevent dehydration, drink plenty of fluids, enough so that your urine is light yellow or clear like water. Choose water and other caffeine-free clear liquids until you feel better. If you have kidney, heart, or liver disease and have to limit fluids, talk with your doctor before you increase the amount of fluids you drink. · Take an over-the-counter pain medicine, such as acetaminophen (Tylenol), ibuprofen (Advil, Motrin), or naproxen (Aleve). Read and follow all instructions on the label. · Before you use cough and cold medicines, check the label. These medicines may not be safe for young children or for people with certain health problems. · Be careful when taking over-the-counter cold or flu medicines and Tylenol at the same time. Many of these medicines have acetaminophen, which is Tylenol. Read the labels to make sure that you are not taking more than the recommended dose. Too much acetaminophen (Tylenol) can be harmful. · Get plenty of rest.  · Do not smoke or allow others to smoke around you. If you need help quitting, talk to your doctor about stop-smoking programs and medicines. These can increase your chances of quitting for good. When should you call for help? Call 911 anytime you think you may need emergency care. For example, call if:  · You have severe trouble breathing. Call your doctor now or seek immediate medical care if:  · You seem to be getting much sicker. · You have new or worse trouble breathing. · You have a new or higher fever. · You have a new rash. Watch closely for changes in your health, and be sure to contact your doctor if:  · You have a new symptom, such as a sore throat, an earache, or sinus pain.   · You cough more deeply or more often, especially if you notice more mucus or a change in the color of your mucus. · You do not get better as expected. Where can you learn more? Go to http://french-conner.info/. Enter Z669 in the search box to learn more about \"Upper Respiratory Infection (Cold): Care Instructions. \"  Current as of: June 30, 2016  Content Version: 11.2  © 5630-1658 [employer]. Care instructions adapted under license by [employer] (which disclaims liability or warranty for this information). If you have questions about a medical condition or this instruction, always ask your healthcare professional. [place] [address] any warranty or liability for your use of this information. Reactive Airway Disease: Care Instructions  Your Care Instructions  Reactive airway disease is a breathing problem that appears as wheezing, a whistling noise in your airways. It may be caused by a viral or bacterial infection, allergies, tobacco smoke, or something else in the environment. When you are around these triggers, your body releases chemicals that make the airways get tight. Reactive airway disease is a lot like asthma. Both can cause wheezing. But asthma is ongoing, while reactive airway disease may occur only now and then. Tests can be done to tell whether you have asthma. You may take the same medicines used to treat asthma. Good home care and follow-up care with your doctor can help you recover. Follow-up care is a key part of your treatment and safety. Be sure to make and go to all appointments, and call your doctor if you are having problems. It's also a good idea to know your test results and keep a list of the medicines you take. How can you care for yourself at home? · Take your medicines exactly as prescribed. Call your doctor if you think you are having a problem with your medicine. · Do not smoke or allow others to smoke around you.  If you need help quitting, talk to your doctor about stop-smoking programs and medicines. These can increase your chances of quitting for good. · If you know what caused your wheezing (such as perfume or the odor of household chemicals), try to avoid it in the future. · Wash your hands several times a day, and consider using hand gels or wipes that contain alcohol. This can prevent colds and other infections. When should you call for help? Call 911 anytime you think you may need emergency care. For example, call if:  · You have severe trouble breathing. Watch closely for changes in your health, and be sure to contact your doctor if:  · You cough up yellow, dark brown, or bloody mucus. · You have a fever. · Your wheezing gets worse. Where can you learn more? Go to http://french-conner.info/. Enter O751 in the search box to learn more about \"Reactive Airway Disease: Care Instructions. \"  Current as of: May 23, 2016  Content Version: 11.2  © 9848-4095 [employer]. Care instructions adapted under license by [employer] (which disclaims liability or warranty for this information). If you have questions about a medical condition or this instruction, always ask your healthcare professional. [address] any warranty or liability for your use of this information.

## 2017-05-29 NOTE — ED PROVIDER NOTES
HPI Comments: Ramin Allen is a 80 y.o. female with PMHx significant for PNA, MI, CKD, HTN, hyperlipidemia, who presents via EMS to [facility] ED with cc of persistent wet cough x 3 weeks. Patient reports yellow/green sputum production with her cough. She notes intermittent L sided chest discomfort today secondary to coughing with her last episode 2 hours ago. Patient endorses using Vicks VapoRub for her cough. She notes drinking/eating less than normal. Patient states she has not seen her PCP for her symptoms. She currently resides at home with her son and his wife. Patient denies a history of COPD. She specifically denies any recent fevers. PCP: Chico Robb MD      Social History: (former) Tobacco, (-) EtOH, (-) Illicit Drugs       There are no other complaints, changes, or physical findings at this time. The history is provided by the patient. No  was used. Past Medical History:   Diagnosis Date    CAD (coronary artery disease)     Chronic kidney disease     Glucose intolerance (impaired glucose tolerance)     Gout     Hypercholesterolemia     Hypertension     MI (myocardial infarction) ([facility] [address] 1980's    s/p angioplasty    Spondylosis     Vitamin D deficiency        Past Surgical History:   Procedure Laterality Date    CARDIAC SURG PROCEDURE UNLIST      cardiac stent    HX ANGIOPLASTY  1980s    HX CORONARY STENT PLACEMENT           Family History:   Problem Relation Age of Onset    Hypertension Mother     Stroke Sister        Social History     Social History    Marital status:      Spouse name: N/A    Number of children: N/A    Years of education: N/A     Occupational History    Not on file.      Social History Main Topics    Smoking status: Former Smoker     Years: 0.00    Smokeless tobacco: Never Used    Alcohol use No    Drug use: No    Sexual activity: No     Other Topics Concern    Not on file     Social History Narrative         ALLERGIES: Review of patient's allergies indicates no known allergies. Review of Systems   Constitutional: Negative for chills and fever. HENT: Negative for congestion, rhinorrhea, sneezing and sore throat. Eyes: Negative for redness and visual disturbance. Respiratory: Positive for cough. Negative for shortness of breath. Cardiovascular: Positive for chest pain. Negative for leg swelling. Gastrointestinal: Negative for abdominal pain, nausea and vomiting. Genitourinary: Negative for difficulty urinating and frequency. Musculoskeletal: Negative for back pain, myalgias and neck stiffness. Skin: Negative for rash. Neurological: Negative for dizziness, syncope, weakness and headaches. Hematological: Negative for adenopathy. Patient Vitals for the past 12 hrs:   Temp Pulse Resp BP SpO2   05/29/17 0319 - (!) 131 - 119/54 -   05/29/17 0315 - (!) 129 25 119/54 92 %   05/29/17 0300 - (!) 131 27 131/58 97 %   05/29/17 0245 - (!) 110 30 173/61 100 %   05/29/17 0230 - (!) 105 26 140/73 95 %   05/29/17 0215 - 90 24 153/56 100 %   05/29/17 0200 - 89 23 152/61 99 %   05/29/17 0145 - 86 23 158/61 100 %   05/29/17 0130 - 91 23 176/64 91 %   05/29/17 0115 - 86 30 169/63 91 %   05/29/17 0100 - 85 28 169/57 91 %   05/29/17 0045 - 85 23 164/57 92 %   05/29/17 0030 - 83 19 157/61 94 %   05/29/17 0015 - 86 25 168/58 94 %   05/29/17 0001 - 82 26 161/61 94 %   05/28/17 2345 - 85 23 171/61 94 %   05/28/17 2330 - 87 26 165/60 93 %   05/28/17 2315 - 87 26 175/65 93 %   05/28/17 2300 - 83 26 170/62 95 %   05/28/17 2245 - 85 17 182/66 94 %   05/28/17 2242 - - 20 - 95 %   05/28/17 2241 - - - 173/66 -   05/28/17 2232 98.9 °F (37.2 °C) 84 19 173/66 93 %       Physical Exam   Constitutional: She is oriented to person, place, and time. She appears well-developed and well-nourished. HENT:   Head: Normocephalic and atraumatic.    Mouth/Throat: Oropharynx is clear and moist.   Eyes: Conjunctivae and EOM are normal.   Neck: Normal range of motion and full passive range of motion without pain. Neck supple. Cardiovascular: Normal rate, regular rhythm, S1 normal, S2 normal, normal heart sounds, intact distal pulses and normal pulses. No murmur heard. Pulmonary/Chest: Effort normal. Tachypnea noted. No respiratory distress. She has wheezes. Bronchospastic cough   Rhonchi in all lung fields   Abdominal: Soft. Normal appearance and bowel sounds are normal. She exhibits no distension. There is no tenderness. There is no rebound. Musculoskeletal: Normal range of motion. Neurological: She is alert and oriented to person, place, and time. She has normal strength. Skin: Skin is warm, dry and intact. No rash noted. Warm to touch   Psychiatric: She has a normal mood and affect. Her speech is normal and behavior is normal. Judgment and thought content normal.   Nursing note and vitals reviewed. MDM  Number of Diagnoses or Management Options  Diagnosis management comments: DDx: URI, RAD, PNA       Amount and/or Complexity of Data Reviewed  Clinical lab tests: ordered and reviewed  Tests in the radiology section of CPT®: ordered and reviewed  Tests in the medicine section of CPT®: ordered and reviewed  Review and summarize past medical records: yes  Independent visualization of images, tracings, or specimens: yes    Patient Progress  Patient progress: stable    ED Course       Procedures     ED EKG interpretation: 22:28  Rhythm: normal sinus rhythm, 1st degree block and PVC's; and regular . Rate (approx.): 87; Axis: left axis deviation; P wave: prolonged; QRS interval: normal ; ST/T wave: non-specific changes; Other findings: abnormal ekg. This EKG was interpreted by Arabella Ferrer MD,ED Provider. 00:23 AM  Patient is having moderate breathing difficulty not relieved with any of the remedies that they have been tried prior to arrival to the Emergency Department.   I have ordered nebulized albuterol and atrovent to be given and will continue to closely evaluate her progress and the effectiveness of these medications. 1:52 AM  I re-examined the patient and evaluated the effectiveness of breathing treatment they received. I feel that there has been some improvement, but also feel that the patient would benefit clinically from further breathing treatments. I will evaluate the patient again after the next round of treatments. 2:56 AM  The patient has been examined after this most recent nebulizer breathing treatment. They are showing some clinically improved aeration upon auscultation and some decreased work of breathing. The patient also states that they are improving. I will continue to closely monitor their vital signs and their progress as this treatment continues.      LABORATORY TESTS:  Recent Results (from the past 12 hour(s))   EKG, 12 LEAD, INITIAL    Collection Time: 05/28/17 10:28 PM   Result Value Ref Range    Ventricular Rate 87 BPM    Atrial Rate 87 BPM    P-R Interval 234 ms    QRS Duration 86 ms    Q-T Interval 362 ms    QTC Calculation (Bezet) 435 ms    Calculated P Axis 64 degrees    Calculated R Axis -40 degrees    Calculated T Axis 116 degrees    Diagnosis       Sinus rhythm with 1st degree AV block with occasional premature ventricular   complexes  Left axis deviation  Minimal voltage criteria for LVH, may be normal variant  Inferior infarct (cited on or before 13-NOV-2010)  Anterolateral infarct (cited on or before 13-NOV-2010)  Abnormal ECG  When compared with ECG of 10-NOV-2016 22:43,  premature ventricular complexes are now present  aberrant conduction is no longer present     CBC WITH AUTOMATED DIFF    Collection Time: 05/28/17 11:03 PM   Result Value Ref Range    WBC 4.7 3.6 - 11.0 K/uL    RBC 3.42 (L) 3.80 - 5.20 M/uL    HGB 10.9 (L) 11.5 - 16.0 g/dL    HCT 34.4 (L) 35.0 - 47.0 %    .6 (H) 80.0 - 99.0 FL    MCH 31.9 26.0 - 34.0 PG    MCHC 31.7 30.0 - 36.5 g/dL    RDW 14.3 11.5 - 14.5 %    PLATELET 160 150 - 400 K/uL    NEUTROPHILS 64 32 - 75 %    LYMPHOCYTES 23 12 - 49 %    MONOCYTES 9 5 - 13 %    EOSINOPHILS 4 0 - 7 %    BASOPHILS 0 0 - 1 %    ABS. NEUTROPHILS 3.0 1.8 - 8.0 K/UL    ABS. LYMPHOCYTES 1.1 0.8 - 3.5 K/UL    ABS. MONOCYTES 0.4 0.0 - 1.0 K/UL    ABS. EOSINOPHILS 0.2 0.0 - 0.4 K/UL    ABS. BASOPHILS 0.0 0.0 - 0.1 K/UL   METABOLIC PANEL, COMPREHENSIVE    Collection Time: 05/28/17 11:03 PM   Result Value Ref Range    Sodium 140 136 - 145 mmol/L    Potassium 4.8 3.5 - 5.1 mmol/L    Chloride 104 97 - 108 mmol/L    CO2 32 21 - 32 mmol/L    Anion gap 4 (L) 5 - 15 mmol/L    Glucose 120 (H) 65 - 100 mg/dL    BUN 45 (H) 6 - 20 MG/DL    Creatinine 2.22 (H) 0.55 - 1.02 MG/DL    BUN/Creatinine ratio 20 12 - 20      GFR est AA 25 (L) >60 ml/min/1.73m2    GFR est non-AA 21 (L) >60 ml/min/1.73m2    Calcium 9.0 8.5 - 10.1 MG/DL    Bilirubin, total 0.4 0.2 - 1.0 MG/DL    ALT (SGPT) 21 12 - 78 U/L    AST (SGOT) 26 15 - 37 U/L    Alk. phosphatase 88 45 - 117 U/L    Protein, total 8.1 6.4 - 8.2 g/dL    Albumin 3.5 3.5 - 5.0 g/dL    Globulin 4.6 (H) 2.0 - 4.0 g/dL    A-G Ratio 0.8 (L) 1.1 - 2.2     CK W/ REFLX CKMB    Collection Time: 05/28/17 11:03 PM   Result Value Ref Range     26 - 192 U/L   TROPONIN I    Collection Time: 05/28/17 11:03 PM   Result Value Ref Range    Troponin-I, Qt. 0.04 <0.05 ng/mL   LACTIC ACID, PLASMA    Collection Time: 05/28/17 11:09 PM   Result Value Ref Range    Lactic acid 0.7 0.4 - 2.0 MMOL/L       IMAGING RESULTS:  XR CHEST PA LAT   Final Result   EXAM: XR CHEST PA LAT     INDICATION: shortness of breath     COMPARISON: Chest x-ray 11/10/2016.     FINDINGS: PA and lateral radiographs of the chest demonstrate a low volume  inspiration with elevation of the right diaphragm, unchanged from prior. The  cardiac and mediastinal contours and pulmonary vascularity are normal.   Atherosclerotic calcifications affect the aortic arch and the thoracic aorta is  tortuous.  The chest wall structures and visualized upper abdomen show no acute  findings with incidental note of degenerative spine and shoulder changes as well  as diffuse osteopenia.      IMPRESSION  IMPRESSION: No acute findings on low volume exam.       MEDICATIONS GIVEN:  Medications   albuterol-ipratropium (DUO-NEB) 2.5 MG-0.5 MG/3 ML (3 mL Nebulization Given 5/29/17 0131)   albuterol-ipratropium (DUO-NEB) 2.5 MG-0.5 MG/3 ML (3 mL Nebulization Given 5/29/17 0157)   albuterol-ipratropium (DUO-NEB) 2.5 MG-0.5 MG/3 ML (3 mL Nebulization Given 5/29/17 0236)   methylPREDNISolone (PF) (SOLU-MEDROL) injection 125 mg (125 mg IntraVENous Given 5/29/17 0236)   albuterol (PROVENTIL HFA, VENTOLIN HFA, PROAIR HFA) inhaler 2 Puff (2 Puffs Inhalation Given 5/29/17 0319)       IMPRESSION:  1. Acute upper respiratory infection    2. Costochondritis    3. Reactive airway disease, moderate persistent, with acute exacerbation        PLAN:  1. Current Discharge Medication List      START taking these medications    Details   guaiFENesin ER (MUCINEX) 600 mg ER tablet Take 1 Tab by mouth two (2) times a day for 10 days. Qty: 20 Tab, Refills: 0         CONTINUE these medications which have CHANGED    Details   predniSONE (DELTASONE) 20 mg tablet Take 3 Tabs by mouth daily for 5 days. Qty: 15 Tab, Refills: 0           2. Follow-up Information     Follow up With Details Comments Contact Info    Yvrose Garrett MD Call in 2 days  [address]  [phone]      [facility] EMERGENCY DEPT  As needed, If symptoms worsen [address]  [address] [facility]  [phone]          Return to ED if worse     Discharge Note:  3:03 AM  The patient has been re-evaluated and is ready for discharge. Reviewed available results with patient. Counseled patient on diagnosis and care plan. Patient has expressed understanding, and all questions have been answered.  Patient agrees with plan and agrees to follow up as recommended, or to return to the ED if their symptoms worsen. Discharge instructions have been provided and explained to the patient, along with reasons to return to the ED. Written by Letha Faye, ED Scribe, as dictated by Thao Boyle MD.    This note is prepared by Letha Faye, acting as Scribe for Thao Boyle MD.    Thao Boyle MD: The scribe's documentation has been prepared under my direction and personally reviewed by me in its entirety. I confirm that the note above accurately reflects all work, treatment, procedures, and medical decision making performed by me.

## 2017-05-29 NOTE — ED NOTES
Patient received discharge instructions and questions were answered by ED MD Leticia Simeon. Respirations even and unlabored, no signs or symptoms of cardiac distress noted at this time. Any wants or needs verbalized have been addressed to the best of our ability. Patient left ED by wheelchair with discharge instructions in hand. Son providing transport home.

## 2017-05-29 NOTE — ED NOTES
Patients son reported that the patient needed to change the brief she is wearing. Provided prei-care and changed brief. Repositioned up in the bed. Call bell in reach, side rails x 2, monitor x 3. Son at bedside.

## 2017-05-29 NOTE — ED NOTES
Patient complaining of dizziness and headache, states \"IT may be from my coughing but I dont know. \" Dr Walsh Bel informed.

## 2017-05-31 NOTE — PROGRESS NOTES
Date/Time:  2017 9:16 AM    Patient listed on discharge [facility]) report on 17. RRAT score: High Risk            26       Total Score        3 Relationship with PCP    4 More than 1 Admission in calendar year     Charlson Comorbidity Score        Criteria that do not apply:    Patient Living Status    Patient Length of Stay > 5    Patient Insurance is Medicare, Medicaid or Self Pay        Patient went to [facility] ED on 17 for  cough producing yellow/green sputum and left sided chest discomfort. Nurse Navigator(NN) contacted the patient by telephone to perform ED discharge assessment. Verified  and address with patient as identifiers. Provided introduction to self, and explanation of the Nurses Navigator role. The patient states that she is feeling much better today and her cough has gotten much better. The patient went to the ED on 17 for The patient states that she is still coughing a little, but she is not coughing anything up. She denies fever, nausea, or vomiting. She says that she is eating better and trying to drink plenty of fluids. Her son, Neeraj Alford, states that she has been up moving around this morning and was doing some laundry. Neeraj Alford states that he put a Vicks humidifier in her room and that seemed to have helped her. Neeraj Alford says she is doing much better today. Patient and Neeraj Alford given an opportunity to ask questions and they have no further questions or concerns at this time. No other clinical/social/functional needs noted. The patient agrees to contact the PCP office for questions related to their healthcare. NN provided contact information to the patient for future reference. The patient expressed thanks, offered no additional questions and ended the call. Diet:   Patient reports: Regular Diet    Activity:    Patient reports: mostly moving around the house, uses walker, lives with son, and does need minimal assistance with ADL's.     Medication: Performed medication reconciliation with patient, and patient verbalizes understanding of administration of home medications. There were no barriers to obtaining medications identified at this time. Support system:  patient, son and daughter    Discharge Instructions :  Reviewed discharge instructions with patient. Patient verbalizes understanding of discharge instructions and follow-up care. Red Flags:  Cough producing yellow/green sputum, fever >100.4, wheezing, SOB, difficulty breathing, decrease in appetite  Reviewed red flags with patient, and patient verbalizes understanding. Labs Reviewed:  Recent Labs      05/28/17   2303   WBC  4.7   HGB  10.9*   HCT  34.4*   PLT  160     Recent Labs      05/28/17   2303   NA  140   K  4.8   CL  104   CO2  32   GLU  120*   BUN  45*   CREA  2.22*   CA  9.0   ALB  3.5   SGOT  26   ALT  21       PCP/Specialist follow up:  Jaron Mail will call to make appointment if patient gets worse.

## 2017-06-01 PROBLEM — J96.01 ACUTE RESPIRATORY FAILURE WITH HYPOXIA (HCC): Status: ACTIVE | Noted: 2017-01-01

## 2017-06-01 PROBLEM — J20.9 ACUTE BRONCHITIS: Status: ACTIVE | Noted: 2017-01-01

## 2017-06-01 PROBLEM — I50.22 SYSTOLIC CHF, CHRONIC (HCC): Status: ACTIVE | Noted: 2017-01-01

## 2017-06-01 NOTE — TELEPHONE ENCOUNTER
Per Lisandra Carrera  Received: Today       [address] [facility] Front Office Pool                     Gris Chau, pt's son wanting to inform the doctor that they had to call the EMS for the patient again today. Pt will be taken to [facility]. Pt breathing has not improved since the past weekend, it has actually is worse per the son. Edward Rosenberg best contact [phone].

## 2017-06-01 NOTE — IP AVS SNAPSHOT
[address] [facility] 
[phone] Patient: Sunil Harley MRN: [id number] VUY:[id number] You are allergic to the following No active allergies Recent Documentation Height Weight Breastfeeding? BMI OB Status Smoking Status 1.651 m 70.1 kg No 25.73 kg/m2 Postmenopausal Former Smoker Emergency Contacts Name Discharge Info Relation Home Work Mobile Chago Lora  Child [2] [id number] Seb Hancock  Child [2] [phone] About your hospitalization You were admitted on:  June 1, 2017 You last received care in the:  [facility] 2 CARDIOPULMONARY CARE You were discharged on:  June 6, 2017 Unit phone number:  [phone] Why you were hospitalized Your primary diagnosis was:  Not on File Your diagnoses also included:  Ckd (Chronic Kidney Disease) Stage 3, Gfr 30-59 Ml/Min, Acute Bronchitis, Systolic Chf, Chronic (Hcc), Acute Respiratory Failure With Hypoxia (Hcc) Providers Seen During Your Hospitalizations Provider Role Specialty Primary office phone Angelito Hanson DO Attending Provider Emergency Medicine [phone] Ray Salinas MD Attending Provider Internal Medicine [phone] Benedict Moore MD Attending Provider Hospitalist [phone] Your Primary Care Physician (PCP) Primary Care Physician Office Phone Office Fax [address] [facility], [address] [place] [address] Road [phone] Follow-up Information Follow up With Details Comments Contact Ramos Garrett MD  The nurse navigator will call you to schedule your hospital follow up [facility] [address] [facility] [address] [address] [phone] Your Appointments Monday July 10, 2017  8:15 AM EDT  
LAB with LAB [facility] [place] [address] [place] [address] Road) [address] [facility] [address] [address] [address] [phone] Monday July 17, 2017  8:15 AM EDT ROUTINE CARE with Prudence MD Mike Del Cid [facility]) [address] 100[address] [facility] [address] [phone] Current Discharge Medication List  
  
START taking these medications Dose & Instructions Dispensing Information Comments Morning Noon Evening Bedtime  
 cefUROXime 250 mg tablet Commonly known as:  CEFTIN Your last dose was: Your next dose is:    
   
   
 Dose:  250 mg Take 1 Tab by mouth two (2) times a day for 7 days. Quantity:  14 Tab Refills:  0  
     
   
   
   
  
 guaiFENesin-dextromethorphan 100-10 mg/5 mL syrup Commonly known as:  ROBITUSSIN DM Your last dose was: Your next dose is:    
   
   
 Dose:  10 mL Take 10 mL by mouth every six (6) hours as needed for up to 10 days. Quantity:  473 mL Refills:  0 CONTINUE these medications which have CHANGED Dose & Instructions Dispensing Information Comments Morning Noon Evening Bedtime  
 acetaminophen 325 mg tablet Commonly known as:  TYLENOL What changed:   
- how much to take - when to take this 
- reasons to take this Your last dose was: Your next dose is:    
   
   
 Dose:  650 mg Take 2 Tabs by mouth every six (6) hours as needed for Pain or Fever. Quantity:  40 Tab Refills:  0  
     
   
   
   
  
 predniSONE 10 mg tablet Commonly known as:  [place] What changed:   
- medication strength 
- how much to take - when to take this 
- reasons to take this 
- additional instructions Your last dose was: Your next dose is:    
   
   
 Dose:  10 mg Take 1 Tab by mouth Multiple. Take 4 tab po daily for 3 days then Take 2 tab po daily for 3 days Then take 1 tab po daily for 3 days Then take 1/2 tab po daily for 3 days then stop Quantity:  24 Tab Refills:  0 CONTINUE these medications which have NOT CHANGED Dose & Instructions Dispensing Information Comments Morning Noon Evening Bedtime  
 aspirin 81 mg tablet Your last dose was: Your next dose is:    
   
   
 Dose:  81 mg Take 81 mg by mouth. Refills:  0  
     
   
   
   
  
 atorvastatin 20 mg tablet Commonly known as:  LIPITOR Your last dose was: Your next dose is:    
   
   
 Dose:  20 mg Take 1 Tab by mouth daily. Quantity:  90 Tab Refills:  3 BENADRYL 25 mg capsule Generic drug:  diphenhydrAMINE Your last dose was: Your next dose is:    
   
   
 Dose:  25 mg Take 25 mg by mouth every six (6) hours as needed. Refills:  0 BIDIL 20-37.5 mg per tablet Generic drug:  isosorbide-hydrALAZINE Your last dose was: Your next dose is: TAKE ONE TABLET BY MOUTH THREE TIMES DAILY Quantity:  90 Tab Refills:  12  
     
   
   
   
  
 ferrous sulfate 325 mg (65 mg iron) Cper Your last dose was: Your next dose is:    
   
   
 Dose:  1 Tab Take 1 Tab by mouth daily. Refills:  0  
     
   
   
   
  
 furosemide 20 mg tablet Commonly known as:  LASIX Your last dose was: Your next dose is:    
   
   
 Dose:  20 mg Take 1 Tab by mouth daily. Quantity:  30 Tab Refills:  1  
     
   
   
   
  
 metoprolol succinate 25 mg XL tablet Commonly known as:  TOPROL-XL Your last dose was: Your next dose is:    
   
   
 Dose:  12.5 mg Take 12.5 mg by mouth daily. Refills:  0  
     
   
   
   
  
 multivit-min-FA-lycopen-lutein 0.4-300-250 mg-mcg-mcg Tab Your last dose was: Your next dose is: Take  by mouth. Refills:  0  
     
   
   
   
  
 nitroglycerin 0.4 mg/hr Commonly known as:  NBSIWIRE Your last dose was: Your next dose is:    
   
   
 Dose:  1 Patch 1 Patch by TransDERmal route daily. Refills:  0  
     
   
   
   
  
 ULORIC 80 mg Tab tablet Generic drug:  febuxostat Your last dose was: Your next dose is: TAKE 1 TABLET DAILY Quantity:  90 Tab Refills:  1 STOP taking these medications   
 guaiFENesin  mg ER tablet Commonly known as:  [address] Where to Get Your Medications Information on where to get these meds will be given to you by the nurse or doctor. ! Ask your nurse or doctor about these medications  
  acetaminophen 325 mg tablet  
 cefUROXime 250 mg tablet  
 furosemide 20 mg tablet  
 guaiFENesin-dextromethorphan 100-10 mg/5 mL syrup  
 predniSONE 10 mg tablet Discharge Instructions HOSPITALIST DISCHARGE INSTRUCTIONS 
NAME: Mojgan Brennan :  1927 MRN:  [id number] Date/Time:  2017 8:09 AM 
 
ADMIT DATE: 2017 DISCHARGE DATE: 2017 DIAGNOSIS:  Respiratory Failure, Bronchitis, CHF, HTN, CAD, CKD, Gout MEDICATIONS: 
  
 
         As per medication reconciliation · It is important that you take the medication exactly as they are prescribed. · Keep your medication in the bottles provided by the pharmacist and keep a list of the medication names, dosages, and times to be taken in your wallet. · Do not take other medications without consulting your doctor. Pain Management: per above medications What to do at [facility] Recommended diet:  Cardiac Diet Recommended activity: Activity as tolerated If you experience any of the following symptoms then please call your primary care physician or return to the emergency room if you cannot get hold of your doctor: 
Fever, chills, nausea, vomiting, diarrhea, change in mentation, falling, bleeding, shortness of breath. CHF specific discharge instructions Weight: 
· Daily weights, Notify your Doctor if Wt gain of 3 lb in a day or 5 lb in a week · Daily Intake & Output Diet : 
· Low salt cardiac diet · Fluid restriction of 1500 ml daily Follow Up: 
 PCP you are to call and set up an appointment to see them in 2 week. F/U Cardiology Information obtained by : 
I understand that if any problems occur once I am at home I am to contact my physician. I understand and acknowledge receipt of the instructions indicated above. Physician's or R.N.'s Signature                                                                  Date/Time Patient or Representative Signature                                                          Date/Time Discharge Instructions Attachments/References HEART FAILURE ZONES: GENERAL INFO (ENGLISH) HEART FAILURE: AVOIDING TRIGGERS (ENGLISH) Discharge Orders None ACO Transitions of Care Introducing [employer] [address] Daniela Zavaleta offers a voluntary care coordination program to provide high quality service and care to Psychiatric fee-for-service beneficiaries. Aris Graham was designed to help you enhance your health and well-being through the following services: ? Transitions of Care  support for individuals who are transitioning from one care setting to another (example: Hospital to home). ? Chronic and Complex Care Coordination  support for individuals and caregivers of those with serious or chronic illnesses or with more than one chronic (ongoing) condition and those who take a number of different medications.   
 
 
If you meet specific medical criteria, a [address] [place] [address] Rd may call you directly to coordinate your care with your primary care physician and your other care providers. For questions about the [facility] programs, please, contact your physicians office. For general questions or additional information about Accountable Care Organizations: 
Please visit www.medicare.gov/acos. html or call 1-800-MEDICARE (1-480.737.1048) TTY users should call 5-916.863.7857. Narr8 Announcement We are excited to announce that we are making your provider's discharge notes available to you in Narr8. You will see these notes when they are completed and signed by the physician that discharged you from your recent hospital stay. If you have any questions or concerns about any information you see in Narr8, please call the Health Information Department where you were seen or reach out to your Primary Care Provider for more information about your plan of care. Introducing [facility]! Linda Santiagoverly introduces Narr8 patient portal. Now you can access parts of your medical record, email your doctor's office, and request medication refills online. 1. In your internet browser, go to [email or web address] 2. Click on the First Time User? Click Here link in the Sign In box. You will see the New Member Sign Up page. 3. Enter your Narr8 Access Code exactly as it appears below. You will not need to use this code after youve completed the sign-up process. If you do not sign up before the expiration date, you must request a new code. · Narr8 Access Code: [email or web address] Expires: 8/30/2017  1:16 PM 
 
4. Enter the last four digits of your Social Security Number (xxxx) and Date of Birth (mm/dd/yyyy) as indicated and click Submit. You will be taken to the next sign-up page. 5. Create a Narr8 ID. This will be your Narr8 login ID and cannot be changed, so think of one that is secure and easy to remember. 6. Create a Narr8 password. You can change your password at any time. 7. Enter your Password Reset Question and Answer. This can be used at a later time if you forget your password. 8. Enter your e-mail address. You will receive e-mail notification when new information is available in [address] 9. Click Sign Up. You can now view and download portions of your medical record. 10. Click the Download Summary menu link to download a portable copy of your medical information. If you have questions, please visit the Frequently Asked Questions section of the [employer] website. Remember, [employer] is NOT to be used for urgent needs. For medical emergencies, dial 911. Now available from your iPhone and Android! General Information Please provide this summary of care documentation to your next provider. Patient Signature:  ____________________________________________________________ Date:  ____________________________________________________________  
  
Benedicto Chaves Provider Signature:  ____________________________________________________________ Date:  ____________________________________________________________ More Information Learning About Heart Failure Zones What are heart failure zones? Heart failure zones give you an easy way to see changes in your heart failure symptoms. They also tell you when you need to get help. Check every day to see which zone you are in. Green zone. You are doing well. This is where you want to be. · Your weight is stable. This means it is not going up or down. · You breathe easily. · You are sleeping well. You are able to lie flat without shortness of breath. · You can do your usual activities. Yellow zone. Be careful. Your symptoms are changing. Call your doctor. · You have new or increased shortness of breath. · You are dizzy or lightheaded, or you feel like you may faint. · You have sudden weight gain, such as 3 pounds or more in 2 to 3 days. · You have increased swelling in your legs, ankles, or feet. · You are so tired or weak that you cannot do your usual activities. · You are not sleeping well. Shortness of breath wakes you up at night. You need extra pillows. Your doctor's name: ____________________________________________________________ Your doctor's contact information: _________________________________________________ Red zone. This is an emergency. Call 911. You have symptoms of sudden heart failure, such as: 
· You have severe trouble breathing. · You cough up pink, foamy mucus. · You have a new irregular or fast heartbeat. You have symptoms of a heart attack. These may include: · Chest pain or pressure, or a strange feeling in the chest. 
· Sweating. · Shortness of breath. · Nausea or vomiting. · Pain, pressure, or a strange feeling in the back, neck, jaw, or upper belly or in one or both shoulders or arms. · Lightheadedness or sudden weakness. · A fast or irregular heartbeat. If you have symptoms of a heart attack: After you call 911, the  may tell you to chew 1 adult-strength or 2 to 4 low-dose aspirin. Wait for an ambulance. Do not try to drive yourself. Follow-up care is a key part of your treatment and safety. Be sure to make and go to all appointments, and call your doctor if you are having problems. It's also a good idea to know your test results and keep a list of the medicines you take. Where can you learn more? Go to http://french-conner.info/. Enter T174 in the search box to learn more about \"Learning About Heart Failure Zones. \" Current as of: January 27, 2016 Content Version: 11.2 © 4456-5639 Source MDx. Care instructions adapted under license by [employer] (which disclaims liability or warranty for this information).  If you have questions about a medical condition or this instruction, always ask your healthcare professional. [employer] disclaims any warranty or liability for your use of this information. Avoiding Triggers With Heart Failure: Care Instructions Your Care Instructions Triggers are anything that make your heart failure flare up. A flare-up is also called \"sudden heart failure\" or \"acute heart failure. \" When you have a flare-up, fluid builds up in your lungs, and you have problems breathing. You might need to go to the hospital. By watching for changes in your condition and avoiding triggers, you can prevent heart failure flare-ups. Follow-up care is a key part of your treatment and safety. Be sure to make and go to all appointments, and call your doctor if you are having problems. It's also a good idea to know your test results and keep a list of the medicines you take. How can you care for yourself at home? Watch for changes in your weight and condition · Weigh yourself without clothing at the same time each day. Record your weight. Call your doctor if you gain 3 pounds or more in 2 to 3 days. A sudden weight gain may mean that your heart failure is getting worse. · Keep a daily record of your symptoms. Write down any changes in how you feel, such as new shortness of breath, cough, or problems eating. Also record if your ankles are more swollen than usual and if you have to urinate in the night more often. Note anything that you ate or did that could have triggered these changes. Limit sodium Sodium causes your body to hold on to extra water. This may cause your heart failure symptoms to get worse. People get most of their sodium from processed foods. Fast food and restaurant meals also tend to be very high in sodium. · Your doctor may suggest that you limit sodium to 2,000 milligrams (mg) a day or less. That is less than 1 teaspoon of salt a day, including all the salt you eat in cooking or in packaged foods. · Read food labels on cans and food packages.  They tell you how much sodium you get in one serving. Check the serving size. If you eat more than one serving, you are getting more sodium. · Be aware that sodium can come in forms other than salt, including monosodium glutamate (MSG), sodium citrate, and sodium bicarbonate (baking soda). MSG is often added to Asian food. You can sometimes ask for food without MSG or salt. · Slowly reducing salt will help you adjust to the taste. Take the salt shaker off the table. · Flavor your food with garlic, lemon juice, onion, vinegar, herbs, and spices instead of salt. Do not use soy sauce, steak sauce, onion salt, garlic salt, mustard, or ketchup on your food, unless it is labeled \"low-sodium\" or \"low-salt. \" 
· Make your own salad dressings, sauces, and ketchup without adding salt. · Use fresh or frozen ingredients, instead of canned ones, whenever you can. Choose low-sodium canned goods. · Eat less processed food and food from restaurants, including fast food. Exercise as directed Moderate, regular exercise is very good for your heart. It improves your blood flow and helps control your weight. But too much exercise can stress your heart and cause a heart failure flare-up. · Check with your doctor before you start an exercise program. 
· Walking is an easy way to get exercise. Start out slowly. Gradually increase the length and pace of your walk. Swimming, riding a bike, and using a treadmill are also good forms of exercise. · When you exercise, watch for signs that your heart is working too hard. You are pushing yourself too hard if you cannot talk while you are exercising. If you become short of breath or dizzy or have chest pain, stop, sit down, and rest. 
· Do not exercise when you do not feel well. Take medicines correctly · Take your medicines exactly as prescribed. Call your doctor if you think you are having a problem with your medicine. · Make a list of all the medicines you take.  Include those prescribed to you by other doctors and any over-the-counter medicines, vitamins, or supplements you take. Take this list with you when you go to any doctor. · Take your medicines at the same time every day. It may help you to post a list of all the medicines you take every day and what time of day you take them. · Make taking your medicine as simple as you can. Plan times to take your medicines when you are doing other things, such as eating a meal or getting ready for bed. This will make it easier to remember to take your medicines. · Get organized. Use helpful tools, such as daily or weekly pill containers. When should you call for help? Call 911 if you have symptoms of sudden heart failure such as: 
· You have severe trouble breathing. · You cough up pink, foamy mucus. · You have a new irregular or rapid heartbeat. Call your doctor now or seek immediate medical care if: 
· You have new or increased shortness of breath. · You are dizzy or lightheaded, or you feel like you may faint. · You have sudden weight gain, such as 3 pounds or more in 2 to 3 days. · You have increased swelling in your legs, ankles, or feet. · You are suddenly so tired or weak that you cannot do your usual activities. Watch closely for changes in your health, and be sure to contact your doctor if you develop new symptoms. Where can you learn more? Go to http://french-conner.info/. Enter D191 in the search box to learn more about \"Avoiding Triggers With Heart Failure: Care Instructions. \" Current as of: November 15, 2016 Content Version: 11.2 © 9790-4323 [employer]. Care instructions adapted under license by [employer] (which disclaims liability or warranty for this information). If you have questions about a medical condition or this instruction, always ask your healthcare professional. [address] any warranty or liability for your use of this information.

## 2017-06-01 NOTE — ED NOTES
Verbal shift change report given to Nick Yu RN (oncoming nurse) by Clementine Mohr RN (offgoing nurse). Report included the following information ED Summary. Pt currently in xray.

## 2017-06-01 NOTE — ED NOTES
Pt incontinent of urine on arrival. Depends saturated and clothing wet. Clothing removed, patient placed in gown and new depends placed.

## 2017-06-01 NOTE — ED TRIAGE NOTES
Triage Note: Pt brought by Benton Agustin from home with c/o shortness of breath and non productive cough. She was seen here Sunday with the same complaints.

## 2017-06-01 NOTE — ED PROVIDER NOTES
HPI Comments: Lydia Rodriguez is a 80 y.o. female who presents via EMS to ED [facility] ED with CC of persistent SOB x \"a couple of days. \" Pt also reports non-productive cough since onset of her symptoms. She states her symptoms are exacerbated by exertion. Per pt's family, pt was seen at ED [facility] ED over the weekend, and refused admission to Hospital. Family reports pt has hx of TIA and MI; per son, pt has hx of CHF, and had EF ~25% on last cardiac echo. Pt notes baseline leg swelling, which she states is unchanged. She specifically denies fever, chills, CP, nausea, vomiting, and ABD pain. PCP: Zeynep Hernadez MD     There are no other complaints, changes, or physical findings at this time. The history is provided by the patient, a relative and medical records. Past Medical History:   Diagnosis Date    CAD (coronary artery disease)     Chronic kidney disease     Glucose intolerance (impaired glucose tolerance)     Gout     Hypercholesterolemia     Hypertension     MI (myocardial infarction) ([facility] [address] 1980's    s/p angioplasty    Spondylosis     Vitamin D deficiency        Past Surgical History:   Procedure Laterality Date    CARDIAC SURG PROCEDURE UNLIST      cardiac stent    HX ANGIOPLASTY  1980s    HX CORONARY STENT PLACEMENT           Family History:   Problem Relation Age of Onset    Hypertension Mother     Stroke Sister        Social History     Social History    Marital status:      Spouse name: N/A    Number of children: N/A    Years of education: N/A     Occupational History    Not on file. Social History Main Topics    Smoking status: Former Smoker     Years: 0.00    Smokeless tobacco: Never Used    Alcohol use No    Drug use: No    Sexual activity: No     Other Topics Concern    Not on file     Social History Narrative         ALLERGIES: Review of patient's allergies indicates no known allergies.     Review of Systems   Constitutional: Negative for chills, fatigue and fever.   HENT: Negative. Eyes: Negative. Respiratory: Positive for cough and shortness of breath. Negative for wheezing. Cardiovascular: Negative for chest pain and leg swelling. Gastrointestinal: Negative for abdominal pain, blood in stool, constipation, diarrhea, nausea and vomiting. Endocrine: Negative. Genitourinary: Negative for difficulty urinating and dysuria. Musculoskeletal: Negative. Skin: Negative for rash. Allergic/Immunologic: Negative. Neurological: Negative for weakness and numbness. Hematological: Negative. Psychiatric/Behavioral: Negative. Patient Vitals for the past 12 hrs:   Temp Pulse Resp BP SpO2   06/01/17 1550 98.6 °F (37 °C) (!) 102 18 168/67 90 %   06/01/17 1340 - 100 17 174/74 (!) 89 %   06/01/17 1318 97.7 °F (36.5 °C) (!) 105 28 200/76 94 %            Physical Exam   Constitutional: She is oriented to person, place, and time. She appears well-developed and well-nourished. HENT:   Head: Normocephalic and atraumatic. Mouth/Throat: Mucous membranes are normal.   Eyes: EOM are normal. Pupils are equal, round, and reactive to light. Neck: Normal range of motion. No JVD present. No tracheal deviation present. Cardiovascular: Normal rate, regular rhythm, normal heart sounds and intact distal pulses. Exam reveals no gallop and no friction rub. No murmur heard. Pulmonary/Chest: Effort normal. No stridor. She has wheezes. She has rhonchi. She has no rales. Abdominal: Soft. Bowel sounds are normal. She exhibits no distension and no mass. There is no tenderness. There is no guarding. Musculoskeletal: Normal range of motion. She exhibits no edema or tenderness. Neurological: She is alert and oriented to person, place, and time. Skin: Skin is warm and dry. No rash noted. Psychiatric: She has a normal mood and affect.  Her behavior is normal. Judgment and thought content normal.        MDM  Number of Diagnoses or Management Options  Acute bronchitis, unspecified organism:   Diagnosis management comments: Pt presenting with diffuse bilateral wheezes and dyspnea, was seen on 5/29 for the same symptoms and was treated with duonebs and steroids. Cardiac workup and CXR were negative at that time. Pt is still on steroids. Will treat with duonebs, repeat labs and CXR to eval for developing pneumonia, pulmonary edema, ACS. Amount and/or Complexity of Data Reviewed  Clinical lab tests: ordered and reviewed  Tests in the radiology section of CPT®: ordered and reviewed  Tests in the medicine section of CPT®: ordered and reviewed  Obtain history from someone other than the patient: yes (Family; medical records)  Review and summarize past medical records: yes  Discuss the patient with other providers: yes (Hospitalist)  Independent visualization of images, tracings, or specimens: yes    Patient Progress  Patient progress: stable    ED Course       Procedures    2:08 PM  Pt's records reviewed; pt seen on (5/29/17) for similar symptoms, treated with duo-nebs, and had normal CXR. Pt also had normal cardiac labs. 3:37 PM  Pt re-evaluated, on second breathing treatment. Still with SpO2 ~90%. CONSULT NOTE:   4:08 PM  Aries Harrison DO spoke with Raj Watkins MD,   Specialty: Hospitalist  Discussed pt's hx, disposition, and available diagnostic and imaging results. Reviewed care plans. Consultant will evaluate pt for admission.     LABORATORY TESTS:  Recent Results (from the past 12 hour(s))   EKG, 12 LEAD, INITIAL    Collection Time: 06/01/17  1:28 PM   Result Value Ref Range    Ventricular Rate 101 BPM    Atrial Rate 101 BPM    P-R Interval 204 ms    QRS Duration 84 ms    Q-T Interval 336 ms    QTC Calculation (Bezet) 435 ms    Calculated R Axis -37 degrees    Calculated T Axis 84 degrees    Diagnosis       Sinus tachycardia  Left axis deviation  Minimal voltage criteria for LVH, may be normal variant  Inferior infarct (cited on or before 13-NOV-2010)  Anteroseptal infarct (cited on or before 13-NOV-2010)  When compared with ECG of 28-MAY-2017 22:28,  premature ventricular complexes are no longer present  AZ interval has decreased     METABOLIC PANEL, COMPREHENSIVE    Collection Time: 06/01/17  2:14 PM   Result Value Ref Range    Sodium 137 136 - 145 mmol/L    Potassium 4.7 3.5 - 5.1 mmol/L    Chloride 103 97 - 108 mmol/L    CO2 25 21 - 32 mmol/L    Anion gap 9 5 - 15 mmol/L    Glucose 124 (H) 65 - 100 mg/dL    BUN 64 (H) 6 - 20 MG/DL    Creatinine 2.26 (H) 0.55 - 1.02 MG/DL    BUN/Creatinine ratio 28 (H) 12 - 20      GFR est AA 25 (L) >60 ml/min/1.73m2    GFR est non-AA 20 (L) >60 ml/min/1.73m2    Calcium 9.9 8.5 - 10.1 MG/DL    Bilirubin, total 0.4 0.2 - 1.0 MG/DL    ALT (SGPT) 26 12 - 78 U/L    AST (SGOT) 35 15 - 37 U/L    Alk. phosphatase 77 45 - 117 U/L    Protein, total 8.9 (H) 6.4 - 8.2 g/dL    Albumin 3.8 3.5 - 5.0 g/dL    Globulin 5.1 (H) 2.0 - 4.0 g/dL    A-G Ratio 0.7 (L) 1.1 - 2.2     CK W/ CKMB & INDEX    Collection Time: 06/01/17  2:14 PM   Result Value Ref Range     26 - 192 U/L    CK - MB 4.2 (H) <3.6 NG/ML    CK-MB Index 3.8 (H) 0 - 2.5     CBC WITH AUTOMATED DIFF    Collection Time: 06/01/17  2:14 PM   Result Value Ref Range    WBC 7.7 3.6 - 11.0 K/uL    RBC 3.83 3.80 - 5.20 M/uL    HGB 12.0 11.5 - 16.0 g/dL    HCT 38.0 35.0 - 47.0 %    MCV 99.2 (H) 80.0 - 99.0 FL    MCH 31.3 26.0 - 34.0 PG    MCHC 31.6 30.0 - 36.5 g/dL    RDW 14.5 11.5 - 14.5 %    PLATELET 821 114 - 587 K/uL    NEUTROPHILS 80 %    LYMPHOCYTES 10 %    MONOCYTES 8 %    EOSINOPHILS 2 %    BASOPHILS 0 %    ABS. NEUTROPHILS 6.1 K/UL    ABS. LYMPHOCYTES 0.8 K/UL    ABS. MONOCYTES 0.6 K/UL    ABS. EOSINOPHILS 0.2 K/UL    ABS.  BASOPHILS 0.0 K/UL    RBC COMMENTS NORMOCYTIC, NORMOCHROMIC      DF MANUAL     TROPONIN I    Collection Time: 06/01/17  2:14 PM   Result Value Ref Range    Troponin-I, Qt. 0.06 (H) <0.05 ng/mL   PRO-BNP    Collection Time: 06/01/17  2:14 PM Result Value Ref Range    NT pro-BNP 4582 (H) 0 - 450 PG/ML       IMAGING RESULTS:  CXR Results  (Last 48 hours)               06/01/17 1517  XR CHEST PA LAT Final result    Impression:  IMPRESSION: No acute intrathoracic disease. Narrative:  CLINICAL HISTORY: Dyspnea    INDICATION: Dyspnea       COMPARISON: 5/20/2017       FINDINGS:    PA and lateral views of the chest are obtained. The cardiopericardial silhouette is within normal limits. There is no pleural   effusion, pneumothorax or focal consolidation present. Diminished lung volumes. Elevation the right hemidiaphragm. Aortic atherosclerotic change. Patient is on   a cardiac monitor. Osteopenia. MEDICATIONS GIVEN:  Medications   albuterol-ipratropium (DUO-NEB) 2.5 MG-0.5 MG/3 ML (3 mL Nebulization Given 6/1/17 1425)   predniSONE (DELTASONE) tablet 60 mg (60 mg Oral Given 6/1/17 1452)   albuterol-ipratropium (DUO-NEB) 2.5 MG-0.5 MG/3 ML (3 mL Nebulization Given 6/1/17 1550)       IMPRESSION:  1. Acute bronchitis, unspecified organism        PLAN:  1. Admit to Hospitalist    ADMIT NOTE:  4:08 PM  The patient is being admitted to the hospital by Dr. Aguilar Dejesus. The results of their tests and reasons for their admission have been discussed with the patient and/or available family. They convey agreement and understanding for the need to be admitted and for their admission diagnosis. This note is prepared by Facundo Mckenna, acting as Scribe for Nena Caruso DO. Nena Caruso DO: The scribe's documentation has been prepared under my direction and personally reviewed by me in its entirety. I confirm that the note above accurately reflects all work, treatment, procedures, and medical decision making performed by me.

## 2017-06-02 NOTE — PROGRESS NOTES
[facility] SHIFT NURSING NOTE    Bedside shift change report given to RN (oncoming nurse) by [facility] (offgoing nurse). Report included the following information SBAR, ED Summary, Recent Results, Med Rec Status and Cardiac Rhythm nsr. SHIFT SUMMARY: Comfortable throughout shift - remains on 2 L oxygen via nasal cannula - sats 96%. Admission Date 6/1/2017   Admission Diagnosis Acute bronchitis  Acute respiratory failure with hypoxia (HCC)  CKD (chronic kidney disease) stage 3, GFR 33-32 ml/min  Systolic CHF, chronic ([address]   Consults None        Consults   [x] PT   [x] OT   [] Speech   [] Palliative      [] Hospice    [] Case Management   [] None   Cardiac Monitoring   [x] Yes   [] No     Antibiotics   [x] Yes   [] No   GI Prophylaxis  (Ex: Protonix, Pepcid, etc,.)   [] Yes   [x] No          DVT Prophylaxis   SCDs:             Inderjit stockings:         [x] Medication (Ex: Lovenox, Eliquis, Brilinta, Coumadin,  Heparin, etc..)   [] Contraindicated   [] No VTE needed       Urinary Catheter             LDAs               Peripheral IV 06/01/17 Right Antecubital (Active)   Site Assessment Clean, dry, & intact 6/1/2017 11:16 PM   Phlebitis Assessment 0 6/1/2017 11:16 PM   Infiltration Assessment 0 6/1/2017 11:16 PM   Dressing Status Clean, dry, & intact 6/1/2017 11:16 PM   Dressing Type Transparent 6/1/2017 11:16 PM   Hub Color/Line Status Pink;Flushed 6/1/2017 11:16 PM   Action Taken Blood drawn 6/1/2017  2:13 PM       Peripheral IV 06/01/17 Left Hand (Active)   Site Assessment Clean, dry, & intact 6/1/2017 11:16 PM   Phlebitis Assessment 0 6/1/2017 11:16 PM   Infiltration Assessment 0 6/1/2017 11:16 PM   Dressing Status Clean, dry, & intact 6/1/2017 11:16 PM   Dressing Type Transparent 6/1/2017 11:16 PM   Hub Color/Line Status Yellow; Flushed 6/1/2017 11:16 PM                      I/Os   Intake/Output Summary (Last 24 hours) at 06/02/17 0016  Last data filed at 06/01/17 2316   Gross per 24 hour   Intake 220 ml   Output                0 ml   Net              220 ml         Activity Level Activity Level: Up with Assistance     Activity Assistance: Partial (one person)   Diet Active Orders   Diet    DIET CARDIAC Regular; 2 GM NA (House Low NA); Low Potassium      Purposeful Rounding every 1-2 hour? [x] Yes    Mariajose Score  Total Score: 2   Bed Alarm (If score 3 or >)   [x] Yes    [] Refused (See signed refusal form in chart)   Elton Score  Elton Score: 18       Elton Score (if score 14 or less)   [] PMT consult   [] Nutrition consult   [] Wound Care consult      []  Specialty bed         Influenza Vaccine Received Flu Vaccine for Current Season (usually Sept-March): Not Flu Season               Needs prior to discharge:   Home O2 required:    [] Yes   [x] No     If yes, how much O2 required?     Other:    Last Bowel Movement Date: 05/30/17   Readmission Risk Assessment Tool Score High Risk            28       Total Score        4 More than 1 Admission in calendar year    5 Patient Insurance is Medicare, Medicaid or Self Pay    19 Charlson Comorbidity Score        Criteria that do not apply:    Relationship with PCP    Patient Living Status    Patient Length of Stay > 5       Expected Length of Stay - - -   Actual Length of Stay 1

## 2017-06-02 NOTE — PROGRESS NOTES
TRANSFER - IN REPORT:    Verbal report received from [address] RN(name) on Sunil Harley  being received from ED(unit) for routine progression of care      Report consisted of patients Situation, Background, Assessment and   Recommendations(SBAR). Information from the following report(s) ED Summary, Procedure Summary, Intake/Output, MAR and Recent Results was reviewed with the receiving nurse. Opportunity for questions and clarification was provided. Assessment completed upon patients arrival to unit and care assumed.            Primary Nurse Nicola Caruso RN and Venita Barr RN performed a dual skin assessment on this patient No impairment noted  Elton score is 20

## 2017-06-02 NOTE — PROGRESS NOTES
Hospitalist Progress Note    NAME: Elle Taylor   :  1927   MRN:  [id number]       Interim Hospital Summary: 80 y.o. female whom presented on 2017 with      Assessment / Plan:  Acute Resp failure with Hypoxia POA  Acute bronchitis POA  Clinically:not much improvement yet   Continue current aggressive management with Iv steroids/empiric AB  Will add CTX to Zithromax  O2 to keep indira >90%, wean as tolerated, assess for home O2 on DC  Pt asking to have nebulizer at home   If no improvement in am will consider CT chest      Chronic systolic dysfunction EF 54%  HTN/CAD  IV Lasix - will increase dose to 40 mg   Closely monitor fluid balance   Cont other home meds including BP meds, Nitro patch    CKD stage III  Cr at baseline ( 2.2), monitor closely. Avoid nephrotoxic drugs, adjust all meds to GFR.     Gouty arthritis POA. Continue uloric 80 mg daily or equivalent         Code Status: DNR   Surrogate Decision Maker: daughter Kailee Nicole  # [phone]  DVT Prophylaxis: Heparin SQ    Baseline: her youngest son Mely Friend living with her , uses walker at baseline, independent with ADLs as per pt- confirmed by son at bedside  Recommended Disposition: hopefully home 2-3 days      Subjective:     Chief Complaint / Reason for Physician Visit: acute respiratory failure / bronchitis   Feeling weak/coughing   Not much improvement since admission      Discussed with RN events overnight. Review of Systems:  Symptom Y/N Comments  Symptom Y/N Comments   Fever/Chills n   Chest Pain n    Poor Appetite    Edema     Cough y   Abdominal Pain n    Sputum    Joint Pain     SOB/DENSON y   Pruritis/Rash     Nausea/vomit n   Tolerating PT/OT  Pending    Diarrhea n   Tolerating Diet y    Constipation n   Other       Could NOT obtain due to:      Objective:     VITALS:   Last 24hrs VS reviewed since prior progress note.  Most recent are:  Patient Vitals for the past 24 hrs:   Temp Pulse Resp BP SpO2   17 1520 97.7 °F (36.5 °C) 82 18 129/47 94 %   06/02/17 1150 97.4 °F (36.3 °C) 80 18 122/43 -   06/02/17 0734 97.8 °F (36.6 °C) 78 20 136/45 97 %   06/02/17 0307 97.5 °F (36.4 °C) 90 20 133/73 95 %   06/02/17 0124 - - - - 94 %   06/02/17 0002 97.4 °F (36.3 °C) 89 20 142/61 96 %   06/01/17 2130 98.2 °F (36.8 °C) 95 20 140/70 97 %   06/01/17 2030 - 89 20 - 94 %   06/01/17 2000 98.9 °F (37.2 °C) (!) 114 25 131/51 94 %   06/01/17 1948 - (!) 101 24 - 93 %   06/01/17 1942 - (!) 106 22 - -   06/01/17 1936 - 99 21 - -   06/01/17 1930 - 97 22 151/64 -   06/01/17 1900 - 93 19 137/56 95 %   06/01/17 1850 - 95 24 - 95 %   06/01/17 1840 - 95 23 - 95 %   06/01/17 1830 - 91 25 133/46 95 %   06/01/17 1800 99.5 °F (37.5 °C) 100 19 149/53 95 %   06/01/17 1730 - 94 22 140/54 91 %       Intake/Output Summary (Last 24 hours) at 06/02/17 1654  Last data filed at 06/02/17 4470   Gross per 24 hour   Intake              220 ml   Output              200 ml   Net               20 ml        PHYSICAL EXAM:  General: WD, WN. Alert, cooperative, no acute distress    EENT:  EOMI. Anicteric sclerae. MMM  Resp:  BL rhonchi, no wheezing or rales. No accessory muscle use  CV:  Regular  rhythm,  No edema  GI:  Soft, Non distended, Non tender.  +Bowel sounds  Neurologic:  Alert and oriented X 3, normal speech,   Psych:   Good insight. Not anxious nor agitated  Skin:  No rashes. No jaundice    Reviewed most current lab test results and cultures  YES  Reviewed most current radiology test results   YES  Review and summation of old records today    NO  Reviewed patient's current orders and MAR    YES  PMH/SH reviewed - no change compared to H&P  ________________________________________________________________________  Care Plan discussed with:    Comments   Patient y    Family      RN y    Care Manager     Consultant                        Multidiciplinary team rounds were held today with , nursing, pharmacist and clinical coordinator.   Patient's plan of care was discussed; medications were reviewed and discharge planning was addressed. ________________________________________________________________________  Total NON critical care TIME:  35   Minutes    Total CRITICAL CARE TIME Spent:   Minutes non procedure based      Comments   >50% of visit spent in counseling and coordination of care     ________________________________________________________________________  Miguel Waite MD     Procedures: see electronic medical records for all procedures/Xrays and details which were not copied into this note but were reviewed prior to creation of Plan. LABS:  I reviewed today's most current labs and imaging studies.   Pertinent labs include:  Recent Labs      06/02/17 0426 06/01/17   1414   WBC  8.3  7.7   HGB  10.0*  12.0   HCT  31.6*  38.0   PLT  151  213     Recent Labs      06/02/17   0426  06/01/17   1414   NA  139  137   K  4.3  4.7   CL  106  103   CO2  26  25   GLU  165*  124*   BUN  62*  64*   CREA  2.00*  2.26*   CA  8.6  9.9   MG  2.6*   --    ALB   --   3.8   TBILI   --   0.4   SGOT   --   35   ALT   --   26       Signed: Miguel Waite MD

## 2017-06-02 NOTE — PROGRESS NOTES
Occupational Therapy  Orders received and medical record reviewed. Nursing cleared pt for OT Evaluation. Upon arrival, pt was in bed, HOB raised, 2 visitors present. Pt declined OT stating that she'd been up most of the day and was too tired. Encouraged pt to participate, described evaluation demands/plan, but pt unwilling to participate at this time. Pt provided baseline information. Pt lives with her son who performs IADLs-transportation, shopping, etc.  Pt reports that she is busy indoors performing household chores. Pt uses her rollator in her home and in the community. She reports no falls. She is able to perform basic self care independently, but has assistance for going in her cut out tub where she has grab bars and a shower seat. Pt has a BSC at her bed and over her toilet at home. Since pt declines to participate at this time, will defer and continue to follow to initiate OT Evaluation.   8 minutes

## 2017-06-02 NOTE — PROGRESS NOTES
This is a 80 yr old AAF who has been admitted due to acute respiratory failure with hypoxia, acute bronchitis with a history of CVA and HF. Patient lives with her son who does all of patient's IADL's. Patient is able to do her own ADL's and get around the home using a rollator. Upon visiting with patient/son, the son requested a nebulizer machine for home and this writer will check on insurance coverage. At home patient is not on oxygen but currently needing 2L o2. Will follow for both therapy recommendations as well as to assist if home oxygen is needed at discharge. Care Management Interventions  PCP Verified by CM:  Yes  Discharge Durable Medical Equipment: No (owns a rolling walker and BSC)  Physical Therapy Consult: Yes  Occupational Therapy Consult: Yes  Speech Therapy Consult: No  Current Support Network: Own Home (Lives with her son who does shopping and IADl's)  Confirm Follow Up Transport: Family  Plan discussed with Pt/Family/Caregiver: Yes  Freedom of Choice Offered: Yes     Ex [id number]

## 2017-06-02 NOTE — PROGRESS NOTES
Patient refusing PT evaluation at this time despite education provided. She is agreeable to participation tomorrows date. Please refer to OT note for clarification regarding PLOF. Will f/u tomorrow. Hillary Gitelman PT, DPT.

## 2017-06-02 NOTE — PROGRESS NOTES
Bedside and Verbal shift change report given to Myla Groves RN   (oncoming nurse) by Ewa Joseph RN (offgoing nurse). Report included the following information SBAR, Kardex, ED Summary, Intake/Output, MAR, Accordion and Recent Results.

## 2017-06-02 NOTE — CARDIO/PULMONARY
Cardiopulmonary Rehab Nursing Entry:    Chart reviewed. Pt 79 yo admitted with acute bronchitis, chronic systolic dysfunction EF 04%, PMHx of HTN, CAD. Received CHF instruction during 6/2016 admission. This was a f/u visit from prior admission to reinforce education on Heart Failure Zones, symptoms to monitor, adhering to low sodium diet recommendations, obtaining a daily weight, medication compliance and remaining active. Written materials provided as a resource. Pt stating that she does not use table salt when eating or cooking that she does eat fresh vegetables. She weighs herself every other day and states she is to call the doctor if she gains 5lbs. She uses a pillbox and she is walks around inside her home. Pt without additional questions.

## 2017-06-02 NOTE — H&P
Hospitalist Admission Note    NAME: Dewey Treviño   :  1927   MRN:  [id number]     Date/Time:  2017 8:18 PM    Patient PCP: Lennie Emmanuel MD Cardio= Dr Kip Young,  Renal= Dr Karoline Fountain?  ________________________________________________________________________    My assessment of this patient's clinical condition and my plan of care is as follows. Assessment / Plan:  Acute Resp failure with Hypoxia POA- 89% on RA in ER, not on home oxygen  Acute bronchitis POA - failed OP PO therapy  CXR neg  WBC normal    Admit to telemetry bed  Oxygen for now- taper as able in next 24-48 hrs  IV steroids for now  Start Azithromycin empiric for now    Duonebs  Mucinex, robitussin DM      Chronic systolic dysfunction EF 74%  POA  HTN/CAD  Trop neg  ProBNP 4582  EKG= sinus tachycardia     IV Lasix for now, keep the dose same for now  Cont daily weight; Is &Os  watch electrolytes and cr with CKD    Cont other home meds including BP meds, Nitro patch     CKD stage III - Cr stable ~ 2.2 today  Cr at baseline, monitor closely with diureses    Avoid nephrotoxic drugs, adjust all meds to GFR.      Gouty arthritis POA   Continue uloric 80 mg daily or equivalent    Code Status: DNR as per pt's wishes, confirmed with son at bedside tonight  Surrogate Decision Maker: daughter Nury Cramer as per son at bedside # [phone]    DVT Prophylaxis: Heparin SQ  GI Prophylaxis: not indicated    Baseline: her youngest son Tono Fregoso living with her , uses walker at baseline, independent with ADLs as per pt- confirmed by son at bedside        Subjective:   CHIEF COMPLAINT: Worsening SOB with cough X few days    HISTORY OF PRESENT ILLNESS:     Cailin Fong is a 80 y.o.  female who presents with above complains from home via EMS. Pt presents with CC of worsening SOB x few days after recently seen in ED over the last weekend- pt had declined admission then.   H/o associated cough with sputum since last 24 hrs  Denies high fever, chest pain, palpitations, weight gain  Pt was DC home from ER over weekend on PO prednisone with no PO Abx    Pt was found to have a neg CXR again tonight in ER but was found to have Mild hypoxia in ER at rest requiring Oxygen support to keep sats >90%    We were asked to admit for work up and evaluation of the above problems. Past Medical History:   Diagnosis Date    CAD (coronary artery disease)     Chronic kidney disease     Glucose intolerance (impaired glucose tolerance)     Gout     Hypercholesterolemia     Hypertension     MI (myocardial infarction) ([facility] [address] 1980's    s/p angioplasty    Spondylosis     Vitamin D deficiency         Past Surgical History:   Procedure Laterality Date    CARDIAC SURG PROCEDURE UNLIST      cardiac stent    HX ANGIOPLASTY  1980s    HX CORONARY STENT PLACEMENT         Social History   Substance Use Topics    Smoking status: Former Smoker     Years: 0.00    Smokeless tobacco: Never Used    Alcohol use No        Family History   Problem Relation Age of Onset    Hypertension Mother     Stroke Sister      No Known Allergies     Prior to Admission medications    Medication Sig Start Date End Date Taking? Authorizing Provider   predniSONE (DELTASONE) 20 mg tablet Take 3 Tabs by mouth daily for 5 days. 5/29/17 6/3/17  Thao Boyle MD   guaiFENesin ER [facility]) 600 mg ER tablet Take 1 Tab by mouth two (2) times a day for 10 days. 5/29/17 6/8/17  Thao Boyle MD   ULORIC 80 mg tab tablet TAKE 1 TABLET DAILY 12/13/16   Amber Brown MD   diphenhydrAMINE (BENADRYL) 25 mg capsule Take 25 mg by mouth every six (6) hours as needed. Historical Provider   acetaminophen (TYLENOL) 325 mg tablet Take  by mouth every four (4) hours as needed for Pain. Historical Provider   multivit-min-FA-lycopen-lutein 0.4-300-250 mg-mcg-mcg tab Take  by mouth.     Historical Provider   ferrous sulfate 325 mg (65 mg iron) cpER Take 1 Tab by mouth daily.    Historical Provider   nitroglycerin (NITRODUR) 0.4 mg/hr 1 Patch by TransDERmal route daily. Historical Provider   metoprolol succinate (TOPROL-XL) 25 mg XL tablet Take 12.5 mg by mouth daily. Historical Provider   furosemide (LASIX) 20 mg tablet Take 20 mg by mouth daily. Historical Provider   atorvastatin (LIPITOR) 20 mg tablet Take 1 Tab by mouth daily. 6/20/14   Shyla Grayson MD   BIDIL 20-37.5 mg per tablet TAKE ONE TABLET BY MOUTH THREE TIMES DAILY 5/27/14   Shyla Grayson MD   aspirin 81 mg tablet Take 81 mg by mouth.     Historical Provider       REVIEW OF SYSTEMS:           Total of 12 systems reviewed as follows:       POSITIVE= underlined text  Negative = text not underlined  General:  fever, chills, sweats, generalized weakness, weight loss/gain,      loss of appetite   Eyes:    blurred vision, eye pain, loss of vision, double vision  ENT:    rhinorrhea, pharyngitis   Respiratory:   cough, sputum production, SOB, DENSON, wheezing, pleuritic pain   Cardiology:   chest pain, palpitations, orthopnea, PND, edema, syncope   Gastrointestinal:  abdominal pain , N/V, diarrhea, dysphagia, constipation, bleeding   Genitourinary:  frequency, urgency, dysuria, hematuria, incontinence   Muskuloskeletal :  arthralgia, myalgia, back pain  Hematology:  easy bruising, nose or gum bleeding, lymphadenopathy   Dermatological: rash, ulceration, pruritis, color change / jaundice  Endocrine:   hot flashes or polydipsia   Neurological:  headache, dizziness, confusion, focal weakness, paresthesia,     Speech difficulties, memory loss, gait difficulty  Psychological: Feelings of anxiety, depression, agitation    Objective:   VITALS:    Visit Vitals    /67 (BP 1 Location: Left arm, BP Patient Position: At rest)    Pulse 92    Temp 99.5 °F (37.5 °C)    Resp 18    Ht 5' 5\" (1.651 m)    Wt 68 kg (150 lb)    SpO2 95%    BMI 24.96 kg/m2       PHYSICAL EXAM:    General:    Alert, cooperative, no distress, appears stated age. HEENT: Atraumatic, anicteric sclerae, pink conjunctivae     No oral ulcers, mucosa moist, throat clear, dentition fair  Neck:  Supple, symmetrical,  thyroid: non tender  Lungs:   Mild Wheezing noted +, basal  Crackles noted +  Chest wall:  No tenderness  No Accessory muscle use. Heart:   Regular  rhythm,  No  murmur  Trace LE edema +  Abdomen:   Soft, non-tender. Not distended. Bowel sounds normal  Extremities: No cyanosis. No clubbing,      Skin turgor normal, Capillary refill normal, Radial dial pulse 2+  Skin:     Not pale. Not Jaundiced  No rashes   Psych:  Good insight. Not depressed. Not anxious or agitated. Neurologic: EOMs intact. No facial asymmetry. No aphasia or slurred speech. Symmetrical strength, Sensation grossly intact. Alert and oriented X 4.     _______________________________________________________________________  Care Plan discussed with:    Comments   Patient x    Family  x Son at bedside in ER   RN x    Care Manager                    Consultant:      _______________________________________________________________________  Expected  Disposition:   Home with Family x   HH/PT/OT/RN ?   SNF/LTC    JUSTO    ________________________________________________________________________  TOTAL TIME:  64 Minutes    Critical Care Provided     Minutes non procedure based      Comments    x Reviewed previous records   >50% of visit spent in counseling and coordination of care x Discussion with patient and/or family and questions answered       ________________________________________________________________________  Signed: Joyce Alegre MD    Procedures: see electronic medical records for all procedures/Xrays and details which were not copied into this note but were reviewed prior to creation of Plan.     LAB DATA REVIEWED:    Recent Results (from the past 24 hour(s))   EKG, 12 LEAD, INITIAL    Collection Time: 06/01/17  1:28 PM   Result Value Ref Range Ventricular Rate 101 BPM    Atrial Rate 101 BPM    P-R Interval 204 ms    QRS Duration 84 ms    Q-T Interval 336 ms    QTC Calculation (Bezet) 435 ms    Calculated R Axis -37 degrees    Calculated T Axis 84 degrees    Diagnosis       Sinus tachycardia  Left axis deviation  Minimal voltage criteria for LVH, may be normal variant  Inferior infarct (cited on or before 13-NOV-2010)  Anteroseptal infarct (cited on or before 13-NOV-2010)  When compared with ECG of 28-MAY-2017 22:28,  premature ventricular complexes are no longer present  SC interval has decreased     METABOLIC PANEL, COMPREHENSIVE    Collection Time: 06/01/17  2:14 PM   Result Value Ref Range    Sodium 137 136 - 145 mmol/L    Potassium 4.7 3.5 - 5.1 mmol/L    Chloride 103 97 - 108 mmol/L    CO2 25 21 - 32 mmol/L    Anion gap 9 5 - 15 mmol/L    Glucose 124 (H) 65 - 100 mg/dL    BUN 64 (H) 6 - 20 MG/DL    Creatinine 2.26 (H) 0.55 - 1.02 MG/DL    BUN/Creatinine ratio 28 (H) 12 - 20      GFR est AA 25 (L) >60 ml/min/1.73m2    GFR est non-AA 20 (L) >60 ml/min/1.73m2    Calcium 9.9 8.5 - 10.1 MG/DL    Bilirubin, total 0.4 0.2 - 1.0 MG/DL    ALT (SGPT) 26 12 - 78 U/L    AST (SGOT) 35 15 - 37 U/L    Alk.  phosphatase 77 45 - 117 U/L    Protein, total 8.9 (H) 6.4 - 8.2 g/dL    Albumin 3.8 3.5 - 5.0 g/dL    Globulin 5.1 (H) 2.0 - 4.0 g/dL    A-G Ratio 0.7 (L) 1.1 - 2.2     CK W/ CKMB & INDEX    Collection Time: 06/01/17  2:14 PM   Result Value Ref Range     26 - 192 U/L    CK - MB 4.2 (H) <3.6 NG/ML    CK-MB Index 3.8 (H) 0 - 2.5     CBC WITH AUTOMATED DIFF    Collection Time: 06/01/17  2:14 PM   Result Value Ref Range    WBC 7.7 3.6 - 11.0 K/uL    RBC 3.83 3.80 - 5.20 M/uL    HGB 12.0 11.5 - 16.0 g/dL    HCT 38.0 35.0 - 47.0 %    MCV 99.2 (H) 80.0 - 99.0 FL    MCH 31.3 26.0 - 34.0 PG    MCHC 31.6 30.0 - 36.5 g/dL    RDW 14.5 11.5 - 14.5 %    PLATELET 638 109 - 116 K/uL    NEUTROPHILS 80 %    LYMPHOCYTES 10 %    MONOCYTES 8 %    EOSINOPHILS 2 %    BASOPHILS 0 %    ABS. NEUTROPHILS 6.1 K/UL    ABS. LYMPHOCYTES 0.8 K/UL    ABS. MONOCYTES 0.6 K/UL    ABS. EOSINOPHILS 0.2 K/UL    ABS.  BASOPHILS 0.0 K/UL    RBC COMMENTS NORMOCYTIC, NORMOCHROMIC      DF MANUAL     TROPONIN I    Collection Time: 06/01/17  2:14 PM   Result Value Ref Range    Troponin-I, Qt. 0.06 (H) <0.05 ng/mL   PRO-BNP    Collection Time: 06/01/17  2:14 PM   Result Value Ref Range    NT pro-BNP 4582 (H) 0 - 450 PG/ML

## 2017-06-03 NOTE — PROGRESS NOTES
Bedside and Verbal shift change report given to Felicia May RN   (oncoming nurse) by Marguerite Rasmussen RN (offgoing nurse). Report included the following information SBAR, Kardex, ED Summary, Intake/Output, MAR, Accordion and Recent Results.

## 2017-06-03 NOTE — PROGRESS NOTES
[address] SHIFT NURSING NOTE    Bedside and Verbal shift change report given to [id number] PAVANHannah ALLISONHannah [facility] ([facility] nurse) by Safia Biggs (offgoing nurse). Report included the following information SBAR. SHIFT SUMMARY:         Admission Date 6/1/2017   Admission Diagnosis Acute bronchitis  Acute respiratory failure with hypoxia (HCC)  CKD (chronic kidney disease) stage 3, GFR 13-33 ml/min  Systolic CHF, chronic (HCC)   Consults None        Consults   [x] PT   [x] OT   [] Speech   [] Palliative      [] Hospice    [x] Case Management   [] None   Cardiac Monitoring   [x] Yes   [] No     Antibiotics   [x] Yes   [] No   GI Prophylaxis  (Ex: Protonix, Pepcid, etc,.)   [] Yes   [x] No          DVT Prophylaxis   SCDs:             Inderjit stockings:         [x] Medication (Ex: Lovenox, Eliquis, Brilinta, Coumadin,  Heparin, etc..)   [] Contraindicated   [] No VTE needed       Urinary Catheter             LDAs               Peripheral IV 06/01/17 Right Antecubital (Active)   Site Assessment Clean, dry, & intact 6/2/2017 10:01 PM   Phlebitis Assessment 0 6/2/2017 10:01 PM   Infiltration Assessment 0 6/2/2017 10:01 PM   Dressing Status Clean, dry, & intact 6/2/2017 10:01 PM   Dressing Type Transparent;Tape 6/2/2017 10:01 PM   Hub Color/Line Status Pink; Infusing;Flushed 6/2/2017 10:01 PM   Action Taken Blood drawn 6/1/2017  2:13 PM       Peripheral IV 06/01/17 Left Hand (Active)   Site Assessment Clean, dry, & intact 6/2/2017 10:01 PM   Phlebitis Assessment 0 6/2/2017 10:01 PM   Infiltration Assessment 0 6/2/2017 10:01 PM   Dressing Status Clean, dry, & intact 6/2/2017 10:01 PM   Dressing Type Transparent;Tape 6/2/2017 10:01 PM   Hub Color/Line Status Yellow;Capped;Flushed 6/2/2017 10:01 PM                      I/Os   Intake/Output Summary (Last 24 hours) at 06/02/17 2204  Last data filed at 06/02/17 2130   Gross per 24 hour   Intake              470 ml   Output              200 ml   Net              270 ml         Activity Level Activity Level: Up with Assistance     Activity Assistance: Partial (one person)   Diet Active Orders   Diet    DIET CARDIAC Regular; 2 GM NA (House Low NA); Low Potassium      Purposeful Rounding every 1-2 hour? [x] Yes    Mariajose Score  Total Score: 2   Bed Alarm (If score 3 or >)   [x] Yes    [] Refused (See signed refusal form in chart)   Elton Score  Elton Score: 18       Elton Score (if score 14 or less)   [] PMT consult   [] Nutrition consult   [] Wound Care consult      []  Specialty bed         Influenza Vaccine Received Flu Vaccine for Current Season (usually Sept-March): Not Flu Season               Needs prior to discharge:   Home O2 required:    [x] Yes   [] No     If yes, how much O2 required?     Other:    Last Bowel Movement Date: 06/01/17   Readmission Risk Assessment Tool Score High Risk            28       Total Score        4 More than 1 Admission in calendar year    5 Patient Insurance is Medicare, Medicaid or Self Pay    19 Charlson Comorbidity Score        Criteria that do not apply:    Relationship with PCP    Patient Living Status    Patient Length of Stay > 5       Expected Length of Stay 3d 19h   Actual Length of Stay 1

## 2017-06-03 NOTE — PROGRESS NOTES
Hospitalist Progress Note    NAME: Dafne Wallace   :  1927   MRN:  [id number]       Interim Hospital Summary: 80 y.o. female whom presented on 2017 with      Assessment / Plan:  Acute Resp failure with Hypoxia POA  Acute bronchitis POA +/_ acute on chronic HF   Clinically:slightly better   Continue current aggressive management with Iv steroids/empiric AB  Continue with diureses   Will add CTX to Zithromax  O2 to keep indira >90%, wean as tolerated, assess for home O2 on DC  Pt asking to have nebulizer at home   If no improvement in am will consider CT chest      Acute on Chronic systolic dysfunction EF 36%  HTN/CAD  Continue diurese with IV Lasix  Closely monitor fluid balance   Cont other home meds including BP meds, Nitro patch    CKD stage III  Cr at baseline ( 2.2), monitor closely. Avoid nephrotoxic drugs, adjust all meds to GFR.     Gouty arthritis POA. Continue uloric 80 mg daily or equivalent         Code Status: DNR   Surrogate Decision Maker: daughter Shirin Batch  # [phone]  DVT Prophylaxis: Heparin SQ    Baseline: her youngest son Thao Bailey living with her , uses walker at baseline, independent with ADLs as per pt- confirmed by son at bedside  Recommended Disposition: hopefully home 2-3 days      Subjective:     Chief Complaint / Reason for Physician Visit: acute respiratory failure / bronchitis   Dyspnea: somewhat better   Feeling weak/coughing     Discussed with RN events overnight. Review of Systems:  Symptom Y/N Comments  Symptom Y/N Comments   Fever/Chills n   Chest Pain n    Poor Appetite    Edema     Cough y   Abdominal Pain n    Sputum    Joint Pain     SOB/DENSON y   Pruritis/Rash     Nausea/vomit n   Tolerating PT/OT  Pending   Declined today    Diarrhea n   Tolerating Diet y    Constipation n   Other       Could NOT obtain due to:      Objective:     VITALS:   Last 24hrs VS reviewed since prior progress note.  Most recent are:  Patient Vitals for the past 24 hrs:   Temp Pulse Resp BP SpO2   06/03/17 1140 98 °F (36.7 °C) 93 20 133/67 93 %   06/03/17 0732 - - - - 99 %   06/03/17 0731 97.8 °F (36.6 °C) 84 20 158/62 99 %   06/03/17 0321 98 °F (36.7 °C) (!) 102 20 156/60 100 %   06/02/17 2341 98.1 °F (36.7 °C) 93 18 150/62 100 %   06/02/17 1944 97.9 °F (36.6 °C) 91 18 135/52 96 %   06/02/17 1520 97.7 °F (36.5 °C) 82 18 129/47 94 %       Intake/Output Summary (Last 24 hours) at 06/03/17 1333  Last data filed at 06/02/17 2130   Gross per 24 hour   Intake              250 ml   Output                0 ml   Net              250 ml        PHYSICAL EXAM:  General: WD, WN. Alert, cooperative, no acute distress    EENT:  EOMI. Anicteric sclerae. MMM  Resp:  BL rhonchi, no wheezing or rales. No accessory muscle use  CV:  Regular  rhythm,  No edema  GI:  Soft, Non distended, Non tender.  +Bowel sounds  Neurologic:  Alert and oriented X 3, normal speech,   Psych:   Good insight. Not anxious nor agitated  Skin:  No rashes. No jaundice    Reviewed most current lab test results and cultures  YES  Reviewed most current radiology test results   YES  Review and summation of old records today    NO  Reviewed patient's current orders and MAR    YES  PMH/SH reviewed - no change compared to H&P  ________________________________________________________________________  Care Plan discussed with:    Comments   Patient y    Family      RN y    Care Manager     Consultant                        Multidiciplinary team rounds were held today with , nursing, pharmacist and clinical coordinator. Patient's plan of care was discussed; medications were reviewed and discharge planning was addressed.      ________________________________________________________________________  Total NON critical care TIME:  35   Minutes    Total CRITICAL CARE TIME Spent:   Minutes non procedure based      Comments   >50% of visit spent in counseling and coordination of care ________________________________________________________________________  Gabe Cortez MD     Procedures: see electronic medical records for all procedures/Xrays and details which were not copied into this note but were reviewed prior to creation of Plan. LABS:  I reviewed today's most current labs and imaging studies.   Pertinent labs include:  Recent Labs      06/02/17   0426  06/01/17   1414   WBC  8.3  7.7   HGB  10.0*  12.0   HCT  31.6*  38.0   PLT  151  213     Recent Labs      06/02/17   0426  06/01/17   1414   NA  139  137   K  4.3  4.7   CL  106  103   CO2  26  25   GLU  165*  124*   BUN  62*  64*   CREA  2.00*  2.26*   CA  8.6  9.9   MG  2.6*   --    ALB   --   3.8   TBILI   --   0.4   SGOT   --   35   ALT   --   26       Signed: Gabe Cortez MD

## 2017-06-03 NOTE — PROGRESS NOTES
physical Therapy EVALUATION/DISCHARGE  Patient: Jayy Terry (19 y.o. female)  Date: 6/3/2017  Primary Diagnosis: Acute bronchitis  Acute respiratory failure with hypoxia (HCC)  CKD (chronic kidney disease) stage 3, GFR 53-73 ml/min  Systolic CHF, chronic (HCC)        Precautions:   DNR, Bed Alarm  ASSESSMENT :  Based on the objective data described below, the patient presents with good strength, good functional mobility, and steady gait following admission for acute bronchitis. PTA patient lives with her son and family. She ambulates mod I with rollator and is independent with ADLs. She denies any falls. She does not wear home O2. Currently, patient received resting in bed, agreeable to PT. Patient is independent with bed mobility. Patient required supervision for sit <> stand with RW. Patient ambulated 30 feet with supervision and RW. Patient with steady gait with support of RW and no LOB noted. Patient reports she feels at her baseline. O2 sats remained stable on 2L O2, >95% with activity. Patient can likely soon to wean off O2 when cleared by nursing. Patient left sitting in the chair with the bed alarm on and family present at the conclusion of PT evaluation. Encouraged patient to be up in the chair for all meals and ambulate with nursing staff to maintain strength during hospital stay. Patient will likely not need PT services at discharge. Skilled physical therapy is not indicated at this time. PLAN :  Discharge Recommendations: None  Further Equipment Recommendations for Discharge: none     SUBJECTIVE:   Patient stated I feel normal walking around.     OBJECTIVE DATA SUMMARY:   HISTORY:    Past Medical History:   Diagnosis Date    CAD (coronary artery disease)     Chronic kidney disease     Glucose intolerance (impaired glucose tolerance)     Gout     Hypercholesterolemia     Hypertension     [facility]    s/p angioplasty    Spondylosis     Vitamin D deficiency Past Surgical History:   Procedure Laterality Date    CARDIAC SURG PROCEDURE UNLIST      cardiac stent    HX ANGIOPLASTY  1980s    HX CORONARY STENT PLACEMENT       Prior Level of Function/Home Situation: patient lives with her son and family. She ambulates mod I with rollator and is independent with ADLs. She denies any falls. She does not wear home O2. Personal factors and/or comorbidities impacting plan of care:     Home Situation  Home Environment: Private residence  # Steps to Enter: 0  Wheelchair Ramp: Yes  One/Two Story Residence: One story  Living Alone: No  Support Systems: Child(tamera), Family member(s)  Patient Expects to be Discharged to[de-identified] Private residence  Current DME Used/Available at Home: Walker, rollator, Shower chair, Grab bars, Raised toilet seat, Hospital bed  Tub or Shower Type: Shower    EXAMINATION/PRESENTATION/DECISION MAKING:   Critical Behavior:              Hearing: Auditory  Auditory Impairment: None  Skin:    Edema:   Range Of Motion:  AROM: Within functional limits           PROM: Within functional limits           Strength:    Strength: Generally decreased, functional                    Tone & Sensation:   Tone: Normal              Sensation: Intact               Coordination:  Coordination: Within functional limits  Vision:      Functional Mobility:  Bed Mobility:  Rolling: Independent  Supine to Sit: Independent     Scooting: Independent  Transfers:  Sit to Stand: Supervision  Stand to Sit: Supervision        Bed to Chair: Supervision              Balance:   Sitting: Intact; Without support  Standing: Intact; With support  Ambulation/Gait Training:  Distance (ft): 30 Feet (ft)  Assistive Device: Gait belt;Walker, rolling  Ambulation - Level of Assistance: Supervision     Gait Description (WDL): Exceptions to WDL  Gait Abnormalities: Decreased step clearance        Base of Support: Widened     Speed/Simona: Pace decreased (<100 feet/min)  Step Length: Left shortened;Right shortened                         Stairs: Therapeutic Exercises:       Functional Measure:  Barthel Index:    Bathin  Bladder: 5  Bowels: 10  Groomin  Dressin  Feeding: 10  Mobility: 0  Stairs: 0  Toilet Use: 10  Transfer (Bed to Chair and Back): 15  Total: 65       Barthel and G-code impairment scale:  Percentage of impairment CH  0% CI  1-19% CJ  20-39% CK  40-59% CL  60-79% CM  80-99% CN  100%   Barthel Score 0-100 100 99-80 79-60 59-40 20-39 1-19   0   Barthel Score 0-20 20 17-19 13-16 9-12 5-8 1-4 0      The Barthel ADL Index: Guidelines  1. The index should be used as a record of what a patient does, not as a record of what a patient could do. 2. The main aim is to establish degree of independence from any help, physical or verbal, however minor and for whatever reason. 3. The need for supervision renders the patient not independent. 4. A patient's performance should be established using the best available evidence. Asking the patient, friends/relatives and nurses are the usual sources, but direct observation and common sense are also important. However direct testing is not needed. 5. Usually the patient's performance over the preceding 24-48 hours is important, but occasionally longer periods will be relevant. 6. Middle categories imply that the patient supplies over 50 per cent of the effort. 7. Use of aids to be independent is allowed. Errol Marquez., Barthel, D.W. (0228). Functional evaluation: the Barthel Index. [facility] (14)2. ADELSO Jiménez, Caio Mackey., Tyrel Das., Ashok Baca, [facility] (). Measuring the change indisability after inpatient rehabilitation; comparison of the responsiveness of the Barthel Index and Functional [place] Measure. Journal of Neurology, Neurosurgery, and Psychiatry, 66(4), 749-783.   EL Do, LUIS Zhang, & Tiana Rivera MHannahA. (2004.) Assessment of post-stroke quality of life in cost-effectiveness studies: The usefulness of the Barthel Index and the EuroQoL-5D. Quality of Life Research, 13, 081-57         G codes: In compliance with CMSs Claims Based Outcome Reporting, the following G-code set was chosen for this patient based on their primary functional limitation being treated: The outcome measure chosen to determine the severity of the functional limitation was the Barthel with a score of 65/100 which was correlated with the impairment scale. ? Mobility - Walking and Moving Around:     - CURRENT STATUS: CJ - 20%-39% impaired, limited or restricted    - GOAL STATUS: CJ - 20%-39% impaired, limited or restricted    - D/C STATUS:  CJ - 20%-39% impaired, limited or restricted        Physical Therapy Evaluation Charge Determination   History Examination Presentation Decision-Making   LOW Complexity : Zero comorbidities / personal factors that will impact the outcome / POC MEDIUM Complexity : 3 Standardized tests and measures addressing body structure, function, activity limitation and / or participation in recreation  MEDIUM Complexity : Evolving with changing characteristics  Other outcome measures Barthel  LOW       Based on the above components, the patient evaluation is determined to be of the following complexity level: LOW     Pain:  Pain Scale 1: Numeric (0 - 10)  Pain Intensity 1: 0  Pain Location 1: Chest  Activity Tolerance: At baseline. O2 sats stable on 2L O2. Please refer to the flowsheet for vital signs taken during this treatment. After treatment:   [x]   Patient left in no apparent distress sitting up in chair  []   Patient left in no apparent distress in bed  [x]   Call bell left within reach  [x]   Nursing notified  [x]   Caregiver present  [x]   Bed alarm activated    COMMUNICATION/EDUCATION:   Communication/Collaboration:  [x]   Fall prevention education was provided and the patient/caregiver indicated understanding.   [x]   Patient/family have participated as able and agree with findings and recommendations. []   Patient is unable to participate in plan of care at this time.   Findings and recommendations were discussed with: Registered Nurse and     Thank you for this referral.  Masoud Kohler, PT, DPT   Time Calculation: 12 mins

## 2017-06-04 NOTE — PROGRESS NOTES
Bedside and Verbal shift change report given to Adonay Boggs (oncoming nurse) by Italo He RN (offgoing nurse). Report included the following information SBAR, Kardex, Intake/Output, MAR, Recent Results and Cardiac Rhythm NSR/ Sinus Tach.

## 2017-06-04 NOTE — PROGRESS NOTES
[address] SHIFT NURSING NOTE    Bedside shift change report given to RN (oncoming nurse) by Veronica Garcia  (offgoing nurse). Report included the following information SBAR, Kardex, Recent Results, Med Rec Status and Cardiac Rhythm NSR.    SHIFT SUMMARY: Comfortable throughout shift - using bedside commode with 1 x assist.         Admission Date 6/1/2017   Admission Diagnosis Acute bronchitis  Acute respiratory failure with hypoxia (HCC)  CKD (chronic kidney disease) stage 3, GFR 61-16 ml/min  Systolic CHF, chronic ([facility]   Consults None        Consults   [x] PT   [x] OT   [] Speech   [] Palliative      [] Hospice    [x] Case Management   [] None   Cardiac Monitoring   [x] Yes   [] No     Antibiotics   [x] Yes   [] No   GI Prophylaxis  (Ex: Protonix, Pepcid, etc,.)   [] Yes   [x] No          DVT Prophylaxis   SCDs:             Inderjit stockings:         [x] Medication (Ex: Lovenox, Eliquis, Brilinta, Coumadin,  Heparin, etc..)   [] Contraindicated   [] No VTE needed       Urinary Catheter             LDAs               Peripheral IV 06/01/17 Right Antecubital (Active)   Site Assessment Clean, dry, & intact 6/3/2017  7:24 PM   Phlebitis Assessment 0 6/3/2017  7:24 PM   Infiltration Assessment 0 6/3/2017  7:24 PM   Dressing Status Clean, dry, & intact 6/3/2017  7:24 PM   Dressing Type Transparent 6/3/2017  7:24 PM   Hub Color/Line Status Pink; Infusing 6/3/2017  7:24 PM   Action Taken Blood drawn 6/1/2017  2:13 PM       Peripheral IV 06/01/17 Left Hand (Active)   Site Assessment Clean, dry, & intact 6/3/2017  7:24 PM   Phlebitis Assessment 0 6/3/2017  7:24 PM   Infiltration Assessment 0 6/3/2017  7:24 PM   Dressing Status Clean, dry, & intact 6/3/2017  7:24 PM   Dressing Type Transparent 6/3/2017  7:24 PM   Hub Color/Line Status Yellow; Flushed 6/3/2017  7:24 PM                      I/Os   Intake/Output Summary (Last 24 hours) at 06/03/17 2037  Last data filed at 06/03/17 1924   Gross per 24 hour   Intake              370 ml Output              900 ml   Net             -530 ml         Activity Level Activity Level: Up with Assistance     Activity Assistance: Partial (one person)   Diet Active Orders   Diet    DIET CARDIAC Regular; 2 GM NA (House Low NA); Low Potassium      Purposeful Rounding every 1-2 hour? [x] Yes    Mariajose Score  Total Score: 3   Bed Alarm (If score 3 or >)   [x] Yes    [] Refused (See signed refusal form in chart)   Elton Score  Elton Score: 18       Elton Score (if score 14 or less)   [] PMT consult   [] Nutrition consult   [] Wound Care consult      []  Specialty bed         Influenza Vaccine Received Flu Vaccine for Current Season (usually Sept-March): Not Flu Season               Needs prior to discharge:   Home O2 required:    [x] Yes   [] No     If yes, how much O2 required?      Other:    Last Bowel Movement Date: 06/03/17   Readmission Risk Assessment Tool Score High Risk            28       Total Score        4 More than 1 Admission in calendar year    5 Patient Insurance is Medicare, Medicaid or Self Pay    19 Charlson Comorbidity Score        Criteria that do not apply:    Relationship with PCP    Patient Living Status    Patient Length of Stay > 5       Expected Length of Stay 3d 19h   Actual Length of Stay 2

## 2017-06-04 NOTE — PROGRESS NOTES
Bedside shift change report given to Yahir Chau (oncoming nurse) by Evi Avendano RN (offgoing nurse). Report included the following information SBAR.

## 2017-06-04 NOTE — PROGRESS NOTES
Hospitalist Progress Note    NAME: Elly Connelly   :  1927   MRN:  [id number]       Interim Hospital Summary: 80 y.o. female whom presented on 2017 with      Assessment / Plan:  Acute Resp failure with Hypoxia POA  Acute bronchitis POA +/_ acute on chronic HF   Clinically:very slowly improving    Continue current aggressive management with Iv steroids/empiric AB  Continue with diureses, will inc dose of lasix   empiric CTX to Zithromax  O2 to keep indira >90%, wean as tolerated, assess for home O2 on DC  Pt asking to have nebulizer at home      Acute on Chronic systolic dysfunction EF 76%  HTN/CAD  Continue diurese with IV Lasix, negative balance  But Wt 154--> 157 --> inc lasix   Closely monitor fluid balance   Cont other home meds including BP meds, Nitro patch    CKD stage III  Cr at baseline ( 2.2), monitor closely. Avoid nephrotoxic drugs, adjust all meds to GFR.     Gouty arthritis POA. Continue uloric 80 mg daily or equivalent         Code Status: DNR   Surrogate Decision Maker: daughter Alem Brooks  # [phone]  DVT Prophylaxis: Heparin SQ    Baseline: her youngest son Chung Brito living with her , uses walker at baseline, independent with ADLs as per pt- confirmed by son at bedside  Recommended Disposition: hopefully home 2-3 days      Subjective:     Chief Complaint / Reason for Physician Visit: acute respiratory failure / bronchitis   Dyspnea:very slowly improving   Feeling weak/coughing     Discussed with RN events overnight. Review of Systems:  Symptom Y/N Comments  Symptom Y/N Comments   Fever/Chills n   Chest Pain n    Poor Appetite    Edema     Cough y   Abdominal Pain n    Sputum    Joint Pain     SOB/DENSON y   Pruritis/Rash     Nausea/vomit n   Tolerating PT/OT y None    Diarrhea n   Tolerating Diet y    Constipation n   Other       Could NOT obtain due to:      Objective:     VITALS:   Last 24hrs VS reviewed since prior progress note.  Most recent are:  Patient Vitals for the past 24 hrs:   Temp Pulse Resp BP SpO2   06/04/17 1124 97.8 °F (36.6 °C) 96 22 (!) 138/111 98 %   06/04/17 0738 - - - - 98 %   06/04/17 0727 97.5 °F (36.4 °C) 84 22 153/61 94 %   06/04/17 0401 - 80 20 - -   06/04/17 0336 98 °F (36.7 °C) (!) 101 22 140/84 95 %   06/04/17 0106 - - - - 97 %   06/03/17 2242 97.6 °F (36.4 °C) 88 20 138/56 99 %   06/03/17 1924 97.8 °F (36.6 °C) 92 20 121/51 99 %   06/03/17 1910 - - - - 98 %   06/03/17 1513 97.3 °F (36.3 °C) 95 20 141/57 98 %       Intake/Output Summary (Last 24 hours) at 06/04/17 1417  Last data filed at 06/04/17 0650   Gross per 24 hour   Intake              120 ml   Output              850 ml   Net             -730 ml        PHYSICAL EXAM:  General: WD, WN. Alert, cooperative, no acute distress    EENT:  EOMI. Anicteric sclerae. MMM  Resp:  BL rhonchi, no wheezing or rales. No accessory muscle use  CV:  Regular  rhythm,  No edema  GI:  Soft, Non distended, Non tender.  +Bowel sounds  Neurologic:  Alert and oriented X 3, normal speech,   Psych:   Good insight. Not anxious nor agitated  Skin:  No rashes. No jaundice    Reviewed most current lab test results and cultures  YES  Reviewed most current radiology test results   YES  Review and summation of old records today    NO  Reviewed patient's current orders and MAR    YES  PMH/SH reviewed - no change compared to H&P  ________________________________________________________________________  Care Plan discussed with:    Comments   Patient y    Family      RN y    Care Manager     Consultant                        Multidiciplinary team rounds were held today with , nursing, pharmacist and clinical coordinator. Patient's plan of care was discussed; medications were reviewed and discharge planning was addressed.      ________________________________________________________________________  Total NON critical care TIME:  35   Minutes    Total CRITICAL CARE TIME Spent:   Minutes non procedure based      Comments >50% of visit spent in counseling and coordination of care     ________________________________________________________________________  Cesilia Batista MD     Procedures: see electronic medical records for all procedures/Xrays and details which were not copied into this note but were reviewed prior to creation of Plan. LABS:  I reviewed today's most current labs and imaging studies.   Pertinent labs include:  Recent Labs      06/02/17 0426   WBC  8.3   HGB  10.0*   HCT  31.6*   PLT  151     Recent Labs      06/02/17 0426   NA  139   K  4.3   CL  106   CO2  26   GLU  165*   BUN  62*   CREA  2.00*   CA  8.6   MG  2.6*       Signed: Cesilia Batista MD

## 2017-06-05 NOTE — PROGRESS NOTES
Bedside shift change report given to Jodee Hill (oncoming nurse) by Elfego Escobedo (offgoing nurse). Report included the following information SBAR.

## 2017-06-05 NOTE — PROGRESS NOTES
Bedside and Verbal shift change report given to Jodee Hill (oncoming nurse) by Mahi Desai RN (offgoing nurse).  Report included the following information SBAR, Kardex, Intake/Output, MAR, Recent Results and Cardiac Rhythm NSR. '

## 2017-06-05 NOTE — PROGRESS NOTES
Problem: Self Care Deficits Care Plan (Adult)  Goal: *Acute Goals and Plan of Care (Insert Text)  Occupational Therapy Goals  Initiated 6/5/2017  1. Patient will perform lower body dressing with modified independence within 7 day(s). 2. Patient will perform simple home management with modified independence using her RW within 7 day(s). 3. Patient will perform toilet transfers with modified independence using RW within 7 day(s). 4. Patient will perform all aspects of toileting with modified independence within 7 day(s). 5. Patient will participate in upper extremity therapeutic exercise/activities with independence for 10 minutes within 7 day(s). 6. Patient will utilize energy conservation techniques during functional activities with verbal and visual cues within 7 day(s). OCCUPATIONAL THERAPY EVALUATION  Patient: Sarabjit Lorenzana (34 y.o. female)  Date: 6/5/2017  Primary Diagnosis: Acute bronchitis  Acute respiratory failure with hypoxia (HCC)  CKD (chronic kidney disease) stage 3, GFR 71-50 ml/min  Systolic CHF, chronic (HCC)        Precautions:  Fall, DNR, Bed Alarm, Skin      ASSESSMENT :  Based on the objective data described below, the patient presents with decreased cognition, strength, endurance, mobility, and safety following admission for acute bronchitis. Pt has a h/o of CVAs with impaired cognition per her son. Pt currently requires up to min A for LE ADLs, CGA for toileting and CGA for functional mobility using RW to amb. Prior to hospitalization pt was mod I with ADLs and light household IADLs. She does not drive. Do not anticipate any OT needs after discharge. Patient will benefit from skilled intervention to address the above impairments.   Patients rehabilitation potential is considered to be Guarded  Factors which may influence rehabilitation potential include:   [ ]             None noted  [X]             Mental ability/status  [X]             Medical condition  [ ] Home/family situation and support systems  [ ]             Safety awareness  [ ]             Pain tolerance/management  [ ]             Other:        PLAN :  Recommendations and Planned Interventions:  [X]               Self Care Training                  [X]        Therapeutic Activities  [X]               Functional Mobility Training    [X]        Cognitive Retraining  [X]               Therapeutic Exercises           [X]        Endurance Activities  [X]               Balance Training                   [ ]        Neuromuscular Re-Education  [ ]               Visual/Perceptual Training     [X]   Home Safety Training  [X]               Patient Education                 [X]        Family Training/Education  [ ]               Other (comment):     Frequency/Duration: Patient will be followed by occupational therapy 3 times a week to address goals. Discharge Recommendations: None for OT  Further Equipment Recommendations for Discharge: TBD       SUBJECTIVE:   Patient stated I just got back to bed.       OBJECTIVE DATA SUMMARY:   HISTORY:   Past Medical History:   Diagnosis Date    CAD (coronary artery disease)      Chronic kidney disease      Glucose intolerance (impaired glucose tolerance)      Gout      Hypercholesterolemia      Hypertension      MI (myocardial infarction) ([facility] [address] 1980's     s/p angioplasty    Spondylosis      Vitamin D deficiency       Past Surgical History:   Procedure Laterality Date    CARDIAC SURG PROCEDURE UNLIST         cardiac stent    HX ANGIOPLASTY   1980s    HX CORONARY STENT PLACEMENT            Prior Level of Function/Home Situation: pt was mod I with ADLs and light household IADLs, amb with rollator. She does not drive.   Expanded or extensive additional review of patient history: obtained from pt and her son  [address]: Private residence  # Steps to Enter: 0  Wheelchair Ramp: Yes  One/Two Story Residence: One story  Living Alone: No  Support Systems: Child(tamera), Family member(s)  Patient Expects to be Discharged to[de-identified] Private residence  Current DME Used/Available at Home: Zachary Zazueta, len[place], [address] chair  Tub or Shower Type: Shower  [X]  Right hand dominant             [ ]  Left hand dominant     EXAMINATION OF PERFORMANCE DEFICITS:  Cognitive/Behavioral Status:  Neurologic State: Alert  Orientation Level: Oriented to person;Oriented to place; Disoriented to situation;Disoriented to time  Cognition: Decreased attention/concentration; Follows commands;Poor safety awareness  Perception: Appears intact  Perseveration: No perseveration noted  Safety/Judgement: Awareness of environment;Decreased awareness of need for safety;Decreased insight into deficits; Fall prevention     Hearing: Auditory  Auditory Impairment: None     Vision/Perceptual:    Acuity: Able to read clock/calendar on wall without difficulty          Range of Motion:  AROM: Generally decreased, functional  PROM: Generally decreased, functional                       Strength:  Strength: Generally decreased, functional                 Coordination:  Coordination: Generally decreased, functional  Fine Motor Skills-Upper: Left Intact; Right Intact    Gross Motor Skills-Upper: Left Intact; Right Intact     Tone & Sensation:  Tone: Normal                          Balance:  Sitting: Intact  Standing: Intact; With support (RW)     Functional Mobility and Transfers for ADLs:  Bed Mobility:  Supine to Sit: Minimum assistance; Additional time;Assist x1 (A for upper body)  Sit to Supine: Minimum assistance; Additional time;Assist x1 (A for LEs)  Scooting: Contact guard assistance;Assist x1;Additional time     Transfers:  Sit to Stand: Contact guard assistance; Additional time;Assist x1 (HHA)  Toilet Transfer : Contact guard assistance; Additional time;Assist x1 (using RW- A for safety, slow pace)     ADL Assessment:  Feeding: Modified independent; Additional time     Oral Facial Hygiene/Grooming: Stand-by assistance; Additional time (standing at sink using RW)     Bathing: Minimum assistance; Additional time;Assist x1 (A to reach R foot and safety with standing)     Upper Body Dressing: Setup     Lower Body Dressing: Minimum assistance; Additional time;Assist x1 (A to reach R foot and safety with standing)     Toileting: Additional time;Assist x1;Contact guard assistance (A with standing)                 ADL Intervention and task modifications:  Patient was educated on the benefits of maintaining activity tolerance, functional mobility, and independence with self care tasks during acute stay. Encouraged patient to be out of bed for all meals, perform daily ADLs (as approved by RN/MD regarding bathing etc), performing functional mobility to/from bathroom, and increasing time OOB daily with assist. Patient educated about the importance of maintaining activity tolerance to ensure safe return home and to baseline. Patient verbalized understanding of education. Cognitive Retraining  Safety/Judgement: Awareness of environment;Decreased awareness of need for safety;Decreased insight into deficits; Fall prevention        Functional Measure:  Barthel Index:      Bathin  Bladder: 5  Bowels: 10  Groomin  Dressin  Feeding: 10  Mobility: 0  Stairs: 0  Toilet Use: 5  Transfer (Bed to Chair and Back): 10  Total: 45         Barthel and G-code impairment scale:  Percentage of impairment CH  0% CI  1-19% CJ  20-39% CK  40-59% CL  60-79% CM  80-99% CN  100%   Barthel Score 0-100 100 99-80 79-60 59-40 20-39 1-19    0   Barthel Score 0-20 20 17-19 13-16 9-12 5-8 1-4 0      The Barthel ADL Index: Guidelines  1. The index should be used as a record of what a patient does, not as a record of what a patient could do. 2. The main aim is to establish degree of independence from any help, physical or verbal, however minor and for whatever reason. 3. The need for supervision renders the patient not independent.   4. A patient's performance should be established using the best available evidence. Asking the patient, friends/relatives and nurses are the usual sources, but direct observation and common sense are also important. However direct testing is not needed. 5. Usually the patient's performance over the preceding 24-48 hours is important, but occasionally longer periods will be relevant. 6. Middle categories imply that the patient supplies over 50 per cent of the effort. 7. Use of aids to be independent is allowed. Giorgio Brooks., Barthel, D.W. (6478). Functional evaluation: the Barthel Index. [facility] (14)2. Rennie Goltz moustapha ADELSO Reynoso, Parvin Vu., Hermila Leary., [place], [address] (1999). Measuring the change indisability after inpatient rehabilitation; comparison of the responsiveness of the Barthel Index and Functional [place] Measure. Journal of Neurology, Neurosurgery, and Psychiatry, 66(4), 881-738. Michelle Pollard, N.J.A, LUIS Zhang, & Abigail Marroquin M.A. (2004.) Assessment of post-stroke quality of life in cost-effectiveness studies: The usefulness of the Barthel Index and the EuroQoL-5D. Quality of Life Research, 13, 264-76         G codes: In compliance with CMSs Claims Based Outcome Reporting, the following G-code set was chosen for this patient based on their primary functional limitation being treated: The outcome measure chosen to determine the severity of the functional limitation was the Barthel Index with a score of 45/100 which was correlated with the impairment scale.       · Self Care:               - CURRENT STATUS:    CK - 40%-59% impaired, limited or restricted               - GOAL STATUS:           CJ - 20%-39% impaired, limited or restricted               - D/C STATUS:                       ---------------To be determined---------------      Occupational Therapy Evaluation Charge Determination   History Examination Decision-Making   MEDIUM Complexity : Expanded review of history including physical, cognitive and psychosocial  history  MEDIUM Complexity : 3-5 performance deficits relating to physical, cognitive , or psychosocial skils that result in activity limitations and / or participation restrictions LOW Complexity : No comorbidities that affect functional and no verbal or physical assistance needed to complete eval tasks       Based on the above components, the patient evaluation is determined to be of the following complexity level: LOW   Pain:  Pain Scale 1: Numeric (0 - 10)  Pain Intensity 1: 0              Activity Tolerance:   Fair  Please refer to the flowsheet for vital signs taken during this treatment. After treatment:   [ ] Patient left in no apparent distress sitting up in chair  [X] Patient left in no apparent distress in bed  [X] Call bell left within reach  [X] Nursing notified  [X] Caregiver present - pt's son  [X] Bed alarm activated      COMMUNICATION/EDUCATION:   The patients plan of care was discussed with: Registered Nurse.  [X] Home safety education was provided and the patient/caregiver indicated understanding. [X] Patient/family have participated as able in goal setting and plan of care. [X] Patient/family agree to work toward stated goals and plan of care. [ ] Patient understands intent and goals of therapy, but is neutral about his/her participation. [ ] Patient is unable to participate in goal setting and plan of care. This patients plan of care is appropriate for delegation to [facility].      Thank you for this referral.  Mago Nelson OT  Time Calculation: 13 mins

## 2017-06-05 NOTE — PROGRESS NOTES
Hospitalist Progress Note    NAME: Jacob Perkins   :  1927   MRN:  [id number]       Interim Hospital Summary: 80 y.o. female whom presented on 2017 with      Assessment / Plan:  Acute Resp failure with Hypoxia POA  Acute bronchitis POA +/_ acute on chronic HF   Clinically:slowly improving - likely will be ready for dc in 1 -2 days   Steroids: tapering down and changing to PO    empiric AB day 5  Diurese as below   empiric CTX to Zithromax  Follow PT recommendations   O2 to keep indira >90%, wean as tolerated, assess for home O2 on DC  Pt asking to have nebulizer at home      Acute on Chronic systolic dysfunction EF 84%  HTN/CAD  Clinically improving   Diurese: slowing down diureses, will change lasix to PO - 40 mg today, restart home dose 20 mg in am - reassess daily    Wt 154--> 157 --> 154   Closely monitor fluid balance   Cont other home meds including BP meds    CKD stage III  Cr at baseline ( 2.2), monitor closely. Avoid nephrotoxic drugs, adjust all meds to GFR.     Gouty arthritis POA. Continue uloric 80 mg daily or equivalent         Code Status: DNR   Surrogate Decision Maker: daughter Liane Hussein  # [phone]  DVT Prophylaxis: Heparin SQ    Baseline: her youngest son Moiz Becerril living with her , uses walker at baseline, independent with ADLs as per pt- confirmed by son at bedside  Recommended Disposition: hopefully home 1-2 days; assess for home O2 on DC; need nebulizer       Subjective:     Chief Complaint / Reason for Physician Visit: acute respiratory failure / bronchitis   Dyspnea:very slowly improving   Reports feeling better   Son at bedside, agreed that pt is looking better      Discussed with RN events overnight.      Review of Systems:  Symptom Y/N Comments  Symptom Y/N Comments   Fever/Chills n   Chest Pain n    Poor Appetite    Edema     Cough y   Abdominal Pain n    Sputum    Joint Pain     SOB/DENSON y Improving   Pruritis/Rash     Nausea/vomit n   Tolerating PT/OT y None Diarrhea n   Tolerating Diet y    Constipation n   Other       Could NOT obtain due to:      Objective:     VITALS:   Last 24hrs VS reviewed since prior progress note. Most recent are:  Patient Vitals for the past 24 hrs:   Temp Pulse Resp BP SpO2   06/05/17 1445 98 °F (36.7 °C) 90 20 140/61 98 %   06/05/17 1406 - - - - 98 %   06/05/17 1116 97.2 °F (36.2 °C) 85 22 141/61 99 %   06/05/17 0812 - - - - 100 %   06/05/17 0731 97.3 °F (36.3 °C) 66 20 165/57 100 %   06/05/17 0335 98.2 °F (36.8 °C) 95 20 151/87 96 %   06/05/17 0104 - - - - 95 %   06/04/17 2256 97.9 °F (36.6 °C) 91 21 140/55 100 %   06/04/17 1915 98 °F (36.7 °C) 91 21 143/62 99 %   06/04/17 1913 - - - - 97 %       Intake/Output Summary (Last 24 hours) at 06/05/17 1518  Last data filed at 06/05/17 0550   Gross per 24 hour   Intake              650 ml   Output              975 ml   Net             -325 ml        PHYSICAL EXAM:  General: WD, WN. Alert, cooperative, no acute distress    EENT:  EOMI. Anicteric sclerae. MMM  Resp:  Sound better, bronchial sound, occasional wheezing, no rhonchi, no rales. No accessory muscle use  CV:  Regular  rhythm,  No edema  GI:  Soft, Non distended, Non tender.  +Bowel sounds  Neurologic:  Alert and oriented X 3, normal speech,   Psych:   Good insight. Not anxious nor agitated  Skin:  No rashes. No jaundice    Reviewed most current lab test results and cultures  YES  Reviewed most current radiology test results   YES  Review and summation of old records today    NO  Reviewed patient's current orders and MAR    YES  PMH/SH reviewed - no change compared to H&P  ________________________________________________________________________  Care Plan discussed with:    Comments   Patient y    Family  y Son bedside    RN y    Care Manager y    Consultant                        Multidiciplinary team rounds were held today with , nursing, pharmacist and clinical coordinator.   Patient's plan of care was discussed; medications were reviewed and discharge planning was addressed. ________________________________________________________________________  Total NON critical care TIME:  35   Minutes    Total CRITICAL CARE TIME Spent:   Minutes non procedure based      Comments   >50% of visit spent in counseling and coordination of care     ________________________________________________________________________  Cesilia Batista MD     Procedures: see electronic medical records for all procedures/Xrays and details which were not copied into this note but were reviewed prior to creation of Plan. LABS:  I reviewed today's most current labs and imaging studies.   Pertinent labs include:  Recent Labs      06/05/17   0105   WBC  5.4   HGB  9.9*   HCT  30.8*   PLT  167     Recent Labs      06/05/17   0105   NA  145   K  3.6   CL  108   CO2  29   GLU  229*   BUN  86*   CREA  2.33*   CA  8.4*       Signed: Cesilia Batista MD

## 2017-06-05 NOTE — PROGRESS NOTES
Bedside and Verbal shift change report given to Emma Otto RN (oncoming nurse) by Aury Foley RN (offgoing nurse). Report included the following information SBAR, Kardex, Intake/Output, MAR, Recent Results and Cardiac Rhythm NSR/ Sinus Tach.

## 2017-06-05 NOTE — PROGRESS NOTES
Have now sent order for nebulizer to [facility] as [facility] has not responded and [facility] is unable to provide DME under Medicare. Reviewed the chart and therapy is not recommending any home care services. Discharge is anticipated in next 1-2 days.

## 2017-06-05 NOTE — CARDIO/PULMONARY
Cardiopulmonary Rehab Nursing Entry:     Chart reviewed. Pt 79 yo admitted with acute bronchitis, chronic systolic dysfunction EF 07%, PMHx of HTN, CAD. Received CHF instruction during 6/2016 admission. This was a follow-up visit to answer questions and reinforce prior teaching re: CHF, S&Ss, medication management, Low NA diet, daily weights, when to call the doctor and balancing rest/activity. Met with patient and son who is at bedside. They have folder at bedside and have no questions currently. Patient states she watches her salt intake and weights at home. Will continue to follow.

## 2017-06-06 NOTE — DISCHARGE SUMMARY
Hospitalist Discharge Summary     Patient ID:  Will Pollard  [id number]  83 y.o.  5/31/1927    PCP on record: Zehra Kern MD    Admit date: 6/1/2017  Discharge date and time: 6/6/2017      DISCHARGE DIAGNOSIS:    Respiratory Failure, Bronchitis, CHF, HTN, CAD, CKD, Gout      CONSULTATIONS:  None    Excerpted HPI from H&P of Camilo Proctor MD:  Yesi Marley is a 80 y.o.  female who presents with above complains from home via EMS. Pt presents with CC of worsening SOB x few days after recently seen in ED over the last weekend- pt had declined admission then. H/o associated cough with sputum since last 24 hrs  Denies high fever, chest pain, palpitations, weight gain  Pt was DC home from ER over weekend on PO prednisone with no PO Abx  Pt was found to have a neg CXR again tonight in ER but was found to have Mild hypoxia in ER at rest requiring Oxygen support to keep sats >90%  We were asked to admit for work up and evaluation of the above problems. ______________________________________________________________________  DISCHARGE SUMMARY/HOSPITAL COURSE:  for full details see H&P, daily progress notes, labs, consult notes. Acute Resp failure with Hypoxia POA  So far resolved, tolerating room air. Acute bronchitis POA +/_ acute on chronic HF   Clinically:slowly improving   Steroids: tapering down and changing to PO   empiric AB day 5  Diurese as below   empiric CTX to Zithromax, will D/c on Ceftin for 7 days  Follow PT recommendations   Acute on Chronic systolic dysfunction EF 20%  HTN/CAD  Clinically improving   Diurese: slowing down diureses, will change lasix to PO - 40 mg today, restart home dose 20 mg in am - reassess daily   Wt 154--> 157 --> 154   Closely monitor fluid balance   Cont other home meds including BP meds  CKD stage III  Cr at baseline ( 2.2), monitor closely. Avoid nephrotoxic drugs, adjust all meds to GFR. Gouty arthritis POA.  Continue uloric 80 mg daily or equivalent  Code Status: DNR   Surrogate Decision Maker: pradeep Shaw # [phone]  DVT Prophylaxis: Heparin SQ  Baseline: her youngest son Natanael Elder living with her , uses walker at baseline, independent with ADLs as per pt- confirmed by son at bedside  Recommended Disposition: hopefully home     D/c Home and F/U with PCP and Cardiology as outpatient  _______________________________________________________________________  Patient seen and examined by me on discharge day. Pertinent Findings:  Gen:    Not in distress  Chest: Coarse BS, rhonchi  CVS:   Regular rhythm. trace edema  Abd:  Soft, not distended, not tender  Neuro:  Alert, GCS 15  _______________________________________________________________________  DISCHARGE MEDICATIONS:   Current Discharge Medication List      START taking these medications    Details   guaiFENesin-dextromethorphan (ROBITUSSIN DM) 100-10 mg/5 mL syrup Take 10 mL by mouth every six (6) hours as needed for up to 10 days. Qty: 473 mL, Refills: 0      cefUROXime (CEFTIN) 250 mg tablet Take 1 Tab by mouth two (2) times a day for 7 days. Qty: 14 Tab, Refills: 0         CONTINUE these medications which have CHANGED    Details   furosemide (LASIX) 20 mg tablet Take 1 Tab by mouth daily. Qty: 30 Tab, Refills: 1      predniSONE (DELTASONE) 10 mg tablet Take 1 Tab by mouth Multiple. Take 4 tab po daily for 3 days then  Take 2 tab po daily for 3 days  Then take 1 tab po daily for 3 days  Then take 1/2 tab po daily for 3 days then stop  Qty: 24 Tab, Refills: 0      acetaminophen (TYLENOL) 325 mg tablet Take 2 Tabs by mouth every six (6) hours as needed for Pain or Fever. Qty: 40 Tab, Refills: 0         CONTINUE these medications which have NOT CHANGED    Details   ULORIC 80 mg tab tablet TAKE 1 TABLET DAILY  Qty: 90 Tab, Refills: 1      diphenhydrAMINE (BENADRYL) 25 mg capsule Take 25 mg by mouth every six (6) hours as needed.       multivit-min-FA-lycopen-lutein 0.4-300-250 mg-mcg-mcg tab Take  by mouth. ferrous sulfate 325 mg (65 mg iron) cpER Take 1 Tab by mouth daily. nitroglycerin (NITRODUR) 0.4 mg/hr 1 Patch by TransDERmal route daily. metoprolol succinate (TOPROL-XL) 25 mg XL tablet Take 12.5 mg by mouth daily. atorvastatin (LIPITOR) 20 mg tablet Take 1 Tab by mouth daily. Qty: 90 Tab, Refills: 3    Associated Diagnoses: Hypercholesterolemia      BIDIL 20-37.5 mg per tablet TAKE ONE TABLET BY MOUTH THREE TIMES DAILY  Qty: 90 Tab, Refills: 12      aspirin 81 mg tablet Take 81 mg by mouth. STOP taking these medications       guaiFENesin ER (MUCINEX) 600 mg ER tablet Comments:   Reason for Stopping:               My Recommended Diet, Activity, Wound Care, and follow-up labs are listed in the patient's Discharge Insturctions which I have personally completed and reviewed.     _______________________________________________________________________  DISPOSITION:    Home with Family: y   Home with HH/PT/OT/RN:    SNF/LTC:    JUSTO:    OTHER:        Condition at Discharge:  Stable  _______________________________________________________________________  Follow up with:   PCP : Zeynep Hernadez MD  Follow-up Information     Follow up With Details [address], [address] [facility] [address]  [phone]        F/U PCP  F/U Cardiology        Total time in minutes spent coordinating this discharge (includes going over instructions, follow-up, prescriptions, and preparing report for sign off to her PCP) :  35 minutes    Signed:  Srinivas Qureshi MD

## 2017-06-06 NOTE — TELEPHONE ENCOUNTER
Son advised that Medicare will not cover a nebulizer for acute bronchitis and the prednisone should be all she needs. Informed nurse navigator will call tomorrow to follow up and appointment has been given for Monday, June 12, 2017 04:00 PM for hospital follow up. Son will call if he has any more concerns.

## 2017-06-06 NOTE — TELEPHONE ENCOUNTER
She was not discharged with albuterol or nebulizer. Is taking a tapering course of prednisone, which should be sufficient. In any case Medicare will not cover nebulizer for acute bronchitis.

## 2017-06-06 NOTE — DISCHARGE INSTRUCTIONS
HOSPITALIST DISCHARGE INSTRUCTIONS  NAME: Dafne Wallace   :  1927   MRN:  [id number]     Date/Time:  2017 8:09 AM    ADMIT DATE: 2017     DISCHARGE DATE: 2017     DIAGNOSIS:  Respiratory Failure, Bronchitis, CHF, HTN, CAD, CKD, Gout      MEDICATIONS:                As per medication reconciliation    · It is important that you take the medication exactly as they are prescribed. · Keep your medication in the bottles provided by the pharmacist and keep a list of the medication names, dosages, and times to be taken in your wallet. · Do not take other medications without consulting your doctor. Pain Management: per above medications    What to do at Home    Recommended diet:  Cardiac Diet    Recommended activity: Activity as tolerated    If you experience any of the following symptoms then please call your primary care physician or return to the emergency room if you cannot get hold of your doctor:  Fever, chills, nausea, vomiting, diarrhea, change in mentation, falling, bleeding, shortness of breath. CHF specific discharge instructions    Weight:  · Daily weights, Notify your Doctor if Wt gain of 3 lb in a day or 5 lb in a week  · Daily Intake & Output        Diet :  · Low salt cardiac diet   · Fluid restriction of 1500 ml daily              Follow Up:   PCP you are to call and set up an appointment to see them in 2 week. F/U Cardiology      Information obtained by :  I understand that if any problems occur once I am at home I am to contact my physician. I understand and acknowledge receipt of the instructions indicated above.                                                                                                                                            Physician's or R.N.'s Signature                                                                  Date/Time Patient or Representative Signature                                                          Date/Time

## 2017-06-06 NOTE — PROGRESS NOTES
Patient is scheduled for discharge and son Elita Sandifer is transporting patient home. The family requested a nebulizer and this writer was attempting to get Medicare to cover cost of DME. When son realized patient was being discharged on any nebulizer medications, son canceled request for DME and said he will just discuss need in PCP appointment. PCP office to call patient with date and time of appointment. No home care needs identified. Care Management Interventions  PCP Verified by CM:  Yes  Discharge Durable Medical Equipment: No (owns a rolling walker and BSC)  Physical Therapy Consult: Yes  Occupational Therapy Consult: Yes  Speech Therapy Consult: No  Current Support Network: Own Home (Lives with her son who does shopping and IADl's)  Confirm Follow Up Transport: Family  Plan discussed with Pt/Family/Caregiver: Yes  Freedom of Choice Offered: Yes  Discharge Location  Discharge Placement: Home

## 2017-06-06 NOTE — PROGRESS NOTES
Bedside and Verbal shift change report given to Scottie Murcia RN (oncoming nurse) by Caryn Leyden, RN (offgoing nurse). Report included the following information SBAR, Kardex, Intake/Output, MAR, Recent Results and Cardiac Rhythm NSR.

## 2017-06-06 NOTE — TELEPHONE ENCOUNTER
Son states that pt was on a nebulizer while in the hospital, was discharged today (6/6/17 from [facility]) and they didn't further advise them of any breathing treatments; he'd like to know if pt needs a nebulizer or not  Further states may reach him on cell [phone] if no answer at home phone

## 2017-06-06 NOTE — PROGRESS NOTES
Discharge instructions reviewed with patient including follow up appointments, medications and side effects. Patient and family given opportunity for questions. Verbalized understanding of information. PIV and telemetry removed. Patient stable for discharge with family at this time.

## 2017-06-07 NOTE — PROGRESS NOTES
Hospital Discharge Follow-Up      Date/Time:  2017 1:57 PM    Patient listed on discharge [facility]) report on 17. RRAT score: High Risk            23       Total Score        4 More than 1 Admission in calendar year    19 Charlson Comorbidity Score        Criteria that do not apply:    Relationship with PCP    Patient Living Status    Patient Length of Stay > 5    Patient Insurance is Medicare, Medicaid or Self Pay        Brief hospitalization history:  Patient was admitted to [facility] on 17 and discharged on 17 for acute bronchitis, acute resp failure. Patient went to ED on 17 for productive cough and left sided chest pain. Diagnosed with Acute upper resp infection. Treated with Mucinex and prednisone and sent home. Patient came back to ED with worsening SOB and productive cough and was admitted. Treated with breathing treatments, antibiotics, prednisone, and oxygen support. CXR negative. Patient also diuresed. Nurse Navigator(NN) contacted the patient by telephone to perform post hospital discharge assessment. Verified  and address with patient as identifiers. Provided introduction to self, and explanation of the Nurses Navigator role. The patient states that she is not having any SOB. She still has a cough, but not producing any sputum. She says that she is still taking the antibiotics. NN discussed importance of completing the full course of antibiotics as prescribed. Patient states she is drinking plenty of fluids and is eating good. Swelling has gone down to its normal state. Patient does have an appointment with Dr. Jessi Santiago, but she does not know when it is. Patient given an opportunity to ask questions and they have no further questions or concerns at this time. No other clinical/social/functional needs noted. The patient agrees to contact the PCP office for questions related to their healthcare.  NN provided contact information to the patient for future reference. The patient expressed thanks, offered no additional questions and ended the call. Diet:   Patient reports: AHA diet    Activity:    Patient reports: mostly moving around the house    Medication:   Performed medication reconciliation with patient, and patient verbalizes understanding of administration of home medications. There were no barriers to obtaining medications identified at this time. Support system:  patient and son    Discharge Instructions :  Reviewed discharge instructions with patient. Patient verbalizes understanding of discharge instructions and follow-up care. Red Flags:  BLE swelling, SOB, cough producing yellow/green sputum, fever >100.4  Reviewed red flags with patient, and patient verbalizes understanding. Labs Reviewed:  Recent Labs      06/05/17   0105   WBC  5.4   HGB  9.9*   HCT  30.8*   PLT  167     Recent Labs      06/06/17   0456  06/05/17   0105   NA  144  145   K  3.6  3.6   CL  108  108   CO2  29  29   GLU  127*  229*   BUN  74*  86*   CREA  2.05*  2.33*   CA  8.2*  8.4*       PCP/Specialist follow up: Patient scheduled to follow up with Julia Avendano MD on 6/12/17. Goals      Antibiotic therapy            Patient will complete full course of antibiotics as prescribed       BEN appt            Patient will attend [facility] appt with Dr. Bryanna Anders on 6/12/17.

## 2017-06-12 NOTE — MR AVS SNAPSHOT
Visit Information Date & Time Provider Department Dept. Phone Encounter #  
 6/12/2017  4:00 PM MD Harika Jewell [phone] [id number] Follow-up Instructions Return in about 2 weeks (around 6/26/2017) for CHF, Bronchitis . Your Appointments 6/22/2017  8:45 AM  
LAB with LAB [facility] Harika Coronel [address] [place] [address]) Appt Note: fasting lab; fasting lab  
 [address] [facility] [address] [phone]  
  
   
 [facility]  
  
    
 6/26/2017  2:30 PM  
SAME DAY with MD Harika Jewell [address] [place] [address]) Appt Note: swelling [address] [facility] [address] [phone]  
  
   
 [address] 7/17/2017  8:15 AM  
ROUTINE CARE with MD Harika Jewell [address] [place] [address]) Appt Note: htn, chol, gluc, chf, fasting lab prior/$0CP KMP 1/17/17  
 [address] [facility] [address] [phone]  
  
   
 [facility]  
  
    
 8/3/2017  1:00 PM  
MEETING with NURSE NAVIGATOR Harika Coronel [address] [place] [address]) Appt Note: honoring choices [address] [facility] [address] [phone]  
  
   
 8 [facility] [address] Upcoming Health Maintenance Date Due  
 MEDICARE YEARLY EXAM 5/20/2017 GLAUCOMA SCREENING Q2Y 5/26/2017 DTaP/Tdap/Td series (1 - Tdap) 5/13/2019* INFLUENZA AGE 9 TO ADULT 8/1/2017 *Topic was postponed. The date shown is not the original due date. Allergies as of 6/12/2017  Review Complete On: 6/12/2017 By: Yevgeniy Morales MD  
 No Known Allergies Current Immunizations  Reviewed on 3/20/2017 Name Date Influenza High Dose Vaccine PF 9/16/2016, 10/28/2015, 11/20/2014 Influenza Vaccine 10/9/2013 Influenza Vaccine Split 1/15/2012  5:07 PM  
 Pneumococcal Conjugate (PCV-13) 5/19/2016 Pneumococcal Vaccine (Unspecified Type) 5/13/2009 TD Vaccine 5/13/2009 Not reviewed this visit You Were Diagnosed With   
  
 Codes Comments Medicare annual wellness visit, subsequent    -  Primary ICD-10-CM: Z00.00 ICD-9-CM: V70.0 Chronic systolic congestive heart failure (HCC)     ICD-10-CM: I50.22 ICD-9-CM: 428.22, 428.0 Acute bronchitis, unspecified organism     ICD-10-CM: J20.9 ICD-9-CM: 466.0 Ischemic cardiomyopathy     ICD-10-CM: I25.5 ICD-9-CM: 414.8 Vitals BP Pulse Temp Resp Height(growth percentile) Weight(growth percentile) 124/57 (BP 1 Location: Left arm, BP Patient Position: Sitting) 74 98.5 °F (36.9 °C) (Oral) 14 5' 5\" (1.651 m) 157 lb 8 oz (71.4 kg) SpO2 BMI OB Status Smoking Status 96% 26.21 kg/m2 Postmenopausal Former Smoker BMI and BSA Data Body Mass Index Body Surface Area  
 26.21 kg/m 2 1.81 m 2 Preferred Pharmacy Pharmacy Name [facility] [address] [place], [place] - [address] [place] [address] [phone] Your Updated Medication List  
  
   
This list is accurate as of: 6/12/17  5:07 PM.  Always use your most recent med list.  
  
  
  
  
 acetaminophen 325 mg tablet Commonly known as:  TYLENOL Take 2 Tabs by mouth every six (6) hours as needed for Pain or Fever. albuterol 90 mcg/actuation inhaler Commonly known as:  PROVENTIL HFA, VENTOLIN HFA, PROAIR HFA Take 2 Puffs by inhalation. aspirin 81 mg tablet Take 81 mg by mouth. atorvastatin 20 mg tablet Commonly known as:  LIPITOR Take 1 Tab by mouth daily. BENADRYL 25 mg capsule Generic drug:  diphenhydrAMINE Take 25 mg by mouth every six (6) hours as needed. BIDIL 20-37.5 mg per tablet Generic drug:  isosorbide-hydrALAZINE  
TAKE ONE TABLET BY MOUTH THREE TIMES DAILY cefUROXime 250 mg tablet Commonly known as:  CEFTIN Take 1 Tab by mouth two (2) times a day for 7 days. ferrous sulfate 325 mg (65 mg iron) Cper Take 1 Tab by mouth daily. furosemide 40 mg tablet Commonly known as:  LASIX Take 1 Tab by mouth daily. guaiFENesin-dextromethorphan 100-10 mg/5 mL syrup Commonly known as:  ROBITUSSIN DM Take 10 mL by mouth every six (6) hours as needed for up to 10 days. metoprolol succinate 25 mg XL tablet Commonly known as:  TOPROL-XL Take 12.5 mg by mouth daily. multivit-min-FA-lycopen-lutein 0.4-300-250 mg-mcg-mcg Tab Take  by mouth. nitroglycerin 0.4 mg/hr Commonly known as:  NITRODUR  
1 Patch by TransDERmal route daily. predniSONE 10 mg tablet Commonly known as:  Guadalupe Gayle Take 1 Tab by mouth Multiple. Take 4 tab po daily for 3 days then Take 2 tab po daily for 3 days Then take 1 tab po daily for 3 days Then take 1/2 tab po daily for 3 days then stop ULORIC 80 mg Tab tablet Generic drug:  febuxostat TAKE 1 TABLET DAILY Follow-up Instructions Return in about 2 weeks (around 6/26/2017) for CHF, Bronchitis . Patient Instructions Increase furosemide to 40 mg once a day Weigh daily. Stop Robitussin DM and take Mucinex twice a day. Return in 2 weeks. We will get fasting lab on 6/22. [address] [place] [address], [address]  Phone: [phone] Fax: [phone] [facility] [address] Phone: [phone] [facility] 
[address] [place], [place], [address] Phone: [phone] [facility] [address] Kusum Patel [facility], [address]  Phone: [phone] Fax: [phone] [employer] [address] [place] [address], [address] [phone] The best way to stay healthy is to live a healthy lifestyle. A healthy lifestyle includes regular exercise, eating a well-balanced diet, keeping a healthy weight and not smoking. Regular physical exams and screening tests are another important way to take care of yourself. Preventive exams provided by health care providers can find health problems early when treatment works best and can keep you from getting certain diseases or illnesses. Preventive services include exams, lab tests, screenings, shots, monitoring and information to help you take care of your own health. All people over 65 should have a pneumonia shot. Pneumonia shots are usually only needed once in a lifetime unless your doctor decides differently. In addition to your physical exam, some screening tests are recommended: 
 
All people over 65 should have a yearly flu shot. People over 65 are at medium to high risk for Hepatitis B. Three shots are needed for complete protection. Bone mass measurement (dexa scan) is recommended every two years. Diabetes Mellitus screening is recommended every year. Glaucoma is an eye disease caused by high pressure in the eye. An eye exam is recommended every year. Cardiovascular screening tests that check your cholesterol and other blood fat (lipid) levels are recommended every five years. Colorectal Cancer screening tests help to find pre-cancerous polyps (growths in the colon) so they can be removed before they turn into cancer. Tests ordered for screening depend on your personal and family history risk factors. Prostate Cancer Screening (annually up to age 76) Screening for breast cancer is recommended yearly with a Mammogram. 
 
Screening for cervical and vaginal cancer is recommended with a pelvic and Pap test every two years.  However if you have had an abnormal pap in the past  three years or at high risk for cervical or vaginal cancer Medicare will cover a pap test and a pelvic exam every year. Here is a list of your current Health Maintenance items with a due date: 
Health Maintenance Due Topic Date Due  
 Annual Well Visit  05/20/2017  Glaucoma Screening   05/26/2017 Learning About Heart Failure Zones What are heart failure zones? Heart failure zones give you an easy way to see changes in your heart failure symptoms. They also tell you when you need to get help. Check every day to see which zone you are in. Green zone. You are doing well. This is where you want to be. · Your weight is stable. This means it is not going up or down. · You breathe easily. · You are sleeping well. You are able to lie flat without shortness of breath. · You can do your usual activities. Yellow zone. Be careful. Your symptoms are changing. Call your doctor. · You have new or increased shortness of breath. · You are dizzy or lightheaded, or you feel like you may faint. · You have sudden weight gain, such as 3 pounds or more in 2 to 3 days or 5 pounds in a week. · You have increased swelling in your legs, ankles, or feet. · You are so tired or weak that you cannot do your usual activities. · You are not sleeping well. Shortness of breath wakes you up at night. You need extra pillows. Your doctor's name: ____________________________________________________________ Your doctor's contact information: _________________________________________________ Red zone. This is an emergency. Call 911. You have symptoms of sudden heart failure, such as: 
· You have severe trouble breathing. · You cough up pink, foamy mucus. · You have a new irregular or fast heartbeat. You have symptoms of a heart attack. These may include: · Chest pain or pressure, or a strange feeling in the chest. 
· Sweating. · Shortness of breath. · Nausea or vomiting.  
· Pain, pressure, or a strange feeling in the back, neck, jaw, or upper belly or in one or both shoulders or arms. · Lightheadedness or sudden weakness. · A fast or irregular heartbeat. If you have symptoms of a heart attack: After you call 911, the  may tell you to chew 1 adult-strength or 2 to 4 low-dose aspirin. Wait for an ambulance. Do not try to drive yourself. Follow-up care is a key part of your treatment and safety. Be sure to make and go to all appointments, and call your doctor if you are having problems. It's also a good idea to know your test results and keep a list of the medicines you take. Where can you learn more? Go to http://french-conner.info/. Enter T174 in the search box to learn more about \"Learning About Heart Failure Zones. \" Current as of: January 27, 2016 Content Version: 11.2 © 8383-3330 [employer] Care instructions adapted under license by [employer] (which disclaims liability or warranty for this information). If you have questions about a medical condition or this instruction, always ask your healthcare professional. [address] any warranty or liability for your use of this information. Introducing [facility]! Max Trinidad introduces [employer] patient portal. Now you can access parts of your medical record, email your doctor's office, and request medication refills online. 1. In your internet browser, go to [email or web address] 2. Click on the First Time User? Click Here link in the Sign In box. You will see the New Member Sign Up page. 3. Enter your [employer] Access Code exactly as it appears below. You will not need to use this code after youve completed the sign-up process. If you do not sign up before the expiration date, you must request a new code. · [employer] Access Code: [email or web address] Expires: 8/30/2017  1:16 PM 
 
4.  Enter the last four digits of your Social Security Number (xxxx) and Date of Birth (mm/dd/yyyy) as indicated and click Submit. You will be taken to the next sign-up page. 5. Create a [employer] ID. This will be your [employer] login ID and cannot be changed, so think of one that is secure and easy to remember. 6. Create a [employer] password. You can change your password at any time. 7. Enter your Password Reset Question and Answer. This can be used at a later time if you forget your password. 8. Enter your e-mail address. You will receive e-mail notification when new information is available in [address] 9. Click Sign Up. You can now view and download portions of your medical record. 10. Click the Download Summary menu link to download a portable copy of your medical information. If you have questions, please visit the Frequently Asked Questions section of the [employer] website. Remember, [employer] is NOT to be used for urgent needs. For medical emergencies, dial 911. Now available from your iPhone and Android! Please provide this summary of care documentation to your next provider. Your primary care clinician is listed as Harlan Jarrell. If you have any questions after today's visit, please call [phone].

## 2017-06-12 NOTE — PROGRESS NOTES
HISTORY OF PRESENT ILLNESS  Hien Burch is a 80 y.o. female. She is accompanied by her son  HPI  Ms. Kilo Allen is a 80y.o. year old female, she is seen today for Transition of Care services following a hospital discharge for acute respiratory failure with hypoxia, acute bronchitis and probable acute on chronic heart failure on 6/6/2017. Our office Nurse Navigator performed an outreach to Ms. Castanon Postal on 6/7/2017 (within 2 business days of discharge) to complete medication reconciliation and a telephonic assessment of her condition. The hospital record was reviewed. She was admitted to the hospital on 6/1 with a productive cough, dyspnea, and hypoxia. She had been to the emergency room a few days prior to admission; was treated with prednisone. She did not improve and returns to the emergency room where she was found to have mild hypoxia and admitted. At that time she was noted to have mild wheezing and rales at the bases. Her chest x-ray showed elevated right hemidiaphragm, but otherwise was unremarkable. She was treated with oxygen, tapering steroids, nebulizer treatments, and antibiotic. Initially given ceftriaxone plus azithromycin; this was then changed to Ceftin. She was treated with diuretics and nitroglycerin patch for systolic heart failure. Her EF is 25%  Since she has been home she has been doing fair. Her appetite is not quite back to normal but is slowly improving. She still complains of some cough, wheezing, and dyspnea. She is having a hard time mobilizing mucus.     Patient Active Problem List   Diagnosis Code    Spondylosis M47.9    Benign hypertension with chronic kidney disease, stage III I12.9, N18.3    Vitamin D deficiency E55.9    Prediabetes R73.03    Hypercholesterolemia E78.00    Chronic systolic congestive heart failure (HCC) I50.22    Ischemic cardiomyopathy I25.5    CKD (chronic kidney disease) stage 3, GFR 30-59 ml/min N18.3    Aphasia R47.01    Right sided weakness R53.1    Cerebrovascular disease, arteriosclerotic, post-stroke I67.2, Z86.73    Gout, chronic, with tophus M1A. 9XX1    Osteopenia M85.80    CAD (coronary artery disease), native coronary artery I25.10    Advance directive discussed with patient Z70.80    Acute bronchitis U04.1    Systolic CHF, chronic (HCC) I50.22    Acute respiratory failure with hypoxia ([facility]) J96.01     Past Medical History:   Diagnosis Date    CAD (coronary artery disease)     Chronic kidney disease     Congestive heart failure (HCC)     Glucose intolerance (impaired glucose tolerance)     Gout     Hypercholesterolemia     Hypertension     MI (myocardial infarction) ([facility] [address] 1980's    s/p angioplasty    Spondylosis     Stroke ([facility] [address]     Vitamin D deficiency      Past Surgical History:   Procedure Laterality Date    CARDIAC SURG PROCEDURE UNLIST      cardiac stent    HX ANGIOPLASTY  1980s    HX CORONARY STENT PLACEMENT       Social History     Social History    Marital status:      Spouse name: N/A    Number of children: N/A    Years of education: N/A     Social History Main Topics    Smoking status: Former Smoker     Years: 0.00    Smokeless tobacco: Never Used    Alcohol use No    Drug use: No    Sexual activity: No     Other Topics Concern    None     Social History Narrative     Family History   Problem Relation Age of Onset    Hypertension Mother     Stroke Sister      No Known Allergies  Current Outpatient Prescriptions   Medication Sig Dispense Refill    albuterol (PROVENTIL HFA, VENTOLIN HFA, PROAIR HFA) 90 mcg/actuation inhaler Take 2 Puffs by inhalation.  furosemide (LASIX) 20 mg tablet Take 1 Tab by mouth daily. 30 Tab 1    guaiFENesin-dextromethorphan (ROBITUSSIN DM) 100-10 mg/5 mL syrup Take 10 mL by mouth every six (6) hours as needed for up to 10 days. 473 mL 0    predniSONE (DELTASONE) 10 mg tablet Take 1 Tab by mouth Multiple.  Take 4 tab po daily for 3 days then  Take 2 tab po daily for 3 days  Then take 1 tab po daily for 3 days  Then take 1/2 tab po daily for 3 days then stop 24 Tab 0    acetaminophen (TYLENOL) 325 mg tablet Take 2 Tabs by mouth every six (6) hours as needed for Pain or Fever. 40 Tab 0    cefUROXime (CEFTIN) 250 mg tablet Take 1 Tab by mouth two (2) times a day for 7 days. 14 Tab 0    ULORIC 80 mg tab tablet TAKE 1 TABLET DAILY 90 Tab 1    diphenhydrAMINE (BENADRYL) 25 mg capsule Take 25 mg by mouth every six (6) hours as needed.  multivit-min-FA-lycopen-lutein 0.4-300-250 mg-mcg-mcg tab Take  by mouth.  ferrous sulfate 325 mg (65 mg iron) cpER Take 1 Tab by mouth daily.  nitroglycerin (NITRODUR) 0.4 mg/hr 1 Patch by TransDERmal route daily.  metoprolol succinate (TOPROL-XL) 25 mg XL tablet Take 12.5 mg by mouth daily.  atorvastatin (LIPITOR) 20 mg tablet Take 1 Tab by mouth daily. 90 Tab 3    BIDIL 20-37.5 mg per tablet TAKE ONE TABLET BY MOUTH THREE TIMES DAILY 90 Tab 12    aspirin 81 mg tablet Take 81 mg by mouth. Review of Systems   Constitutional: Positive for malaise/fatigue. Negative for weight loss. Poor appetite   Respiratory: Positive for cough, shortness of breath and wheezing. Negative for sputum production. Cardiovascular: Positive for chest pain (with cough), orthopnea, leg swelling and PND. Negative for palpitations. Gastrointestinal: Negative for constipation and diarrhea. Musculoskeletal: Negative for back pain. Neurological: Negative for dizziness. Visit Vitals    /57 (BP 1 Location: Left arm, BP Patient Position: Sitting)    Pulse 74    Temp 98.5 °F (36.9 °C) (Oral)    Resp 14    Ht 5' 5\" (1.651 m)    Wt 157 lb 8 oz (71.4 kg)    SpO2 96%    BMI 26.21 kg/m2     Physical Exam   Constitutional: She is oriented to person, place, and time. She appears well-developed and well-nourished. HENT:   Head: Normocephalic and atraumatic.    Eyes: Conjunctivae are normal. Pupils are equal, round, and reactive to light. Neck: Neck supple. Carotid bruit is not present. No thyromegaly present. Cardiovascular: Normal rate, regular rhythm and normal heart sounds. PMI is not displaced. Exam reveals no gallop. No murmur heard. Pulses:       Dorsalis pedis pulses are 0 on the right side, and 0 on the left side. Posterior tibial pulses are 0 on the right side, and 0 on the left side. Pulmonary/Chest: Effort normal. She has no wheezes. She has no rhonchi. She has no rales. Abdominal: Soft. Normal appearance. She exhibits no abdominal bruit and no mass. There is no hepatosplenomegaly. There is no tenderness. Musculoskeletal: She exhibits edema (1+ lower legs). Lymphadenopathy:     She has no cervical adenopathy. Right: No supraclavicular adenopathy present. Left: No supraclavicular adenopathy present. Neurological: She is alert and oriented to person, place, and time. No sensory deficit. Skin: Skin is warm, dry and intact. No rash noted. Psychiatric: She has a normal mood and affect. Her behavior is normal.   Nursing note and vitals reviewed. ASSESSMENT and PLAN    ICD-10-CM ICD-9-CM    1. Medicare annual wellness visit, subsequent Z00.00 V70.0    2. Chronic systolic congestive heart failure (HCC) I50.22 428.22 furosemide (LASIX) 40 mg tablet     428.0    3. Acute bronchitis, unspecified organism J20.9 466.0    4. Ischemic cardiomyopathy I25.5 414.8          Medicare annual wellness visit, subsequent    Chronic systolic congestive heart failure (HCC)  -     furosemide (LASIX) 40 mg tablet; Take 1 Tab by mouth daily. Acute bronchitis, unspecified organism  Discontinue cough suppressant, and continue mucolytic. Ischemic cardiomyopathy      Follow-up Disposition:  Return in about 2 weeks (around 6/26/2017) for CHF, Bronchitis . I have discussed the diagnosis with the patient and the intended plan as seen in the above orders. Patient is in agreement. The patient has received an after-visit summary and questions were answered concerning future plans. I have discussed medication side effects and warnings with the patient as well.

## 2017-06-12 NOTE — PROGRESS NOTES
Reviewed record In preparation for visit and have obtained necessary documentation. Has info on advanced directive but has not filled them out. 1. Have you been to the ER, urgent care clinic or hospitalized since your last visit? ED [facility] 6/1/17, ED [facility] ER 5/28/17    2. Have you seen or consulted any other health care providers outside of the [address] [place] [address] since your last visit? Include any pap smears or colon screening.  Dr Smita Lambert Maintenance reviewed:

## 2017-06-12 NOTE — PATIENT INSTRUCTIONS
Increase furosemide to 40 mg once a day  Weigh daily. Stop Robitussin DM and take Mucinex twice a day. Return in 2 weeks. We will get fasting lab on 6/22. [address]   [address] [facility], [address]   Phone: [phone]  Fax: [phone]  [facility]  [address]  Phone: [phone]     [facility]  [address], [place], [place], [address]  Phone: [phone]    [facility]  Dr Earle Rodriguez  [address] [facility], [address]  Phone: [phone]  Fax: [phone]    [facility]  [address] [facility], [address]  [phone]    The best way to stay healthy is to live a healthy lifestyle. A healthy lifestyle includes regular exercise, eating a well-balanced diet, keeping a healthy weight and not smoking. Regular physical exams and screening tests are another important way to take care of yourself. Preventive exams provided by health care providers can find health problems early when treatment works best and can keep you from getting certain diseases or illnesses. Preventive services include exams, lab tests, screenings, shots, monitoring and information to help you take care of your own health. All people over 65 should have a pneumonia shot. Pneumonia shots are usually only needed once in a lifetime unless your doctor decides differently. In addition to your physical exam, some screening tests are recommended:    All people over 65 should have a yearly flu shot. People over 65 are at medium to high risk for Hepatitis B. Three shots are needed for complete protection. Bone mass measurement (dexa scan) is recommended every two years. Diabetes Mellitus screening is recommended every year. Glaucoma is an eye disease caused by high pressure in the eye. An eye exam is recommended every year.      Cardiovascular screening tests that check your cholesterol and other blood fat (lipid) levels are recommended every five years. Colorectal Cancer screening tests help to find pre-cancerous polyps (growths in the colon) so they can be removed before they turn into cancer. Tests ordered for screening depend on your personal and family history risk factors. Prostate Cancer Screening (annually up to age 76)    Screening for breast cancer is recommended yearly with a Mammogram.    Screening for cervical and vaginal cancer is recommended with a pelvic and Pap test every two years. However if you have had an abnormal pap in the past  three years or at high risk for cervical or vaginal cancer Medicare will cover a pap test and a pelvic exam every year. Here is a list of your current Health Maintenance items with a due date:  Health Maintenance Due   Topic Date Due    Annual Well Visit  05/20/2017    Glaucoma Screening   05/26/2017            Learning About Heart Failure Zones  What are heart failure zones? Heart failure zones give you an easy way to see changes in your heart failure symptoms. They also tell you when you need to get help. Check every day to see which zone you are in. Green zone. You are doing well. This is where you want to be. · Your weight is stable. This means it is not going up or down. · You breathe easily. · You are sleeping well. You are able to lie flat without shortness of breath. · You can do your usual activities. Yellow zone. Be careful. Your symptoms are changing. Call your doctor. · You have new or increased shortness of breath. · You are dizzy or lightheaded, or you feel like you may faint. · You have sudden weight gain, such as 3 pounds or more in 2 to 3 days or 5 pounds in a week. · You have increased swelling in your legs, ankles, or feet. · You are so tired or weak that you cannot do your usual activities. · You are not sleeping well. Shortness of breath wakes you up at night.  You need extra pillows. Your doctor's name: ____________________________________________________________  Your doctor's contact information: _________________________________________________  Red zone. This is an emergency. Call 911. You have symptoms of sudden heart failure, such as:  · You have severe trouble breathing. · You cough up pink, foamy mucus. · You have a new irregular or fast heartbeat. You have symptoms of a heart attack. These may include:  · Chest pain or pressure, or a strange feeling in the chest.  · Sweating. · Shortness of breath. · Nausea or vomiting. · Pain, pressure, or a strange feeling in the back, neck, jaw, or upper belly or in one or both shoulders or arms. · Lightheadedness or sudden weakness. · A fast or irregular heartbeat. If you have symptoms of a heart attack: After you call 911, the  may tell you to chew 1 adult-strength or 2 to 4 low-dose aspirin. Wait for an ambulance. Do not try to drive yourself. Follow-up care is a key part of your treatment and safety. Be sure to make and go to all appointments, and call your doctor if you are having problems. It's also a good idea to know your test results and keep a list of the medicines you take. Where can you learn more? Go to http://french-conner.info/. Enter T174 in the search box to learn more about \"Learning About Heart Failure Zones. \"  Current as of: January 27, 2016  Content Version: 11.2  © 3761-7605 [employer]. Care instructions adapted under license by [employer] (which disclaims liability or warranty for this information). If you have questions about a medical condition or this instruction, always ask your healthcare professional. [place] [address] any warranty or liability for your use of this information.

## 2017-06-12 NOTE — PROGRESS NOTES
This is a Subsequent Medicare Annual Wellness Visit providing Personalized Prevention Plan Services (PPPS) (Performed 12 months after initial AWV and PPPS )    I have reviewed the patient's medical history in detail and updated the computerized patient record. History     Past Medical History:   Diagnosis Date    CAD (coronary artery disease)     Chronic kidney disease     Congestive heart failure (HCC)     Glucose intolerance (impaired glucose tolerance)     Gout     Hypercholesterolemia     Hypertension     MI (myocardial infarction) ([facility] [address] 1980's    s/p angioplasty    Spondylosis     Stroke ([facility] [address]     Vitamin D deficiency       Past Surgical History:   Procedure Laterality Date    CARDIAC SURG PROCEDURE UNLIST      cardiac stent    HX ANGIOPLASTY  1980s    HX CORONARY STENT PLACEMENT       Current Outpatient Prescriptions   Medication Sig Dispense Refill    albuterol (PROVENTIL HFA, VENTOLIN HFA, PROAIR HFA) 90 mcg/actuation inhaler Take 2 Puffs by inhalation.  furosemide (LASIX) 40 mg tablet Take 1 Tab by mouth daily. 90 Tab 3    guaiFENesin-dextromethorphan (ROBITUSSIN DM) 100-10 mg/5 mL syrup Take 10 mL by mouth every six (6) hours as needed for up to 10 days. 473 mL 0    predniSONE (DELTASONE) 10 mg tablet Take 1 Tab by mouth Multiple. Take 4 tab po daily for 3 days then  Take 2 tab po daily for 3 days  Then take 1 tab po daily for 3 days  Then take 1/2 tab po daily for 3 days then stop 24 Tab 0    acetaminophen (TYLENOL) 325 mg tablet Take 2 Tabs by mouth every six (6) hours as needed for Pain or Fever. 40 Tab 0    cefUROXime (CEFTIN) 250 mg tablet Take 1 Tab by mouth two (2) times a day for 7 days. 14 Tab 0    ULORIC 80 mg tab tablet TAKE 1 TABLET DAILY 90 Tab 1    diphenhydrAMINE (BENADRYL) 25 mg capsule Take 25 mg by mouth every six (6) hours as needed.  multivit-min-FA-lycopen-lutein 0.4-300-250 mg-mcg-mcg tab Take  by mouth.       ferrous sulfate 325 mg (65 mg iron) cpER Take 1 Tab by mouth daily.  nitroglycerin (NITRODUR) 0.4 mg/hr 1 Patch by TransDERmal route daily.  metoprolol succinate (TOPROL-XL) 25 mg XL tablet Take 12.5 mg by mouth daily.  atorvastatin (LIPITOR) 20 mg tablet Take 1 Tab by mouth daily. 90 Tab 3    BIDIL 20-37.5 mg per tablet TAKE ONE TABLET BY MOUTH THREE TIMES DAILY 90 Tab 12    aspirin 81 mg tablet Take 81 mg by mouth. No Known Allergies  Family History   Problem Relation Age of Onset   [facility] Hypertension Mother     Stroke Sister      Social History   Substance Use Topics    Smoking status: Former Smoker     Years: 0.00    Smokeless tobacco: Never Used    Alcohol use No     Patient Active Problem List   Diagnosis Code    Spondylosis M47.9    Benign hypertension with chronic kidney disease, stage III I12.9, N18.3    Vitamin D deficiency E55.9    Prediabetes R73.03    Hypercholesterolemia E78.00    Chronic systolic congestive heart failure (HCC) I50.22    Ischemic cardiomyopathy I25.5    CKD (chronic kidney disease) stage 3, GFR 30-59 ml/min N18.3    Aphasia R47.01    Right sided weakness R53.1    Cerebrovascular disease, arteriosclerotic, post-stroke I67.2, Z86.73    Gout, chronic, with tophus M1A. 9XX1    Osteopenia M85.80    CAD (coronary artery disease), native coronary artery I25.10    Advance directive discussed with patient Z70.80    Acute bronchitis J20.9    Acute respiratory failure with hypoxia (HCC) J96.01       Depression Risk Factor Screening:     PHQ over the last two weeks 3/20/2017   Little interest or pleasure in doing things Not at all   Feeling down, depressed or hopeless Not at all   Total Score PHQ 2 0     Alcohol Risk Factor Screening: On any occasion during the past 3 months, have you had more than 3 drinks containing alcohol? No    Do you average more than 7 drinks per week?   No      Functional Ability and Level of Safety:     Hearing Loss   borderline normal-to-mild    Activities of Daily Living   Self-care. Requires assistance with: no ADLs, uses a walker    Fall Risk     Fall Risk Assessment, last 12 mths 3/20/2017   Able to walk? Yes   Fall in past 12 months?  No     Abuse Screen   Patient is not abused    Review of Systems   Not required    Physical Examination     Evaluation of Cognitive Function:  Mood/affect:  happy  Appearance: age appropriate, casually dressed and within normal Limits  Family member/caregiver input: son      Patient Care Team:  Christofer Hugo MD as PCP - General (Internal Medicine)  Sanna Zhang MD (Cardiology)  Shara Melgoza MD (Rheumatology)  Ml Bautista MD (Nephrology)  Abdirahman Kwok RN as Nurse Navigator    Advice/Referrals/Counseling   Education and counseling provided:  Screening for glaucoma      Assessment/Plan   See other note this date

## 2017-06-21 NOTE — TELEPHONE ENCOUNTER
For now she should wear incontinence protection/briefs. The problem is unlikely to be the fluid pill. Overactive bladder or pelvic muscle weakness are the leading possible diagnoses. We can refer to urologist, as I cannot do work up for this.

## 2017-06-21 NOTE — TELEPHONE ENCOUNTER
Patient called with complaint of not being able to hold her urine since yesterday. Denies pain or/and fever. Patient is taking her lasix as directed only once a day. She has lab work ordered an is scheduled for a lab appt in the early am and a follow up next week. There are no appts available with anyone tomorrow. Please advise.

## 2017-06-22 NOTE — TELEPHONE ENCOUNTER
Spoke to patient this morning when she came in for lab work, per verbal order will do a urine to see if it shows anything. Pt understands we will call her back when we get results, if urine is normal then she will see urology.

## 2017-06-23 NOTE — TELEPHONE ENCOUNTER
Pt called around 3:15pm said she had stopped both pills of her furosemide this morning as it was making her dizzy. She has a BP machine but does not know how to use it and she has not been weighing daily. I asked to speak to her son but he was asleep, advised I would call back in an hour or so. Called back around 5pm and son was gone, when asked about the furosemide she does not know if she is taking it. Pt has an appointment on Monday advised to bring all medications when she comes. She can call over the weekend if she has any further questions, she stated she would.

## 2017-06-25 PROBLEM — J20.9 ACUTE BRONCHITIS: Status: RESOLVED | Noted: 2017-01-01 | Resolved: 2017-01-01

## 2017-06-25 PROBLEM — J96.01 ACUTE RESPIRATORY FAILURE WITH HYPOXIA (HCC): Status: RESOLVED | Noted: 2017-01-01 | Resolved: 2017-01-01

## 2017-06-26 NOTE — MR AVS SNAPSHOT
Visit Information Date & Time Provider Department Dept. Phone Encounter #  
 6/26/2017  2:30 PM MD Wilton Meier [phone] [id number] Follow-up Instructions Return in about 3 months (around 9/26/2017) for HTN, HF, CM  NON-fasting lab one week prior . Your Appointments 7/17/2017  8:15 AM  
ROUTINE CARE with MD Wilton Meier [address] [place] [address]) Appt Note: htn, chol, gluc, chf, fasting lab prior/$0CP KMP 1/17/17  
 [address] [facility] [address] [address] [phone]  
  
   
 [facility]  
  
    
 8/3/2017  1:00 PM  
MEETING with NURSE NAVIGATOR1 Wilton Whittington [address] [place] [address]) Appt Note: honoring choices [address] [facility] [address] [address] [phone]  
  
   
 8 [facility] [address] Upcoming Health Maintenance Date Due  
 GLAUCOMA SCREENING Q2Y 5/26/2017 DTaP/Tdap/Td series (1 - Tdap) 5/13/2019* INFLUENZA AGE 9 TO ADULT 8/1/2017 MEDICARE YEARLY EXAM 6/13/2018 *Topic was postponed. The date shown is not the original due date. Allergies as of 6/26/2017  Review Complete On: 6/26/2017 By: Anurag Acevedo MD  
 No Known Allergies Current Immunizations  Reviewed on 6/26/2017 Name Date Influenza High Dose Vaccine PF 9/16/2016, 10/28/2015, 11/20/2014 Influenza Vaccine 10/9/2013 Influenza Vaccine Split 1/15/2012  5:07 PM  
 Pneumococcal Conjugate (PCV-13) 5/19/2016 Pneumococcal Vaccine (Unspecified Type) 5/13/2009 TD Vaccine 5/13/2009 Reviewed by Devi Altamirano LPN on 9/90/8271 at  2:11 PM  
 Reviewed by Devi Altamirano LPN on 5/20/9111 at  2:13 PM  
 Reviewed by Devi Altamirano LPN on 5/16/5171 at  2:14 PM  
You Were Diagnosed With   
  
 Codes Comments  Benign hypertension with chronic kidney disease, stage III    -  Primary ICD-10-CM: I12.9, N18.3 ICD-9-CM: 403.10, 585.3 Chronic systolic congestive heart failure (HCC)     ICD-10-CM: I50.22 ICD-9-CM: 428.22, 428.0 Hypercholesterolemia     ICD-10-CM: E78.00 ICD-9-CM: 272.0 Glucose intolerance (impaired glucose tolerance)     ICD-10-CM: R73.02 
ICD-9-CM: 790.22 Vitals BP Pulse Temp Resp Height(growth percentile) Weight(growth percentile) 125/51 (BP 1 Location: Left arm, BP Patient Position: Sitting) 75 97.4 °F (36.3 °C) (Oral) 14 5' 5\" (1.651 m) 152 lb 8 oz (69.2 kg) SpO2 BMI OB Status Smoking Status 95% 25.38 kg/m2 Postmenopausal Former Smoker BMI and BSA Data Body Mass Index Body Surface Area  
 25.38 kg/m 2 1.78 m 2 Preferred Pharmacy Pharmacy Name Phone [address] [facility] [phone] Your Updated Medication List  
  
   
This list is accurate as of: 6/26/17  3:25 PM.  Always use your most recent med list.  
  
  
  
  
 acetaminophen 325 mg tablet Commonly known as:  TYLENOL Take 2 Tabs by mouth every six (6) hours as needed for Pain or Fever. albuterol 90 mcg/actuation inhaler Commonly known as:  PROVENTIL HFA, VENTOLIN HFA, PROAIR HFA Take 2 Puffs by inhalation. aspirin 81 mg tablet Take 81 mg by mouth. atorvastatin 20 mg tablet Commonly known as:  LIPITOR Take 1 Tab by mouth daily. BENADRYL 25 mg capsule Generic drug:  diphenhydrAMINE Take 25 mg by mouth every six (6) hours as needed. BIDIL 20-37.5 mg per tablet Generic drug:  isosorbide-hydrALAZINE  
TAKE ONE TABLET BY MOUTH THREE TIMES DAILY ferrous sulfate 325 mg (65 mg iron) Cper Take 1 Tab by mouth daily. furosemide 20 mg tablet Commonly known as:  LASIX  
  
 metoprolol succinate 25 mg XL tablet Commonly known as:  TOPROL-XL Take 12.5 mg by mouth daily. multivit-min-FA-lycopen-lutein 0.4-300-250 mg-mcg-mcg Tab Take  by mouth. nitroglycerin 0.4 mg/hr Commonly known as:  NITRODUR  
1 Patch by TransDERmal route daily. ULORIC 80 mg Tab tablet Generic drug:  febuxostat TAKE 1 TABLET DAILY We Performed the Following METABOLIC PANEL, BASIC [32222 CPT(R)] Follow-up Instructions Return in about 3 months (around 9/26/2017) for HTN, HF, CM  NON-fasting lab one week prior . Patient Instructions Lay down for 30 minutes before lunch and dinner with legs above heart level. Office visit in 3 months with non-fasting lab work a week before visit. .  
  
  
Creatinine Tests: About These Tests What are they? Creatinine tests measure the level of the waste product creatinine (say \"khim-IT-tk-neen\") in your blood and urine. These tests show how well your kidneys are working. When the kidneys are not working well, they cannot filter creatinine from the blood. This causes the level of creatinine in the blood to go up and the creatinine clearancethe test that measures how well your kidneys remove creatinineto go down. Why are these tests done? A blood creatinine level or a creatinine clearance test is done to: 
· See if your kidneys are working normally or if a medicine is affecting your kidneys. · See if your kidney disease is staying the same or getting better or worse. How can you prepare for the tests? You may be asked to: · Not do any strenuous exercise for 2 days (48 hours) before having the tests. · Not eat more than 8 ounces of meat, especially beef, or other protein for 24 hours before the blood creatinine test and during the creatinine clearance urine test. 
· Drink plenty of fluids if you are asked to collect your urine for 24 hours. But do not drink coffee or tea.  These are diuretics that cause your body to pass more urine than normal. 
Tell your doctor about all the nonprescription and prescription medicines and herbs or other supplements you take. Some medicines and supplements can affect the results of these tests. What happens before the tests? If you are asked to collect urine, your doctor will give you a large container that holds about 1 gallon. You will use the container to collect your urine for 24 hours. What happens during the tests? For the urine tests You collect your urine for 24 hours. · You start collecting your urine in the morning. When you first get up, empty your bladder, but do not save this urine. Write down the time that you urinated to teresita the beginning of your 24-hour collection period. · For the next 24 hours, collect all your urine. Urinate into a small, clean container, and then pour the urine into the large container. Do not touch the inside of the container with your fingers. · Keep the collected urine in the refrigerator for the 24 hours. Empty your bladder for the final time at or just before the end of the 24-hour period. Add this urine to the large container, record the time, and bring the container to the lab or doctor's office. For the blood test 
A health professional takes a sample of your blood. How long do the tests take? · The urine test will take 24 hours. · The blood test will take a few minutes. What happens after the tests? · After the blood test, you will be able to go home right away. · You can go back to your usual activities right away. When should you call for help? Watch closely for changes in your health, and be sure to contact your doctor if you have any problems. Follow-up care is a key part of your treatment and safety. Be sure to make and go to all appointments, and call your doctor if you are having problems. It's also a good idea to keep a list of the medicines you take. Ask your doctor when you can expect to have your test results. Where can you learn more? Go to http://french-conner.info/. Enter F486 in the search box to learn more about \"Creatinine Tests: About These Tests. \" Current as of: August 8, 2016 Content Version: 11.3 © 6402-9427 [employer], [employer]. Care instructions adapted under license by [employer] (which disclaims liability or warranty for this information). If you have questions about a medical condition or this instruction, always ask your healthcare professional. [address] any warranty or liability for your use of this information. Introducing [facility]! [facility] introduces SnapDash patient portal. Now you can access parts of your medical record, email your doctor's office, and request medication refills online. 1. In your internet browser, go to [email or web address] 2. Click on the First Time User? Click Here link in the Sign In box. You will see the New Member Sign Up page. 3. Enter your SnapDash Access Code exactly as it appears below. You will not need to use this code after youve completed the sign-up process. If you do not sign up before the expiration date, you must request a new code. · SnapDash Access Code: [email or web address] Expires: 8/30/2017  1:16 PM 
 
4. Enter the last four digits of your Social Security Number (xxxx) and Date of Birth (mm/dd/yyyy) as indicated and click Submit. You will be taken to the next sign-up page. 5. Create a SnapDash ID. This will be your SnapDash login ID and cannot be changed, so think of one that is secure and easy to remember. 6. Create a SnapDash password. You can change your password at any time. 7. Enter your Password Reset Question and Answer. This can be used at a later time if you forget your password. 8. Enter your e-mail address. You will receive e-mail notification when new information is available in [address] 9. Click Sign Up. You can now view and download portions of your medical record. 10. Click the Download Summary menu link to download a portable copy of your medical information. If you have questions, please visit the Frequently Asked Questions section of the [employer] website. Remember, [employer] is NOT to be used for urgent needs. For medical emergencies, dial 911. Now available from your iPhone and Android! Please provide this summary of care documentation to your next provider. Your primary care clinician is listed as Sanju Schneider. If you have any questions after today's visit, please call [phone].

## 2017-06-26 NOTE — PROGRESS NOTES
Reviewed record In preparation for visit and have obtained necessary documentation. Has an appointment to fill out advanced directive. 1. Have you been to the ER, urgent care clinic or hospitalized since your last visit? No     2. Have you seen or consulted any other health care providers outside of the [address] [place] [address] since your last visit? Include any pap smears or colon screening.  No    Health Maintenance reviewed:

## 2017-06-26 NOTE — PATIENT INSTRUCTIONS
Lay down for 30 minutes before lunch and dinner with legs above heart level. Office visit in 3 months with non-fasting lab work a week before visit. .         Creatinine Tests: About These Tests  What are they? Creatinine tests measure the level of the waste product creatinine (say \"pqhr-YD-ha-neen\") in your blood and urine. These tests show how well your kidneys are working. When the kidneys are not working well, they cannot filter creatinine from the blood. This causes the level of creatinine in the blood to go up and the creatinine clearance--the test that measures how well your kidneys remove creatinine--to go down. Why are these tests done? A blood creatinine level or a creatinine clearance test is done to:  · See if your kidneys are working normally or if a medicine is affecting your kidneys. · See if your kidney disease is staying the same or getting better or worse. How can you prepare for the tests? You may be asked to:  · Not do any strenuous exercise for 2 days (48 hours) before having the tests. · Not eat more than 8 ounces of meat, especially beef, or other protein for 24 hours before the blood creatinine test and during the creatinine clearance urine test.  · Drink plenty of fluids if you are asked to collect your urine for 24 hours. But do not drink coffee or tea. These are diuretics that cause your body to pass more urine than normal.  Tell your doctor about all the nonprescription and prescription medicines and herbs or other supplements you take. Some medicines and supplements can affect the results of these tests. What happens before the tests? If you are asked to collect urine, your doctor will give you a large container that holds about 1 gallon. You will use the container to collect your urine for 24 hours. What happens during the tests? For the urine tests  You collect your urine for 24 hours. · You start collecting your urine in the morning.  When you first get up, empty your bladder, but do not save this urine. Write down the time that you urinated to teresita the beginning of your 24-hour collection period. · For the next 24 hours, collect all your urine. Urinate into a small, clean container, and then pour the urine into the large container. Do not touch the inside of the container with your fingers. · Keep the collected urine in the refrigerator for the 24 hours. Empty your bladder for the final time at or just before the end of the 24-hour period. Add this urine to the large container, record the time, and bring the container to the lab or doctor's office. For the blood test  A health professional takes a sample of your blood. How long do the tests take? · The urine test will take 24 hours. · The blood test will take a few minutes. What happens after the tests? · After the blood test, you will be able to go home right away. · You can go back to your usual activities right away. When should you call for help? Watch closely for changes in your health, and be sure to contact your doctor if you have any problems. Follow-up care is a key part of your treatment and safety. Be sure to make and go to all appointments, and call your doctor if you are having problems. It's also a good idea to keep a list of the medicines you take. Ask your doctor when you can expect to have your test results. Where can you learn more? Go to http://french-conner.info/. Enter F486 in the search box to learn more about \"Creatinine Tests: About These Tests. \"  Current as of: August 8, 2016  Content Version: 11.3  © 1708-9197 [employer]. Care instructions adapted under license by [employer] (which disclaims liability or warranty for this information). If you have questions about a medical condition or this instruction, always ask your healthcare professional. [address] any warranty or liability for your use of this information.

## 2017-06-26 NOTE — PROGRESS NOTES
HISTORY OF PRESENT ILLNESS  Dewey Treviño is a 80 y.o. female. She is accompanied by her son. HPI   She presents for follow up of hypertension, Systolic HF. cough, wheezing, and edema. She also shas glucose intolerance and hyperlipidemia. She has had resolution of cough that resulted in hospitalization early this month. When seen on 6/12, furosemide was increased from 20 to 40 mg daily. However on 6/23 she felt dizzy and stopped taking furosemide. Weight is down 5 lbs since 6/12. Swelling is somewhat improved. She is not keeping legs elevated. Diet and Lifestyle: generally follows a low sodium diet, sedentary, nonsmoker  Medication compliance: compliant all of the time  Medication side effects: none  Home BP Monitoring:  is not measured at home  Cardiovascular ROS:  She complains of dyspnea on exertion. lower extremity edema    She denies palpitations, orthopnea, exertional chest pressure/discomfort,     Patient Active Problem List   Diagnosis Code    Spondylosis M47.9    Benign hypertension with chronic kidney disease, stage III I12.9, N18.3    Vitamin D deficiency E55.9    Prediabetes R73.03    Hypercholesterolemia E78.00    Chronic systolic congestive heart failure (HCC) I50.22    Ischemic cardiomyopathy I25.5    CKD (chronic kidney disease) stage 3, GFR 30-59 ml/min N18.3    Aphasia R47.01    Right sided weakness R53.1    Cerebrovascular disease, arteriosclerotic, post-stroke I67.2, Z86.73    Gout, chronic, with tophus M1A. 9XX1    Osteopenia M85.80    CAD (coronary artery disease), native coronary artery I25.10    Advance directive discussed with patient Z70.80     Past Medical History:   Diagnosis Date    CAD (coronary artery disease)     Chronic kidney disease     Congestive heart failure (HCC)     Glucose intolerance (impaired glucose tolerance)     Gout     Hypercholesterolemia     Hypertension     MI [facility]ocardial infarction) ([facility] [address] 1980's    s/p angioplasty    Spondylosis     Stroke [facility] [address]     Vitamin D deficiency      Past Surgical History:   Procedure Laterality Date    CARDIAC SURG PROCEDURE UNLIST      cardiac stent    HX ANGIOPLASTY  1980s    HX CORONARY STENT PLACEMENT       Social History     Social History    Marital status:      Spouse name: N/A    Number of children: N/A    Years of education: N/A     Social History Main Topics    Smoking status: Former Smoker     Years: 0.00    Smokeless tobacco: Never Used    Alcohol use No    Drug use: No    Sexual activity: No     Other Topics Concern    None     Social History Narrative     Family History   Problem Relation Age of Onset    Hypertension Mother     Stroke Sister      No Known Allergies  Current Outpatient Prescriptions   Medication Sig Dispense Refill    furosemide (LASIX) 20 mg tablet       ULORIC 80 mg tab tablet TAKE 1 TABLET DAILY 90 Tab 0    albuterol (PROVENTIL HFA, VENTOLIN HFA, PROAIR HFA) 90 mcg/actuation inhaler Take 2 Puffs by inhalation.  acetaminophen (TYLENOL) 325 mg tablet Take 2 Tabs by mouth every six (6) hours as needed for Pain or Fever. 40 Tab 0    diphenhydrAMINE (BENADRYL) 25 mg capsule Take 25 mg by mouth every six (6) hours as needed.  multivit-min-FA-lycopen-lutein 0.4-300-250 mg-mcg-mcg tab Take  by mouth.  ferrous sulfate 325 mg (65 mg iron) cpER Take 1 Tab by mouth daily.  nitroglycerin (NITRODUR) 0.4 mg/hr 1 Patch by TransDERmal route daily.  metoprolol succinate (TOPROL-XL) 25 mg XL tablet Take 12.5 mg by mouth daily.  atorvastatin (LIPITOR) 20 mg tablet Take 1 Tab by mouth daily. 90 Tab 3    BIDIL 20-37.5 mg per tablet TAKE ONE TABLET BY MOUTH THREE TIMES DAILY 90 Tab 12    aspirin 81 mg tablet Take 81 mg by mouth. Review of Systems   Constitutional: Positive for malaise/fatigue and weight loss (5 lbs since last visit).      Visit Vitals    /51 (BP 1 Location: Left arm, BP Patient Position: Sitting)    Pulse 75    Temp 97.4 °F (36.3 °C) (Oral)    Resp 14    Ht 5' 5\" (1.651 m)    Wt 152 lb 8 oz (69.2 kg)    SpO2 95%    BMI 25.38 kg/m2     Physical Exam   Constitutional: She is oriented to person, place, and time. She appears well-developed and well-nourished. HENT:   Head: Normocephalic and atraumatic. Eyes: Pupils are equal, round, and reactive to light. Neck: Neck supple. Cardiovascular: Normal rate, regular rhythm and normal heart sounds. Exam reveals no gallop. No murmur heard. Pulmonary/Chest: Effort normal and breath sounds normal. She has no wheezes. She has no rales. Abdominal: Soft. Normal appearance and bowel sounds are normal. She exhibits no distension and no mass. There is no hepatosplenomegaly. There is no tenderness. Musculoskeletal: She exhibits edema (left 1+ at ankle, right trace ). Neurological: She is alert and oriented to person, place, and time. Skin: Skin is warm and dry. No erythema. Nursing note and vitals reviewed.     Results for orders placed or performed in visit on 06/22/17   HEMOGLOBIN A1C WITH EAG   Result Value Ref Range    Hemoglobin A1c 6.2 (H) 4.8 - 5.6 %    Estimated average glucose 131 mg/dL   LIPID PANEL   Result Value Ref Range    Cholesterol, total 154 100 - 199 mg/dL    Triglyceride 154 (H) 0 - 149 mg/dL    HDL Cholesterol 51 >39 mg/dL    VLDL, calculated 31 5 - 40 mg/dL    LDL, calculated 72 0 - 99 mg/dL   METABOLIC PANEL, COMPREHENSIVE   Result Value Ref Range    Glucose 103 (H) 65 - 99 mg/dL    BUN 32 10 - 36 mg/dL    Creatinine 1.87 (H) 0.57 - 1.00 mg/dL    GFR est non-AA 23 (L) >59 mL/min/1.73    GFR est AA 27 (L) >59 mL/min/1.73    BUN/Creatinine ratio 17 12 - 28    Sodium 142 134 - 144 mmol/L    Potassium 4.5 3.5 - 5.2 mmol/L    Chloride 100 96 - 106 mmol/L    CO2 25 18 - 29 mmol/L    Calcium 8.9 8.7 - 10.3 mg/dL    Protein, total 6.2 6.0 - 8.5 g/dL    Albumin 3.5 3.2 - 4.6 g/dL    GLOBULIN, TOTAL 2.7 1.5 - 4.5 g/dL    A-G Ratio 1.3 1.2 - 2.2    Bilirubin, total 0.7 0.0 - 1.2 mg/dL    Alk. phosphatase 79 39 - 117 IU/L    AST (SGOT) 25 0 - 40 IU/L    ALT (SGPT) 16 0 - 32 IU/L   UA/M W/RFLX CULTURE, ROUTINE   Result Value Ref Range    Specific Gravity 1.019 1.005 - 1.030    pH (UA) 7.5 5.0 - 7.5    Color Yellow Yellow    Appearance Clear Clear    Leukocyte Esterase Negative Negative    Protein 1+ (A) Negative/Trace    Glucose Negative Negative    Ketone Negative Negative    Blood Negative Negative    Bilirubin Negative Negative    Urobilinogen 1.0 0.2 - 1.0 mg/dL    Nitrites Negative Negative    Microscopic Examination See additional order     URINALYSIS REFLEX Comment    MICROSCOPIC EXAMINATION   Result Value Ref Range    WBC 0-5 0 - 5 /hpf    RBC None seen 0 - 2 /hpf    Epithelial cells 0-10 0 - 10 /hpf    Casts Present (A) None seen /lpf    Cast type Hyaline casts N/A    Mucus Present Not Estab. Bacteria Few None seen/Few   CVD REPORT   Result Value Ref Range    INTERPRETATION NTAP     PDF IMAGE Not applicable    CKD REPORT   Result Value Ref Range    Interpretation Note        ASSESSMENT and PLAN    ICD-10-CM ICD-9-CM    1. Benign hypertension with chronic kidney disease, stage III Y28.2 563.08 METABOLIC PANEL, BASIC    E61.0 585.3    2. Chronic systolic congestive heart failure (HCC) I50.22 428.22      428.0    3. Hypercholesterolemia E78.00 272.0    4. Glucose intolerance (impaired glucose tolerance) R73.02 790.22          Benign hypertension with chronic kidney disease, stage III  Blood pressure is at goal and creatinine is stable. -     METABOLIC PANEL, BASIC    Chronic systolic congestive heart failure (HCC)  Improved. Hypercholesterolemia  Hyperlipidemia is controlled    Glucose intolerance (impaired glucose tolerance)      Follow-up Disposition:  Return in about 3 months (around 9/26/2017) for HTN, HF, CM  NON-fasting lab one week prior .   lab results and schedule of future lab studies reviewed with patient  reviewed diet, exercise and weight control  I have discussed the diagnosis with the patient and the intended plan as seen in the above orders. Patient is in agreement. The patient has received an after-visit summary and questions were answered concerning future plans. I have discussed medication side effects and warnings with the patient as well.

## 2017-07-06 NOTE — TELEPHONE ENCOUNTER
Patient called to speak with nurse in regards to her furosemide (LASIX) 20 mg tablet. Pt has two bottles of the some medication and is not sure which one to take. Patient stated on pill is bigger then the other.

## 2017-08-03 NOTE — ACP (ADVANCE CARE PLANNING)
Advance Care Planning Conversation  First Steps®     Prior to today's conversation, did individual have existing ACP documents? [] Yes  [x] No    Date of conversation: 8/3/17   Location: [facility]    Participants:   [x] Patient      Individual's Fears/Concerns about planning: none     Goals/Narrative of Conversation: Discussed facilitators role and purpose of the program.  Discussed ACP planning and what it is about along with the patient's understanding of what ACP planning is. Explored and discussed the patient's experiences with family who were seriously ill or injured. Explored and discussed living well and what that means to the patient. Provided a brief overview of the 3 decisions in First Steps ACP. Explored and discussed cultural, Mosque, and personal beliefs that the patient has. Discussed CPR and mechanical ventilation and patient gave their views of how they felt about them. Explored and discussed scenario regarding the patient's goals of care for a severe permanent brain injury. Discussed anatomical gifts with the patient. Explained that in any scenario or decision that is made, the patient will always be made comfortable. Clarified any questions and concerns that the patient had. Conversation topics for Individual    Has learned the following from prior experiences with serious illness: the patient states that she has not had any    Identifies the following as important for living well: being able to take care of herself    \"What cultural, Mosque, spiritual, or personal beliefs (if any) do you have that might help you choose the care you want, or do not want? \"  Patient response: none    \"Would you like to talk to someone about these beliefs or concerns? \"  Patient response: no      Agent not present. The patient states that her family knows what she wants and what her wishes are.     First Steps® ACP Facilitator: Mo Reyez RN    Length (minutes): 45    The following was provided (check all that apply):   [x] Clarification of the goals of ACP    [x] Criteria for choosing a healthcare agent   [x] Written information on the planning process      Healthcare Decision Information Cards:   [x] Help with Breathing Facts   [x] Tube Feeding Facts   [x] CPR Facts      [x] Review of the completion of the advance directive     Meeting Outcomes:   [x] ACP discussion completed   [x] Advance directive form completed   [x] Original of completed advance directive given to patient   [x] Copy given to healthcare agent    [x] Copy scanned to electronic medical record       Follow-up plan:     [] Schedule follow-up conversation to continue planning   [] Referred individual to provider for additional questions/concerns    [] Individual will invite agent/prospective agent to next ACP appointment   [x] Recommended to review completed AMD annually or upon change in health status     [x] This note routed to one or more involved healthcare providers

## 2017-08-30 NOTE — PROGRESS NOTES
Physical Therapy Screening:  Physical Therapy consult is not indicated at this time. A screening was performed on this patient's chart based on their entrance into the emergency department and potential need for physical therapy identified. The patients chart was reviewed and the patient was interviewed/screened. Rounded with MD watts: this ACO pt who comes chief c/o of body aches. No known mobility issues. Met with pt who states she uses her rollator mod I on one story of home with ramp entry. States she has BSC and shower chair and completes her ADLs on her own. She lives with family. She has had no falls. Reviewed fall safety. No acute ED PT needs at this time. A physical therapy consult is not indicated at this time based on their prior level of function or current medical status. Please consult physical therapy if any therapy needs arise. Thank you.      Jaime Calero, PT

## 2017-08-30 NOTE — ED NOTES
Dr Yolis Licona reviewed discharge instructions with the patient and caregiver. The patient and caregiver verbalized understanding. Pt is AAOx4, respirations unlabored and regular ,skin warm and dry. NAD noted. Pt is ambulatory at baseline per pt and pt's son. RN assisted pt to car via wheelchair.

## 2017-08-30 NOTE — ED PROVIDER NOTES
HPI Comments: Morgan Brown is a 80 y.o. female with PMhx significant for CAD, MI (1980s), spondylosis, HTN, CKD, CHF, CVA who presents ambulatory to the ED with cc of gradually worsening generalized body aches x 2 days. Pt reports associated intermittent SOB, urinary frequency with leakage noting that she wears adult diapers, cough that onset 2 weeks ago, decreased appetite, and mild HA. She states that she took tylenol noting her last dose was this morning at ~ 0600 with some relief of her symptoms. Pt also reports bilateral leg swelling that is unchanged from baseline. She denies any recent sick contact or recent falls. Pt specifically denies any N/V/D, fevers, chills, dysuria, blood in her stool, hematuria, rash, weight changes, or abdominal pain. Social history significant for: - Tobacco, - EtOH, - Illicit drug use    PCP: Dinorah Garay MD    There are no other complaints, changes or physical findings at this time. Written by Prachi Duarte ED Scribe, as dictated by Blessing Syed MD.      The history is provided by the patient. No  was used. Past Medical History:   Diagnosis Date    CAD (coronary artery disease)     Chronic kidney disease     Congestive heart failure (HCC)     Glucose intolerance (impaired glucose tolerance)     Gout     Hypercholesterolemia     Hypertension     MI (myocardial infarction) ([facility] [address] 1980's    s/p angioplasty    Spondylosis     Stroke ([facility] [address]     Vitamin D deficiency        Past Surgical History:   Procedure Laterality Date    CARDIAC SURG PROCEDURE UNLIST      cardiac stent    HX ANGIOPLASTY  1980s    HX CORONARY STENT PLACEMENT           Family History:   Problem Relation Age of Onset    Hypertension Mother     Stroke Sister        Social History     Social History    Marital status:      Spouse name: N/A    Number of children: N/A    Years of education: N/A     Occupational History    Not on file.      Social History Main Topics    Smoking status: Former Smoker     Years: 0.00    Smokeless tobacco: Never Used    Alcohol use No    Drug use: No    Sexual activity: No     Other Topics Concern    Not on file     Social History Narrative         ALLERGIES: Review of patient's allergies indicates no known allergies. Review of Systems   Constitutional: Positive for appetite change (decreased). Negative for chills, fever and unexpected weight change. HENT: Negative. Eyes: Negative. Respiratory: Positive for cough and shortness of breath. Cardiovascular: Positive for leg swelling (baseline). Gastrointestinal: Negative. Negative for abdominal pain, diarrhea, nausea and vomiting. Endocrine: Negative. Genitourinary: Positive for frequency. Negative for dysuria. Musculoskeletal: Positive for arthralgias (genralized) and myalgias (generalized). Skin: Negative. Negative for rash. Allergic/Immunologic: Negative. Neurological: Positive for headaches (Mild). Hematological: Negative. Psychiatric/Behavioral: Negative. All other systems reviewed and are negative. Patient Vitals for the past 12 hrs:   Temp Pulse Resp BP SpO2   08/30/17 1235 97.8 °F (36.6 °C) 74 18 178/61 98 %     Physical Exam     Vital signs and nursing notes reviewed    CONSTITUTIONAL: Alert, Moderate distress, appears younger than stated age; well-developed; well-nourished. HEAD:  Normocephalic, atraumatic  EYES: PERRL; EOM's intact. ENTM: Nose: no rhinorrhea; Throat: no erythema or exudate, mucous membranes moist  Neck:  Supple. trachea is midline. RESP: Chest clear, equal breath sounds. - W/R/R  CV: S1 and S2 WNL; No murmurs, gallops or rubs. 2+ radial and DP pulses bilaterally. GI: non-distended, normal bowel sounds, abdomen soft and non-tender. No masses or organomegaly. : No costo-vertebral angle tenderness. BACK:  Non-tender, normal appearance, Slightly hunched over  UPPER EXT:  Normal inspection.  no joint or soft tissue swelling  LOWER EXT: 1+ edema to the knees, no calf tenderness. NEURO: Alert and oriented x3, 5/5 strength and light touch sensation intact in bilateral upper and lower extremities. SKIN: No rashes; Warm and dry  PSYCH: Normal mood, normal affect    MDM  Number of Diagnoses or Management Options  Diagnosis management comments:    80 y.o. female presenting with generalized body aches without evidence of infections on vital signs, history, or exam. Pt has had some urinary incontinence chronically. Will check urine for infection, CXR for PNA. No cardiac symptoms and stable EKG. Pt tolerating PO. If emergent work up reassuring plan for discharge with PCP follow up. Amount and/or Complexity of Data Reviewed  Clinical lab tests: ordered and reviewed  Tests in the radiology section of CPT®: ordered and reviewed  Review and summarize past medical records: yes  Independent visualization of images, tracings, or specimens: yes    Patient Progress  Patient progress: stable    Procedures    EKG interpretation: (Preliminary) 1253  Rhythm: normal sinus rhythm; and regular . Rate (approx.): 66; Axis: left axis deviation (old); CT interval: normal; QRS interval: normal (old Q waves) ; ST/T wave: T wave inversions in V1 and aVL old when compared to EKG preformed on 10/10/2016; Other findings: 1st degree AV block, old when compared to EKG preformed on 10/10/2016. No acute ST changes.   Written by RHEA Woodsibcherie, as dictated by Jenni Robert MD    LABORATORY TESTS:  Recent Results (from the past 12 hour(s))   EKG, 12 LEAD, INITIAL    Collection Time: 08/30/17 12:53 PM   Result Value Ref Range    Ventricular Rate 66 BPM    Atrial Rate 66 BPM    P-R Interval 250 ms    QRS Duration 88 ms    Q-T Interval 404 ms    QTC Calculation (Bezet) 423 ms    Calculated P Axis 39 degrees    Calculated R Axis -36 degrees    Calculated T Axis 118 degrees    Diagnosis       Sinus rhythm with 1st degree AV block  Left axis deviation  Minimal voltage criteria for LVH, may be normal variant  Inferior infarct (cited on or before 13-NOV-2010)  Anteroseptal infarct (cited on or before 13-NOV-2010)  ST & T wave abnormality, consider lateral ischemia  Abnormal ECG  When compared with ECG of 01-JUN-2017 13:28,  MS interval has increased  Vent. rate has decreased BY  35 BPM     CBC WITH AUTOMATED DIFF    Collection Time: 08/30/17  1:19 PM   Result Value Ref Range    WBC 3.7 3.6 - 11.0 K/uL    RBC 3.26 (L) 3.80 - 5.20 M/uL    HGB 10.1 (L) 11.5 - 16.0 g/dL    HCT 31.8 (L) 35.0 - 47.0 %    MCV 97.5 80.0 - 99.0 FL    MCH 31.0 26.0 - 34.0 PG    MCHC 31.8 30.0 - 36.5 g/dL    RDW 14.2 11.5 - 14.5 %    PLATELET 117 (L) 899 - 400 K/uL    NEUTROPHILS 37 32 - 75 %    LYMPHOCYTES 48 12 - 49 %    MONOCYTES 9 5 - 13 %    EOSINOPHILS 5 0 - 7 %    BASOPHILS 1 0 - 1 %    ABS. NEUTROPHILS 1.4 (L) 1.8 - 8.0 K/UL    ABS. LYMPHOCYTES 1.8 0.8 - 3.5 K/UL    ABS. MONOCYTES 0.4 0.0 - 1.0 K/UL    ABS. EOSINOPHILS 0.2 0.0 - 0.4 K/UL    ABS. BASOPHILS 0.0 0.0 - 0.1 K/UL   METABOLIC PANEL, COMPREHENSIVE    Collection Time: 08/30/17  1:19 PM   Result Value Ref Range    Sodium 137 136 - 145 mmol/L    Potassium 4.9 3.5 - 5.1 mmol/L    Chloride 106 97 - 108 mmol/L    CO2 27 21 - 32 mmol/L    Anion gap 4 (L) 5 - 15 mmol/L    Glucose 94 65 - 100 mg/dL    BUN 41 (H) 6 - 20 MG/DL    Creatinine 2.19 (H) 0.55 - 1.02 MG/DL    BUN/Creatinine ratio 19 12 - 20      GFR est AA 26 (L) >60 ml/min/1.73m2    GFR est non-AA 21 (L) >60 ml/min/1.73m2    Calcium 9.6 8.5 - 10.1 MG/DL    Bilirubin, total 0.4 0.2 - 1.0 MG/DL    ALT (SGPT) 14 12 - 78 U/L    AST (SGOT) 22 15 - 37 U/L    Alk.  phosphatase 77 45 - 117 U/L    Protein, total 7.5 6.4 - 8.2 g/dL    Albumin 3.4 (L) 3.5 - 5.0 g/dL    Globulin 4.1 (H) 2.0 - 4.0 g/dL    A-G Ratio 0.8 (L) 1.1 - 2.2     MAGNESIUM    Collection Time: 08/30/17  1:19 PM   Result Value Ref Range    Magnesium 2.4 1.6 - 2.4 mg/dL   CK    Collection Time: 08/30/17 1:19 PM   Result Value Ref Range     26 - 192 U/L   URINALYSIS W/ RFLX MICROSCOPIC    Collection Time: 08/30/17  1:51 PM   Result Value Ref Range    Color YELLOW/STRAW      Appearance CLEAR CLEAR      Specific gravity 1.012 1.003 - 1.030      pH (UA) 5.0 5.0 - 8.0      Protein NEGATIVE  NEG mg/dL    Glucose NEGATIVE  NEG mg/dL    Ketone NEGATIVE  NEG mg/dL    Bilirubin NEGATIVE  NEG      Blood NEGATIVE  NEG      Urobilinogen 0.2 0.2 - 1.0 EU/dL    Nitrites NEGATIVE  NEG      Leukocyte Esterase SMALL (A) NEG      WBC 0-4 0 - 4 /hpf    RBC 0-5 0 - 5 /hpf    Epithelial cells FEW FEW /lpf    Bacteria 4+ (A) NEG /hpf    UA:UC IF INDICATED CULTURE NOT NEEDED CNI       IMAGING RESULTS:  CXR Results  (Last 48 hours)               08/30/17 1332  XR CHEST PA LAT Final result    Impression:  IMPRESSION: No acute abnormality identified. Elevation right hemidiaphragm is   stable. Narrative:  EXAM:  XR CHEST PA LAT       INDICATION:   acute cough; eval for infiltrate       COMPARISON: 2017. FINDINGS: PA and lateral radiographs of the chest demonstrate moderate elevation   of the right hemidiaphragm which is unchanged. No focal airspace process is   identified. . Cardiomegaly is stable. .  Slight compression deformity T12 is   unchanged. Eulalio Trujillo IMPRESSION:  1. Body aches    2. Anemia, unspecified type    3. CKD (chronic kidney disease), unspecified stage    4. Urinary incontinence, unspecified type        PLAN:  1. Current Discharge Medication List      START taking these medications    Details   ciprofloxacin HCl (CIPRO) 500 mg tablet Take 1 Tab by mouth two (2) times a day for 7 days. Qty: 14 Tab, Refills: 0           2. Follow-up Information     Follow up With Details Comments Contact Info    [facility] EMERGENCY DEPT  If symptoms worsen including fever, new abdominal pain, uncontrolled body aches or other new concerning symptoms.  [address] [facility] [phone]        Return to ED if worse     DISCHARGE NOTE  2:39 PM  The patient has been re-evaluated and is ready for discharge. Reviewed available results with patient. Counseled patient on diagnosis and care plan. Patient has expressed understanding, and all questions have been answered. Patient agrees with plan and agrees to follow up as recommended, or return to the ED if their symptoms worsen. Discharge instructions have been provided and explained to the patient, along with reasons to return to the ED. This note is prepared by Pamela Choudhury, acting as Scribe for Lex Pimentel MD.    Lex Pimentel MD: The scribe's documentation has been prepared under my direction and personally reviewed by me in its entirety. I confirm that the note above accurately reflects all work, treatment, procedures, and medical decision making performed by me.

## 2017-09-21 NOTE — PROGRESS NOTES
Lou Meckel is a 80 y.o. female who presents for routine immunizations. Ordered flu shot per vorb dr Barbra Batista  She denies any symptoms , reactions or allergies that would exclude them from being immunized today. Risks and adverse reactions were discussed and the VIS was given to them. All questions were addressed. She was observed for 15 min post injection. There were no reactions observed.     Gisell Corbett LPN

## 2017-09-21 NOTE — PATIENT INSTRUCTIONS
Vaccine Information Statement    Influenza (Flu) Vaccine (Inactivated or Recombinant): What you need to know    Many Vaccine Information Statements are available in Romanian and other languages. See www.immunize.org/vis  Hojas de Información Sobre Vacunas están disponibles en Español y en muchos otros idiomas. Visite www.immunize.org/vis    1. Why get vaccinated? Influenza (flu) is a contagious disease that spreads around the [place] every year, usually between October and May. Flu is caused by influenza viruses, and is spread mainly by coughing, sneezing, and close contact. Anyone can get flu. Flu strikes suddenly and can last several days. Symptoms vary by age, but can include:   fever/chills   sore throat   muscle aches   fatigue   cough   headache    runny or stuffy nose    Flu can also lead to pneumonia and blood infections, and cause diarrhea and seizures in children. If you have a medical condition, such as heart or lung disease, flu can make it worse. Flu is more dangerous for some people. Infants and young children, people 72years of age and older, pregnant women, and people with certain health conditions or a weakened immune system are at greatest risk. Each year thousands of people in the [facility] die from flu, and many more are hospitalized. Flu vaccine can:   keep you from getting flu,   make flu less severe if you do get it, and   keep you from spreading flu to your family and other people. 2. Inactivated and recombinant flu vaccines    A dose of flu vaccine is recommended every flu season. Children 6 months through 6years of age may need two doses during the same flu season. Everyone else needs only one dose each flu season.        Some inactivated flu vaccines contain a very small amount of a mercury-based preservative called thimerosal. Studies have not shown thimerosal in vaccines to be harmful, but flu vaccines that do not contain thimerosal are available. There is no live flu virus in flu shots. They cannot cause the flu. There are many flu viruses, and they are always changing. Each year a new flu vaccine is made to protect against three or four viruses that are likely to cause disease in the upcoming flu season. But even when the vaccine doesnt exactly match these viruses, it may still provide some protection    Flu vaccine cannot prevent:   flu that is caused by a virus not covered by the vaccine, or   illnesses that look like flu but are not. It takes about 2 weeks for protection to develop after vaccination, and protection lasts through the flu season. 3. Some people should not get this vaccine    Tell the person who is giving you the vaccine:     If you have any severe, life-threatening allergies. If you ever had a life-threatening allergic reaction after a dose of flu vaccine, or have a severe allergy to any part of this vaccine, you may be advised not to get vaccinated. Most, but not all, types of flu vaccine contain a small amount of egg protein.  If you ever had Guillain-Barré Syndrome (also called GBS). Some people with a history of GBS should not get this vaccine. This should be discussed with your doctor.  If you are not feeling well. It is usually okay to get flu vaccine when you have a mild illness, but you might be asked to come back when you feel better. 4. Risks of a vaccine reaction    With any medicine, including vaccines, there is a chance of reactions. These are usually mild and go away on their own, but serious reactions are also possible. Most people who get a flu shot do not have any problems with it.      Minor problems following a flu shot include:    soreness, redness, or swelling where the shot was given     hoarseness   sore, red or itchy eyes   cough   fever   aches   headache   itching   fatigue  If these problems occur, they usually begin soon after the shot and last 1 or 2 days. More serious problems following a flu shot can include the following:     There may be a small increased risk of Guillain-Barré Syndrome (GBS) after inactivated flu vaccine. This risk has been estimated at 1 or 2 additional cases per million people vaccinated. This is much lower than the risk of severe complications from flu, which can be prevented by flu vaccine.  Young children who get the flu shot along with pneumococcal vaccine (PCV13) and/or DTaP vaccine at the same time might be slightly more likely to have a seizure caused by fever. Ask your doctor for more information. Tell your doctor if a child who is getting flu vaccine has ever had a seizure. Problems that could happen after any injected vaccine:      People sometimes faint after a medical procedure, including vaccination. Sitting or lying down for about 15 minutes can help prevent fainting, and injuries caused by a fall. Tell your doctor if you feel dizzy, or have vision changes or ringing in the ears.  Some people get severe pain in the shoulder and have difficulty moving the arm where a shot was given. This happens very rarely.  Any medication can cause a severe allergic reaction. Such reactions from a vaccine are very rare, estimated at about 1 in a million doses, and would happen within a few minutes to a few hours after the vaccination. As with any medicine, there is a very remote chance of a vaccine causing a serious injury or death. The safety of vaccines is always being monitored. For more information, visit: www.cdc.gov/vaccinesafety/    5. What if there is a serious reaction? What should I look for?  Look for anything that concerns you, such as signs of a severe allergic reaction, very high fever, or unusual behavior.     Signs of a severe allergic reaction can include hives, swelling of the face and throat, difficulty breathing, a fast heartbeat, dizziness, and weakness - usually within a few minutes to a few hours after the vaccination. What should I do?  If you think it is a severe allergic reaction or other emergency that cant wait, call 9-1-1 and get the person to the nearest hospital. Otherwise, call your doctor.  Reactions should be reported to the Vaccine Adverse Event Reporting System (VAERS). Your doctor should file this report, or you can do it yourself through  the VAERS web site at www.vaers. [facility].gov, or by calling 0-731.741.9878. VAERS does not give medical advice. 6. The National Vaccine Injury Compensation Program    The [facility] Vaccine Injury Compensation Program (VICP) is a federal program that was created to compensate people who may have been injured by certain vaccines. Persons who believe they may have been injured by a vaccine can learn about the program and about filing a claim by calling 6-383.636.2528 or visiting the [employer] website at www.[facility].gov/vaccinecompensation. There is a time limit to file a claim for compensation. 7. How can I learn more?  Ask your healthcare provider. He or she can give you the vaccine package insert or suggest other sources of information.  Call your local or state health department.  Contact the Centers for Disease Control and Prevention (CDC):  - Call 4-793.557.1735 (1-800-CDC-INFO) or  - Visit CDCs website at www.cdc.gov/flu    Vaccine Information Statement   Inactivated Influenza Vaccine   8/7/2015  [address] [id number]    Department of Health and Human Services  Centers for Disease Control and Prevention    Office Use Only

## 2017-09-28 NOTE — PROGRESS NOTES
Patient's identity verified with two patient identifiers (name and date of birth). 1. Have you been to the ER, urgent care clinic since your last visit? Hospitalized since your last visit? Yes, ED [facility] for body aches 8/30/17  2. Have you seen or consulted any other health care providers outside of the [address] [place] [address] since your last visit? Include any pap smears or colon screening. No    Chief Complaint   Patient presents with    Results     Lab work done for [facility] to discuss    Hypertension     3 month follow up    CHF     3 month follow up     Not fasting. Health Maintenance Due   Topic Date Due    GLAUCOMA SCREENING Q2Y   Has not had. 05/26/2017     Would like to update ACP today, \"cross out preacher\".

## 2017-09-28 NOTE — PROGRESS NOTES
HISTORY OF PRESENT ILLNESS  Amanda Jenkins is a 80 y.o. female. HPI   She presents for follow up of hypertension with chronic kidney disease, hyperlipidemia, coronary artery disease, Systolic HF (LVEF 03%) and history of prior stroke. Diet and Lifestyle: generally follows a low fat low cholesterol diet, generally follows a low sodium diet, sedentary, nonsmoker  Medication compliance: compliant all of the time  Medication side effects: none  Home BP Monitoring:  is not measured at home  Cardiovascular ROS:  She complains of palpitations, lower extremity edema, dizziness. She denies orthopnea, exertional chest pressure/discomfort, claudication, dyspnea on exertion (but admits she is not very active)    She presents for follow-up of gout. Past gout history: chronic tophaceous gout. Current gout treatment: Uloric. Side effects of current therapy: none. Progress since last visit: gouty attacks are intermittent. She reports no acute gout attacks since last clinic visit. .  She is attempting to avoid high purine foods and alcohol. Patient Active Problem List   Diagnosis Code    Spondylosis M47.9    Benign hypertension with chronic kidney disease, stage III I12.9, N18.3    Vitamin D deficiency E55.9    Glucose intolerance (impaired glucose tolerance) R73.02    Hypercholesterolemia E78.00    Chronic systolic congestive heart failure (HCC) I50.22    Ischemic cardiomyopathy I25.5    CKD (chronic kidney disease) stage 3, GFR 30-59 ml/min N18.3    Aphasia R47.01    Right sided weakness R53.1    Cerebrovascular disease, arteriosclerotic, post-stroke I67.2, Z86.73    Gout, chronic, with tophus M1A. 9XX1    Osteopenia M85.80    CAD (coronary artery disease), native coronary artery I25.10    Advance directive discussed with patient Z70.80     Past Medical History:   Diagnosis Date    CAD (coronary artery disease)     Chronic kidney disease     Congestive heart failure (HCC)     Glucose intolerance (impaired glucose tolerance)     Gout     Hypercholesterolemia     Hypertension     MI (myocardial infarction) ([facility] [address] 1980's    s/p angioplasty    Spondylosis     Stroke ([facility] [address]     Vitamin D deficiency      Past Surgical History:   Procedure Laterality Date    CARDIAC SURG PROCEDURE UNLIST      cardiac stent    HX ANGIOPLASTY  1980s    HX CORONARY STENT PLACEMENT       Social History     Social History    Marital status:      Spouse name: N/A    Number of children: N/A    Years of education: N/A     Social History Main Topics    Smoking status: Former Smoker     Years: 0.00    Smokeless tobacco: Never Used    Alcohol use No    Drug use: No    Sexual activity: No     Other Topics Concern    None     Social History Narrative     Family History   Problem Relation Age of Onset    Hypertension Mother     Stroke Sister      No Known Allergies  Current Outpatient Prescriptions   Medication Sig Dispense Refill    ULORIC 80 mg tab tablet TAKE 1 TABLET DAILY 90 Tab 0    furosemide (LASIX) 20 mg tablet       acetaminophen (TYLENOL) 325 mg tablet Take 2 Tabs by mouth every six (6) hours as needed for Pain or Fever. 40 Tab 0    multivit-min-FA-lycopen-lutein 0.4-300-250 mg-mcg-mcg tab Take  by mouth.  ferrous sulfate 325 mg (65 mg iron) cpER Take 1 Tab by mouth daily.  nitroglycerin (NITRODUR) 0.4 mg/hr 1 Patch by TransDERmal route daily.  metoprolol succinate (TOPROL-XL) 25 mg XL tablet Take 12.5 mg by mouth daily.  atorvastatin (LIPITOR) 20 mg tablet Take 1 Tab by mouth daily. 90 Tab 3    BIDIL 20-37.5 mg per tablet TAKE ONE TABLET BY MOUTH THREE TIMES DAILY 90 Tab 12    aspirin 81 mg tablet Take 81 mg by mouth daily. Review of Systems   Constitutional: Negative for malaise/fatigue and weight loss. HENT:        One week congestion and runny nose. Gastrointestinal: Negative for constipation, diarrhea and heartburn.    Musculoskeletal: Positive for back pain (once in a while). Negative for joint pain. Neurological: Negative for dizziness, tingling and focal weakness. Visit Vitals    /58 (BP 1 Location: Left arm, BP Patient Position: Sitting)    Pulse 68    Temp 96.9 °F (36.1 °C) (Oral)    Resp 16    Ht 5' 5\" (1.651 m)    Wt 160 lb (72.6 kg)    SpO2 99%    BMI 26.63 kg/m2     Physical Exam   Constitutional: She is oriented to person, place, and time. She appears well-developed and well-nourished. HENT:   Head: Normocephalic and atraumatic. Eyes: Conjunctivae are normal. Pupils are equal, round, and reactive to light. Neck: Neck supple. Carotid bruit is not present. No thyromegaly present. Cardiovascular: Normal rate, regular rhythm and normal heart sounds. PMI is not displaced. Exam reveals no gallop. No murmur heard. DP and PT pulses not palpable through edema. Pulmonary/Chest: Effort normal. She has no wheezes. She has no rhonchi. She has no rales. Abdominal: Soft. Normal appearance. She exhibits no abdominal bruit and no mass. There is no hepatosplenomegaly. There is no tenderness. Musculoskeletal: She exhibits edema (2+ to mid calf, L>R). Lymphadenopathy:     She has no cervical adenopathy. Right: No supraclavicular adenopathy present. Left: No supraclavicular adenopathy present. Neurological: She is alert and oriented to person, place, and time. No sensory deficit. Skin: Skin is warm, dry and intact. No rash noted. Psychiatric: She has a normal mood and affect. Her behavior is normal.   Nursing note and vitals reviewed.     Results for orders placed or performed during the hospital encounter of 08/30/17   CBC WITH AUTOMATED DIFF   Result Value Ref Range    WBC 3.7 3.6 - 11.0 K/uL    RBC 3.26 (L) 3.80 - 5.20 M/uL    HGB 10.1 (L) 11.5 - 16.0 g/dL    HCT 31.8 (L) 35.0 - 47.0 %    MCV 97.5 80.0 - 99.0 FL    MCH 31.0 26.0 - 34.0 PG    MCHC 31.8 30.0 - 36.5 g/dL    RDW 14.2 11.5 - 14.5 %    PLATELET 055 (L) 307 - 400 K/uL    NEUTROPHILS 37 32 - 75 %    LYMPHOCYTES 48 12 - 49 %    MONOCYTES 9 5 - 13 %    EOSINOPHILS 5 0 - 7 %    BASOPHILS 1 0 - 1 %    ABS. NEUTROPHILS 1.4 (L) 1.8 - 8.0 K/UL    ABS. LYMPHOCYTES 1.8 0.8 - 3.5 K/UL    ABS. MONOCYTES 0.4 0.0 - 1.0 K/UL    ABS. EOSINOPHILS 0.2 0.0 - 0.4 K/UL    ABS. BASOPHILS 0.0 0.0 - 0.1 K/UL   METABOLIC PANEL, COMPREHENSIVE   Result Value Ref Range    Sodium 137 136 - 145 mmol/L    Potassium 4.9 3.5 - 5.1 mmol/L    Chloride 106 97 - 108 mmol/L    CO2 27 21 - 32 mmol/L    Anion gap 4 (L) 5 - 15 mmol/L    Glucose 94 65 - 100 mg/dL    BUN 41 (H) 6 - 20 MG/DL    Creatinine 2.19 (H) 0.55 - 1.02 MG/DL    BUN/Creatinine ratio 19 12 - 20      GFR est AA 26 (L) >60 ml/min/1.73m2    GFR est non-AA 21 (L) >60 ml/min/1.73m2    Calcium 9.6 8.5 - 10.1 MG/DL    Bilirubin, total 0.4 0.2 - 1.0 MG/DL    ALT (SGPT) 14 12 - 78 U/L    AST (SGOT) 22 15 - 37 U/L    Alk. phosphatase 77 45 - 117 U/L    Protein, total 7.5 6.4 - 8.2 g/dL    Albumin 3.4 (L) 3.5 - 5.0 g/dL    Globulin 4.1 (H) 2.0 - 4.0 g/dL    A-G Ratio 0.8 (L) 1.1 - 2.2     MAGNESIUM   Result Value Ref Range    Magnesium 2.4 1.6 - 2.4 mg/dL     Lab Results   Component Value Date/Time    Cholesterol, total 154 06/22/2017 08:43 AM    HDL Cholesterol 51 06/22/2017 08:43 AM    LDL, calculated 72 06/22/2017 08:43 AM    VLDL, calculated 31 06/22/2017 08:43 AM    Triglyceride 154 06/22/2017 08:43 AM    CHOL/HDL Ratio 3.6 04/03/2013 01:40 PM       ASSESSMENT and PLAN    ICD-10-CM ICD-9-CM    1. Benign hypertension with chronic kidney disease, stage III J97.9 485.99 METABOLIC PANEL, BASIC    S70.4 585.3 MAGNESIUM   2. Coronary artery disease involving native coronary artery of native heart, angina presence unspecified I25.10 414.01    3. Chronic systolic congestive heart failure (HCC) I50.22 428.22      428.0    4. Hypercholesterolemia E78.00 272.0    5. Glucose intolerance (impaired glucose tolerance) R73.02 790.22 HEMOGLOBIN A1C WITH EAG   6. 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 Cerebrovascular disease, arteriosclerotic, post-stroke I67.2 437.0     Z86.73 V12.54    8. Screening for glaucoma Z13.5 V80.1 REFERRAL TO OPTOMETRY     Diagnoses and all orders for this visit:    1. Benign hypertension with chronic kidney disease, stage III  Blood pressure is at goal and creatinine is stable. -     METABOLIC PANEL, BASIC; Future  -     MAGNESIUM; Future    2. Coronary artery disease involving native coronary artery of native heart, angina presence unspecified    3. Chronic systolic congestive heart failure (HCC)  Stable    4. Hypercholesterolemia  Hyperlipidemia is controlled     5. Glucose intolerance (impaired glucose tolerance)  -     HEMOGLOBIN A1C WITH EAG; Future    6. 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 Cerebrovascular disease, arteriosclerotic, post-stroke    8. Screening for glaucoma  -     REFERRAL TO OPTOMETRY      Follow-up Disposition:  Return in about 4 months (around 1/28/2018) for HTN, CKD, CHF, gluc  NON-fasting lab one week prior . lab results and schedule of future lab studies reviewed with patient  reviewed diet, exercise and weight control  cardiovascular risk and specific lipid/LDL goals reviewed  I have discussed the diagnosis with the patient and the intended plan as seen in the above orders. Patient is in agreement. The patient has received an after-visit summary and questions were answered concerning future plans. I have discussed medication side effects and warnings with the patient as well.

## 2017-09-28 NOTE — PATIENT INSTRUCTIONS
Office visit in 4 months with non-fasting lab work a week before visit. Kidney Disease and High Blood Pressure: Care Instructions  Your Care Instructions  Long-term (chronic) kidney disease happens when the kidneys cannot remove waste and keep your body's fluids and chemicals in balance. Usually, the kidneys remove waste from the blood through the urine. When the kidneys are not working well, waste can build up so much that it poisons the body. Kidney disease can make you very tired. It also can cause swelling, or edema, in your legs or other areas of your body. High blood pressure is one of the major causes of chronic kidney disease. And kidney disease can also cause high blood pressure. No matter which came first, having high blood pressure damages the tiny blood vessels in the kidneys. If you have high blood pressure, it is important to lower it. There are many things you can do to lower your blood pressure, which may help slow or stop the damage to your kidneys. Follow-up care is a key part of your treatment and safety. Be sure to make and go to all appointments, and call your doctor if you are having problems. It's also a good idea to know your test results and keep a list of the medicines you take. How can you care for yourself at home? · Be safe with medicines. Take your medicines exactly as prescribed. Call your doctor if you have any problems with your medicine. You will probably need more than one medicine to lower your blood pressure. You will get more details on the specific medicines your doctor prescribes. · Work with your doctor and a dietitian to plan meals that have the right amount of nutrients for you. You will probably have to limit salt, fluids, and protein. · Stay at a healthy weight. This is very important if you put on weight around the waist. Losing even 10 pounds can help you lower your blood pressure. · Manage other health problems such as diabetes and high cholesterol. You can help lower your risk for heart disease and blood vessel problems with a healthy lifestyle along with medicines. · Do not take ibuprofen (Advil, Motrin) or naproxen (Aleve), or similar medicines, unless your doctor tells you to. They may make chronic kidney disease worse. It is okay to take acetaminophen (Tylenol). · If your doctor recommends it, get more exercise. Walking is a good choice. Bit by bit, increase the amount you walk every day. Try for at least 30 minutes on most days of the week. You also may want to swim, bike, or do other activities. · Limit or avoid alcohol. Talk to your doctor about whether you can drink any alcohol. · Do not smoke or allow others to smoke around you. If you need help quitting, talk to your doctor about stop-smoking programs and medicines. These can increase your chances of quitting for good. When should you call for help? Call 911 anytime you think you may need emergency care. For example, call if:  · You passed out (lost consciousness). · You have symptoms of a heart attack, such as:  ¨ Chest pain or pressure. ¨ Sweating. ¨ Shortness of breath. ¨ Nausea or vomiting. ¨ Pain that spreads from the chest to the neck, jaw, or one or both shoulders or arms. ¨ Dizziness or lightheadedness. ¨ A fast or uneven pulse. After calling 911, chew 1 adult-strength aspirin. Wait for an ambulance. Do not try to drive yourself. Call your doctor now or seek immediate medical care if:  · You are confused or are more tired or weak than usual.  · You bleed or have bruises. Watch closely for changes in your health, and be sure to contact your doctor if:  · You do not want to eat. · You have new swelling of your arms or feet, or swelling that you already have gets worse. · You do not get better as expected. Where can you learn more? Go to http://french-conner.info/.   Enter L183 in the search box to learn more about \"Kidney Disease and High Blood Pressure: Care Instructions. \"  Current as of: April 3, 2017  Content Version: 11.3  © 8645-3983 [employer]. Care instructions adapted under license by [employer] (which disclaims liability or warranty for this information). If you have questions about a medical condition or this instruction, always ask your healthcare professional. [place] [address] any warranty or liability for your use of this information.

## 2017-09-28 NOTE — MR AVS SNAPSHOT
Visit Information Date & Time Provider Department Dept. Phone Encounter #  
 9/28/2017  9:15 AM Cathalene Goodpasture, MD [facility] [phone] [id number] Follow-up Instructions Return in about 4 months (around 1/28/2018) for HTN, CKD, CHF, gluc  NON-fasting lab one week prior . Upcoming Health Maintenance Date Due  
 GLAUCOMA SCREENING Q2Y 5/26/2017 DTaP/Tdap/Td series (1 - Tdap) 5/13/2019* MEDICARE YEARLY EXAM 6/13/2018 *Topic was postponed. The date shown is not the original due date. Allergies as of 9/28/2017  Review Complete On: 9/28/2017 By: Cathalene Goodpasture, MD  
 No Known Allergies Current Immunizations  Reviewed on 9/21/2017 Name Date Influenza High Dose Vaccine PF 9/21/2017, 9/16/2016, 10/28/2015, 11/20/2014 Influenza Vaccine 10/9/2013 Influenza Vaccine Split 1/15/2012  5:07 PM  
 Pneumococcal Conjugate (PCV-13) 5/19/2016 TD Vaccine 5/13/2009 ZZZ-RETIRED (DO NOT USE) Pneumococcal Vaccine (Unspecified Type) 5/13/2009 Not reviewed this visit You Were Diagnosed With   
  
 Codes Comments Benign hypertension with chronic kidney disease, stage III    -  Primary ICD-10-CM: I12.9, N18.3 ICD-9-CM: 403.10, 585.3 Coronary artery disease involving native coronary artery of native heart, angina presence unspecified     ICD-10-CM: I25.10 ICD-9-CM: 414.01 Chronic systolic congestive heart failure (HCC)     ICD-10-CM: I50.22 ICD-9-CM: 428.22, 428.0 Hypercholesterolemia     ICD-10-CM: E78.00 ICD-9-CM: 272.0 Glucose intolerance (impaired glucose tolerance)     ICD-10-CM: R73.02 
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 
ICD-9-CM: 437.0, V12.54 Screening for glaucoma     ICD-10-CM: Z13.5 ICD-9-CM: V80.1 Vitals BP Pulse Temp Resp Height(growth percentile) Weight(growth percentile) 142/58 (BP 1 Location: Left arm, BP Patient Position: Sitting) 68 96.9 °F (36.1 °C) (Oral) 16 5' 5\" (1.651 m) 160 lb (72.6 kg) SpO2 BMI OB Status Smoking Status 99% 26.63 kg/m2 Postmenopausal Former Smoker Vitals History BMI and BSA Data Body Mass Index Body Surface Area  
 26.63 kg/m 2 1.82 m 2 Preferred Pharmacy Pharmacy Name Phone [address] [facility] [phone] Your Updated Medication List  
  
   
This list is accurate as of: 9/28/17  9:52 AM.  Always use your most recent med list.  
  
  
  
  
 acetaminophen 325 mg tablet Commonly known as:  TYLENOL Take 2 Tabs by mouth every six (6) hours as needed for Pain or Fever. aspirin 81 mg tablet Take 81 mg by mouth daily. atorvastatin 20 mg tablet Commonly known as:  LIPITOR Take 1 Tab by mouth daily. BIDIL 20-37.5 mg per tablet Generic drug:  isosorbide-hydrALAZINE  
TAKE ONE TABLET BY MOUTH THREE TIMES DAILY ferrous sulfate 325 mg (65 mg iron) Cper Take 1 Tab by mouth daily. furosemide 20 mg tablet Commonly known as:  LASIX  
  
 metoprolol succinate 25 mg XL tablet Commonly known as:  TOPROL-XL Take 12.5 mg by mouth daily. multivit-min-FA-lycopen-lutein 0.4-300-250 mg-mcg-mcg Tab Take  by mouth. nitroglycerin 0.4 mg/hr Commonly known as:  NITRODUR  
1 Patch by TransDERmal route daily. ULORIC 80 mg Tab tablet Generic drug:  febuxostat TAKE 1 TABLET DAILY We Performed the Following REFERRAL TO OPTOMETRY [id number] Custom] Follow-up Instructions Return in about 4 months (around 1/28/2018) for HTN, CKD, CHF, gluc  NON-fasting lab one week prior . To-Do List   
 01/28/2018 Lab:  HEMOGLOBIN A1C WITH EAG   
  
 01/28/2018 Lab:  MAGNESIUM   
  
 01/28/2018 Lab: METABOLIC PANEL, BASIC Referral Information Referral ID Referred By Referred To  
  
 [id number] [facility] [address] [place] [address] Phone: [phone] Fax: [phone] Visits Status Start Date End Date 1 New Request 9/28/17 9/28/18 If your referral has a status of pending review or denied, additional information will be sent to support the outcome of this decision. Patient Instructions Office visit in 4 months with non-fasting lab work a week before visit. Kidney Disease and High Blood Pressure: Care Instructions Your Care Instructions Long-term (chronic) kidney disease happens when the kidneys cannot remove waste and keep your body's fluids and chemicals in balance. Usually, the kidneys remove waste from the blood through the urine. When the kidneys are not working well, waste can build up so much that it poisons the body. Kidney disease can make you very tired. It also can cause swelling, or edema, in your legs or other areas of your body. High blood pressure is one of the major causes of chronic kidney disease. And kidney disease can also cause high blood pressure. No matter which came first, having high blood pressure damages the tiny blood vessels in the kidneys. If you have high blood pressure, it is important to lower it. There are many things you can do to lower your blood pressure, which may help slow or stop the damage to your kidneys. Follow-up care is a key part of your treatment and safety. Be sure to make and go to all appointments, and call your doctor if you are having problems. It's also a good idea to know your test results and keep a list of the medicines you take. How can you care for yourself at home? · Be safe with medicines. Take your medicines exactly as prescribed. Call your doctor if you have any problems with your medicine.  You will probably need more than one medicine to lower your blood pressure. You will get more details on the specific medicines your doctor prescribes. · Work with your doctor and a dietitian to plan meals that have the right amount of nutrients for you. You will probably have to limit salt, fluids, and protein. · Stay at a healthy weight. This is very important if you put on weight around the waist. Losing even 10 pounds can help you lower your blood pressure. · Manage other health problems such as diabetes and high cholesterol. You can help lower your risk for heart disease and blood vessel problems with a healthy lifestyle along with medicines. · Do not take ibuprofen (Advil, Motrin) or naproxen (Aleve), or similar medicines, unless your doctor tells you to. They may make chronic kidney disease worse. It is okay to take acetaminophen (Tylenol). · If your doctor recommends it, get more exercise. Walking is a good choice. Bit by bit, increase the amount you walk every day. Try for at least 30 minutes on most days of the week. You also may want to swim, bike, or do other activities. · Limit or avoid alcohol. Talk to your doctor about whether you can drink any alcohol. · Do not smoke or allow others to smoke around you. If you need help quitting, talk to your doctor about stop-smoking programs and medicines. These can increase your chances of quitting for good. When should you call for help? Call 911 anytime you think you may need emergency care. For example, call if: 
· You passed out (lost consciousness). · You have symptoms of a heart attack, such as: ¨ Chest pain or pressure. ¨ Sweating. ¨ Shortness of breath. ¨ Nausea or vomiting. ¨ Pain that spreads from the chest to the neck, jaw, or one or both shoulders or arms. ¨ Dizziness or lightheadedness. ¨ A fast or uneven pulse. After calling 911, chew 1 adult-strength aspirin. Wait for an ambulance. Do not try to drive yourself. Call your doctor now or seek immediate medical care if: 
· You are confused or are more tired or weak than usual. 
· You bleed or have bruises. Watch closely for changes in your health, and be sure to contact your doctor if: 
· You do not want to eat. · You have new swelling of your arms or feet, or swelling that you already have gets worse. · You do not get better as expected. Where can you learn more? Go to http://french-conner.info/. Enter U393 in the search box to learn more about \"Kidney Disease and High Blood Pressure: Care Instructions. \" Current as of: April 3, 2017 Content Version: 11.3 © 4016-2519 [employer]. Care instructions adapted under license by [employer] (which disclaims liability or warranty for this information). If you have questions about a medical condition or this instruction, always ask your healthcare professional. [facility] [address] any warranty or liability for your use of this information. Introducing [facility]! Daniel Wang introduces [facility] patient portal. Now you can access parts of your medical record, email your doctor's office, and request medication refills online. 1. In your internet browser, go to [email or web address] 2. Click on the First Time User? Click Here link in the Sign In box. You will see the New Member Sign Up page. 3. Enter your [facility] Access Code exactly as it appears below. You will not need to use this code after youve completed the sign-up process. If you do not sign up before the expiration date, you must request a new code. · [facility] Access Code: [email or web address] Expires: 11/28/2017  1:20 PM 
 
4. Enter the last four digits of your Social Security Number (xxxx) and Date of Birth (mm/dd/yyyy) as indicated and click Submit. You will be taken to the next sign-up page. 5. Create a [facility] ID.  This will be your [facility] login ID and cannot be changed, so think of one that is secure and easy to remember. 6. Create a [employer] password. You can change your password at any time. 7. Enter your Password Reset Question and Answer. This can be used at a later time if you forget your password. 8. Enter your e-mail address. You will receive e-mail notification when new information is available in [address] 9. Click Sign Up. You can now view and download portions of your medical record. 10. Click the Download Summary menu link to download a portable copy of your medical information. If you have questions, please visit the Frequently Asked Questions section of the [employer] website. Remember, [employer] is NOT to be used for urgent needs. For medical emergencies, dial 911. Now available from your iPhone and Android! Please provide this summary of care documentation to your next provider. Your primary care clinician is listed as Wilfred Laguna. If you have any questions after today's visit, please call [phone].

## 2017-09-29 NOTE — ED PROVIDER NOTES
HPI Comments: Elliot Chicas is a 80 y.o. female with PMhx significant for CKD, CHF, and HTN who presents ambulatory with a walker to the ED with cc of urinary frequency which began last night. Pt states that she has not treated her symptoms PTA. She denies any other urinary symptoms. Pt also notes chronic generalized body aches ongoing for several years. She specifically denies any fevers, chills, nausea, vomiting, chest pain, shortness of breath, headache, rash, diarrhea, sweating or weight loss. Social Hx: former Tobacco, 0 EtOH, 0 Illicit Drugs    PCP: Jose Miguel Corral MD    There are no other complaints, changes or physical findings at this time. The history is provided by the patient. No  was used. Past Medical History:   Diagnosis Date    CAD (coronary artery disease)     Chronic kidney disease     Congestive heart failure (HCC)     Glucose intolerance (impaired glucose tolerance)     Gout     Hypercholesterolemia     Hypertension     MI (myocardial infarction) ([facility] [place] [address] 1980's    s/p angioplasty    Spondylosis     Stroke ([facility] [place] [address]     Vitamin D deficiency      Past Surgical History:   Procedure Laterality Date    CARDIAC SURG PROCEDURE UNLIST      cardiac stent    HX ANGIOPLASTY  1980s    HX CORONARY STENT PLACEMENT       Family History:   Problem Relation Age of Onset    Hypertension Mother     Stroke Sister      Social History     Social History    Marital status:      Spouse name: N/A    Number of children: N/A    Years of education: N/A     Occupational History    Not on file. Social History Main Topics    Smoking status: Former Smoker     Years: 0.00    Smokeless tobacco: Never Used    Alcohol use No    Drug use: No    Sexual activity: No     Other Topics Concern    Not on file     Social History Narrative     ALLERGIES: Review of patient's allergies indicates no known allergies.     Review of Systems   Constitutional: Negative for activity change, appetite change, chills, fatigue, fever and unexpected weight change. + chronic generalized body aches   HENT: Negative. Negative for congestion, hearing loss, rhinorrhea, sneezing and voice change. Eyes: Negative. Negative for pain and visual disturbance. Respiratory: Negative. Negative for apnea, cough, choking, chest tightness and shortness of breath. Cardiovascular: Negative. Negative for chest pain and palpitations. Gastrointestinal: Negative. Negative for abdominal distention, abdominal pain, blood in stool, diarrhea, nausea and vomiting. Genitourinary: Positive for frequency. Negative for difficulty urinating, flank pain and urgency. No discharge   Musculoskeletal: Negative. Negative for arthralgias, back pain, myalgias and neck stiffness. Skin: Negative. Negative for color change and rash. Neurological: Negative. Negative for dizziness, seizures, syncope, speech difficulty, weakness, numbness and headaches. Hematological: Negative for adenopathy. Psychiatric/Behavioral: Negative. Negative for agitation, behavioral problems, dysphoric mood and suicidal ideas. The patient is not nervous/anxious. Vitals:    09/29/17 1133 09/29/17 1230 09/29/17 1330 09/29/17 1400   BP:  148/57 158/47 110/75   Pulse:  68 65    Resp:  14 16    Temp:       SpO2: 97% 96% 95% 95%   Weight:       Height:              Physical Exam   Constitutional: She is oriented to person, place, and time. She appears well-developed and well-nourished. No distress. HENT:   Head: Normocephalic and atraumatic. Mouth/Throat: Oropharynx is clear and moist. No oropharyngeal exudate. Eyes: Conjunctivae and EOM are normal. Pupils are equal, round, and reactive to light. Right eye exhibits no discharge. Left eye exhibits no discharge. Neck: Normal range of motion. Neck supple. Cardiovascular: Normal rate, regular rhythm and intact distal pulses. Exam reveals no gallop and no friction rub. No murmur heard. Pulmonary/Chest: Effort normal and breath sounds normal. No respiratory distress. She has no wheezes. She has no rales. She exhibits no tenderness. Abdominal: Soft. Bowel sounds are normal. She exhibits no distension and no mass. There is no tenderness. There is no rebound and no guarding. Musculoskeletal: Normal range of motion. She exhibits no edema. Lymphadenopathy:     She has no cervical adenopathy. Neurological: She is alert and oriented to person, place, and time. No cranial nerve deficit. Coordination normal.   Skin: Skin is warm and dry. No rash noted. No erythema. Psychiatric: She has a normal mood and affect. Nursing note and vitals reviewed. MDM  Number of Diagnoses or Management Options  Diagnosis management comments: Pt is a 80year old with chronic history of generalized body aches with new onset frequency of \"peeing\". No exacerbation or relief of symptoms. No treatment PTA. She denies urinary symptoms. Will assess for UTI with UA. Amount and/or Complexity of Data Reviewed  Clinical lab tests: ordered and reviewed  Review and summarize past medical records: yes    Patient Progress  Patient progress: stable    ED Course       Procedures    Chief Complaint   Patient presents with    Generalized Body Aches     Pt. continues with body aches and seen here 8/30 for same     11:35 AM  The patients presenting problems have been discussed, and they are in agreement with the care plan formulated and outlined with them. I have encouraged them to ask questions as they arise throughout their visit.     MEDICATIONS GIVEN:  Medications - No data to display    LABS REVIEWED:  Recent Results (from the past 24 hour(s))   URINALYSIS W/ REFLEX CULTURE    Collection Time: 09/29/17 11:48 AM   Result Value Ref Range    Color YELLOW/STRAW      Appearance CLEAR CLEAR      Specific gravity 1.012 1.003 - 1.030      pH (UA) 5.0 5.0 - 8.0      Protein NEGATIVE  NEG mg/dL    Glucose NEGATIVE  NEG mg/dL    Ketone NEGATIVE  NEG mg/dL    Bilirubin NEGATIVE  NEG      Blood NEGATIVE  NEG      Urobilinogen 0.2 0.2 - 1.0 EU/dL    Nitrites NEGATIVE  NEG      Leukocyte Esterase TRACE (A) NEG      WBC 0-4 0 - 4 /hpf    RBC 0-5 0 - 5 /hpf    Epithelial cells MODERATE (A) FEW /lpf    Bacteria NEGATIVE  NEG /hpf    UA:UC IF INDICATED CULTURE NOT INDICATED BY UA RESULT CNI      Hyaline cast 0-2 0 - 5 /lpf       VITAL SIGNS:  Patient Vitals for the past 12 hrs:   Temp Pulse Resp BP SpO2   09/29/17 1400 - - - 110/75 95 %   09/29/17 1330 - 65 16 158/47 95 %   09/29/17 1230 - 68 14 148/57 96 %   09/29/17 1133 - - - - 97 %   09/29/17 1131 - - - 170/69 -   09/29/17 1129 98.5 °F (36.9 °C) - - - -       PROGRESS NOTES:  2:01 PM  The patient states that their symptoms have resolved and they feel much better. There are no other new complaints at this time. Her questions have been answered. We are awaiting final results and those will be reviewed with them when they become available. DIAGNOSIS:    1. Urinary frequency        PLAN:  Follow-up Information     Follow up With Details Comments Contact Info    Isma Wright MD Call in 2 days As needed, If symptoms worsen [id number]  [phone]          Discharge Medication List as of 9/29/2017  2:15 PM        ED COURSE: The patients hospital course has been uncomplicated.    9:07 PM  Joy Velazquez  results have been reviewed with her. She has been counseled regarding her diagnosis. She verbally conveys understanding and agreement of the signs, symptoms, diagnosis, treatment and prognosis and additionally agrees to follow up as recommended with Dr. Isma Wright MD in 24 - 48 hours. She also agrees with the care-plan and conveys that all of her questions have been answered.   I have also put together some discharge instructions for her that include: 1) educational information regarding their diagnosis, 2) how to care for their diagnosis at home, as well a 3) list of reasons why they would want to return to the ED prior to their follow-up appointment, should their condition change. Attestation: This note is prepared by Miles Gupta, acting as Scribe for [employer] Sanjuana Liu MD    cls: The scribe's documentation has been prepared under my direction and personally reviewed by me in its entirety. I confirm that the note above accurately reflects all work, treatment, procedures, and medical decision making performed by me.

## 2017-09-29 NOTE — ED NOTES
Patient arrives for generalized body aches. Patient states that this has been going on for a few years now. Saw her PCP yesterday for a check up who stated that everything looked good but patient did not report the body aches. Patient states that she has also been urinating frequently since last night. Patient also states that \"it feels hot when I pee\". Denies n/v, fevers, cp, sob. No distress noted. Will continue to monitor.

## 2017-09-29 NOTE — ED TRIAGE NOTES
Pt. States she saw her PCP yesterday and was told everything was good but pt. Didn't mention her body aches.

## 2017-09-29 NOTE — ED NOTES
MD Tabatha Woodward reviewed discharge instructions with the patient.   The patient ambulatory out of treatment area with steady gasit

## 2017-10-23 NOTE — TELEPHONE ENCOUNTER
Patient reports urinary frequency and would like rx for abx sent to the pharmacy. Patient advised she would need to be seen, patient declined. Patient reports she gets these often and only needs rx. Advised message would be sent to the doctor for advice, but she would at least need to provided a sample. Patient declined at this time.

## 2017-10-23 NOTE — TELEPHONE ENCOUNTER
Pt called to reiterate message previously taken, she's already spoken to nurse and was advised awaiting response from doctor.  Pt expressed understanding and states will wait to hear back from nurse for further instruction

## 2017-10-23 NOTE — PROGRESS NOTES
Inform patient result is essentially normal. No UTI; antibiotic not indicated. Suggest referral to urology if she wants to address further.

## 2017-10-30 NOTE — PROGRESS NOTES
CC:   Chief Complaint   Patient presents with    Urinary Frequency       HISTORY OF PRESENT ILLNESS  Jennifer Parks is a 80 y.o. female. She complains of a 2 month history of a problem holding her urine that has been worsening over the past couple of weeks. Now has urine coming out at anytime. Is very frustrated by this. Wears Depends. Denies dysuria, urgency, frequency, hematuria, suprapubic pain, fevers, or chills. Has chronic mild low back pain. Went to the ED on 9/29/17 about her urinary problems. UA was essentially negative and did not reflex to a urine culture. Urine dip done 10/23/17 and again today were both negative. Patient Active Problem List   Diagnosis Code    Spondylosis M47.9    Benign hypertension with chronic kidney disease, stage III I12.9, N18.3    Vitamin D deficiency E55.9    Glucose intolerance (impaired glucose tolerance) R73.02    Hypercholesterolemia E78.00    Chronic systolic congestive heart failure (HCC) I50.22    Ischemic cardiomyopathy I25.5    CKD (chronic kidney disease) stage 3, GFR 30-59 ml/min N18.3    Aphasia R47.01    Right sided weakness R53.1    Cerebrovascular disease, arteriosclerotic, post-stroke I67.2, Z86.73    Gout, chronic, with tophus M1A. 9XX1    Osteopenia M85.80    CAD (coronary artery disease), native coronary artery I25.10    Advance directive discussed with patient Z70.80     Past Medical History:   Diagnosis Date    CAD (coronary artery disease)     Chronic kidney disease     Congestive heart failure (HCC)     Glucose intolerance (impaired glucose tolerance)     Gout     Hypercholesterolemia     Hypertension     MI (myocardial infarction) 1980's    s/p angioplasty    Spondylosis     Stroke (HCC)     Vitamin D deficiency      No Known Allergies     Current Outpatient Prescriptions   Medication Sig Dispense Refill    ULORIC 80 mg tab tablet TAKE 1 TABLET DAILY 90 Tab 0    furosemide (LASIX) 20 mg tablet Take 20 mg by mouth daily.  acetaminophen (TYLENOL) 325 mg tablet Take 2 Tabs by mouth every six (6) hours as needed for Pain or Fever. 40 Tab 0    multivit-min-FA-lycopen-lutein 0.4-300-250 mg-mcg-mcg tab Take  by mouth.  ferrous sulfate 325 mg (65 mg iron) cpER Take 1 Tab by mouth daily.  nitroglycerin (NITRODUR) 0.4 mg/hr 1 Patch by TransDERmal route daily.  metoprolol succinate (TOPROL-XL) 25 mg XL tablet Take 12.5 mg by mouth daily.  atorvastatin (LIPITOR) 20 mg tablet Take 1 Tab by mouth daily. 90 Tab 3    BIDIL 20-37.5 mg per tablet TAKE ONE TABLET BY MOUTH THREE TIMES DAILY 90 Tab 12    aspirin 81 mg tablet Take 81 mg by mouth daily. PHYSICAL EXAM  Visit Vitals    /67    Pulse 84    Temp 98.1 °F (36.7 °C) (Oral)    Resp 18    Ht 5' 2\" (1.575 m)    Wt 147 lb (66.7 kg)    SpO2 98%    BMI 26.89 kg/m2       General: Well-developed and well-nourished, no distress. Ambulates with a walker. HEENT:  Head normocephalic/atraumatic, no scleral icterus  Lungs:  Clear to ausculation bilaterally. Good air movement. Heart:  Regular rate and rhythm, normal S1 and S2, no murmur, gallop, or rub. Back: No CVA tenderness. Neurological: Alert and oriented. Psychiatric: Normal mood and affect. Behavior is normal.     Results for orders placed or performed in visit on 10/30/17   AMB POC URINALYSIS DIP STICK AUTO W/O MICRO   Result Value Ref Range    Color (UA POC) Yellow     Clarity (UA POC) Clear     Glucose (UA POC) Negative Negative    Bilirubin (UA POC) Negative Negative    Ketones (UA POC) Negative Negative    Specific gravity (UA POC) 1.010 1.001 - 1.035    Blood (UA POC) Negative Negative    pH (UA POC) 5.5 4.6 - 8.0    Protein (UA POC) Negative Negative mg/dL    Urobilinogen (UA POC) 0.2 mg/dL 0.2 - 1    Nitrites (UA POC) Negative Negative    Leukocyte esterase (UA POC) Negative Negative         ASSESSMENT AND PLAN    ICD-10-CM ICD-9-CM    1.  Urge incontinence of urine N39.41 788.31 REFERRAL TO UROLOGY   2. Urinary frequency R35.0 788.41 AMB POC URINALYSIS DIP STICK AUTO W/O MICRO       Diagnoses and all orders for this visit:    1. Urge incontinence of urine  Likely has mixed urge and functional incontinence. I am not starting her on an anti-cholinergic medication for incontinence based on her age; anticholinergic medications for incontinence are on the [facility] List Due to increasing risk of falls in the elderly. She may be a candidate for minimally invasive bladder surgery. I  placed a referral for Dr. Natalia Alberto or Dr. [facility]ag[facility]), but had our referral coordinator, Marily Brown, also call [facility]. Ms. Vashti Wolf was able to get the patient an appointment to be seen tomorrow at [facility].  -     REFERRAL TO UROLOGY    2. Urinary frequency  -     AMB POC URINALYSIS DIP STICK AUTO W/O MICRO      Follow-up Disposition:  Return if symptoms worsen or fail to improve, for Scheduled appt 1/23/18 with Dr. Angie Rodriguez. Provided patient and/or family with advanced directive information and answered pertinent questions. Encouraged patient to provide a copy of advanced directive to the office when available. I have discussed the diagnosis with the patient and the intended plan as seen in the above orders. Patient is in agreement. The patient has received an after-visit summary and questions were answered concerning future plans. I have discussed medication side effects and warnings with the patient as well.

## 2017-10-30 NOTE — PROGRESS NOTES
Reviewed record  In preparation for visit and have obtained necessary documentation. 1. Have you been to the ER, urgent care clinic since your last visit? Hospitalized since your last visit?seen at [facility] on 10/9/17  2. Have you seen or consulted any other health care providers outside of the [address] [place] [address] since your last visit? Include any pap smears or colon screening. no  Advanced directives: on file  Patients vital signs discussed with physician. Per Dr. Gamal Pritchard verbal order read back orders placed for urine dip.

## 2017-10-30 NOTE — MR AVS SNAPSHOT
Visit Information Date & Time Provider Department Dept. Phone Encounter #  
 10/30/2017  9:30 AM Yamini Bullock Ragland [phone] [id number] Follow-up Instructions Return if symptoms worsen or fail to improve, for Scheduled appt 1/23/18 with Dr. Gaurav Jameson. Your Appointments 1/16/2018  9:30 AM  
[facility]: NON-fasting lab  
 [address] [address] [phone] [id number] [facility]  
  
    
 1/23/2018  8:15 AM  
ROUTINE CARE with Matt Elliott MD  
[facility] Appt Note: 4mth f/u; HTN, CKD, CHF, gluc  NON-fasting lab one week prior [address] [address] [phone]  
  
   
 [address] [facility] [address] Upcoming Health Maintenance Date Due  
 GLAUCOMA SCREENING Q2Y 5/26/2017 DTaP/Tdap/Td series (1 - Tdap) 5/13/2019* MEDICARE YEARLY EXAM 6/13/2018 *Topic was postponed. The date shown is not the original due date. Allergies as of 10/30/2017  Review Complete On: 10/30/2017 By: Kiet Leija MD  
 No Known Allergies Current Immunizations  Reviewed on 9/21/2017 Name Date Influenza High Dose Vaccine PF 9/21/2017, 9/16/2016, 10/28/2015, 11/20/2014 Influenza Vaccine 10/9/2013 Influenza Vaccine Split 1/15/2012  5:07 PM  
 Pneumococcal Conjugate (PCV-13) 5/19/2016 TD Vaccine 5/13/2009 ZZZ-RETIRED (DO NOT USE) Pneumococcal Vaccine (Unspecified Type) 5/13/2009 Not reviewed this visit You Were Diagnosed With   
  
 Codes Comments Urge incontinence of urine    -  Primary ICD-10-CM: N39.41 
ICD-9-CM: 788.31 Urinary frequency     ICD-10-CM: R35.0 ICD-9-CM: 788.41 Vitals BP Pulse Temp Resp Height(growth percentile) Weight(growth percentile) 158/67 84 98.1 °F (36.7 °C) (Oral) 18 5' 2\" (1.575 m) 147 lb (66.7 kg) SpO2 BMI OB Status Smoking Status 98% 26.89 kg/m2 Postmenopausal Former Smoker Vitals History BMI and BSA Data Body Mass Index Body Surface Area  
 26.89 kg/m 2 1.71 m 2 Preferred Pharmacy Pharmacy Name Phone [facility] [address] [facility], [facility] [address] [place] [phone] Your Updated Medication List  
  
   
This list is accurate as of: 10/30/17 10:02 AM.  Always use your most recent med list.  
  
  
  
  
 acetaminophen 325 mg tablet Commonly known as:  TYLENOL Take 2 Tabs by mouth every six (6) hours as needed for Pain or Fever. aspirin 81 mg tablet Take 81 mg by mouth daily. atorvastatin 20 mg tablet Commonly known as:  LIPITOR Take 1 Tab by mouth daily. BIDIL 20-37.5 mg per tablet Generic drug:  isosorbide-hydrALAZINE  
TAKE ONE TABLET BY MOUTH THREE TIMES DAILY ferrous sulfate 325 mg (65 mg iron) Cper Take 1 Tab by mouth daily. furosemide 20 mg tablet Commonly known as:  LASIX Take 20 mg by mouth daily. metoprolol succinate 25 mg XL tablet Commonly known as:  TOPROL-XL Take 12.5 mg by mouth daily. multivit-min-FA-lycopen-lutein 0.4-300-250 mg-mcg-mcg Tab Take  by mouth. nitroglycerin 0.4 mg/hr Commonly known as:  NITRODUR  
1 Patch by TransDERmal route daily. ULORIC 80 mg Tab tablet Generic drug:  febuxostat TAKE 1 TABLET DAILY We Performed the Following AMB POC URINALYSIS DIP STICK AUTO W/O MICRO [70169 CPT(R)] REFERRAL TO UROLOGY [FGV894 Custom] Comments:  
 Evaluation and management of urinary incontinence in a 80year old woman. If you cannot get in soon to see Dr. Rafat Varghese, try to see Dr. Jolie Olivo. Follow-up Instructions Return if symptoms worsen or fail to improve, for Scheduled appt 1/23/18 with Dr. Rajendra Dong. Referral Information Referral ID Referred By Referred To  
  
 [id number] [facility], [address], [place]   
   [address] [facility], [address] Phone: [phone] Fax: [phone] Visits Status Start Date End Date 1 Authorized 10/30/17 10/30/18 If your referral has a status of pending review or denied, additional information will be sent to support the outcome of this decision. Introducing [facility]! Shobha Wade introduces [employer] patient portal. Now you can access parts of your medical record, email your doctor's office, and request medication refills online. 1. In your internet browser, go to [email or web address] 2. Click on the First Time User? Click Here link in the Sign In box. You will see the New Member Sign Up page. 3. Enter your [employer] Access Code exactly as it appears below. You will not need to use this code after youve completed the sign-up process. If you do not sign up before the expiration date, you must request a new code. · [employer] Access Code: [email or web address] Expires: 11/28/2017  1:20 PM 
 
4. Enter the last four digits of your Social Security Number (xxxx) and Date of Birth (mm/dd/yyyy) as indicated and click Submit. You will be taken to the next sign-up page. 5. Create a [employer] ID. This will be your [employer] login ID and cannot be changed, so think of one that is secure and easy to remember. 6. Create a [employer] password. You can change your password at any time. 7. Enter your Password Reset Question and Answer. This can be used at a later time if you forget your password. 8. Enter your e-mail address. You will receive e-mail notification when new information is available in [address] 9. Click Sign Up. You can now view and download portions of your medical record. 10. Click the Download Summary menu link to download a portable copy of your medical information. If you have questions, please visit the Frequently Asked Questions section of the [employer] website. Remember, [email or web address] is NOT to be used for urgent needs. For medical emergencies, dial 911. Now available from your iPhone and Android! Please provide this summary of care documentation to your next provider. Your primary care clinician is listed as Vandana Felix. If you have any questions after today's visit, please call [phone].

## 2017-11-13 PROBLEM — R65.20 SEVERE SEPSIS (HCC): Status: ACTIVE | Noted: 2017-01-01

## 2017-11-13 PROBLEM — J18.9 CAP (COMMUNITY ACQUIRED PNEUMONIA): Status: ACTIVE | Noted: 2017-01-01

## 2017-11-13 PROBLEM — A41.9 SEVERE SEPSIS (HCC): Status: ACTIVE | Noted: 2017-01-01

## 2017-11-13 NOTE — PROGRESS NOTES
Pharmacy Clarification of Prior to Admission Medication Regimen     The patient was NOT interviewed regarding clarification of the prior to admission medication regimen and was NOT questioned regarding use of any other inhalers, topical products, over the counter medications, herbal medications, vitamin products or ophthalmic/nasal/otic medication use. Information Obtained From: RQ Query, Outpatient Pharmacy    Pertinent Pharmacy Findings:   Patient was unable to assess, but was able to communicate that she was not aware of her current medication regimen. No family members/caregivers were present at the time of interview to provide clarification of medications.  Patient's preferred outpatient pharmacy was contacted in an effort to obtain information regarding patient's medications. They were able to confirm that the patient uses nitroglycerin (NITRODUR) 0.4 mg/hr, but they were unable to confirm any other medications.  Per RQ Query, the patient uses mail order services to obtain BIDIL 20-37.5 mg per tablet, atorvastatin (LIPITOR) 20 mg tablet, febuxostat (ULORIC) 80 mg tab tablet, furosemide (LASIX) 20 mg tablet, and metoprolol succinate (TOPROL-XL) 25 mg XL tablet. PTA medication list was NOT corrected:    Prior to Admission Medications   Prescriptions Last Dose Informant Patient Reported? Taking? BIDIL 20-37.5 mg per tablet  Other No Yes   Sig: TAKE ONE TABLET BY MOUTH THREE TIMES DAILY   acetaminophen (TYLENOL) 325 mg tablet   No Yes   Sig: Take 2 Tabs by mouth every six (6) hours as needed for Pain or Fever. aspirin 81 mg tablet   Yes Yes   Sig: Take 81 mg by mouth daily. atorvastatin (LIPITOR) 20 mg tablet  Other No Yes   Sig: Take 1 Tab by mouth daily. febuxostat (ULORIC) 80 mg tab tablet  Other Yes Yes   Sig: Take 80 mg by mouth daily. ferrous sulfate 325 mg (65 mg iron) cpER  Other Yes Yes   Sig: Take 1 Tab by mouth daily.    furosemide (LASIX) 20 mg tablet  Other Yes Yes   Sig: Take 20 mg by mouth daily. metoprolol succinate (TOPROL-XL) 25 mg XL tablet  Other Yes Yes   Sig: Take 12.5 mg by mouth daily. multivit-min-FA-lycopen-lutein 0.4-300-250 mg-mcg-mcg tab   Yes Yes   Sig: Take 1 Tab by mouth daily. nitroglycerin (NITRODUR) 0.4 mg/hr  Other Yes Yes   Si Patch by TransDERmal route daily. Facility-Administered Medications: None          Thank you,  Celina Anne. D Candidate 2018  NIK Pulliam

## 2017-11-13 NOTE — PROGRESS NOTES
SHIFT SUMMARY:    1330: Patient arrived to room. No complaints of pain at this time. Vitals stable. Primary Nurse Sariah Allen and Nasra Landrum, RN performed a dual skin assessment on this patient No impairment noted (Patient has area on sacrum that is pink but blanches, no open areas. Foam applied. Patient also has some dry patches on lower left extremity. Elton score is 18      1900: Bedside and Verbal shift change report given to  (oncoming nurse) by William García RN (offgoing nurse). Report included the following information SBAR, Kardex, Intake/Output, MAR, Recent Results and Cardiac Rhythm NSR. [address] NURSING NOTE   Admission Date 11/13/2017   Admission Diagnosis Severe sepsis ([address]  CAP (community acquired pneumonia)   Consults IP CONSULT TO HOSPITALIST      Cardiac Monitoring [x] Yes [] No      Purposeful Hourly Rounding [x] Yes    Mariajose Score Total Score: 3   Mariajose score 3 or > [x] Bed Alarm [] Avasys [] 1:1 sitter [] Patient refused (Place signed refusal form in chart)   Elton Score Elton Score: 18   Elton score 14 or < [] PMT consult [] Wound Care consult    []  Specialty bed  [] Nutrition consult      Influenza Vaccine Received Flu Vaccine for Current Season (usually Sept-March): Yes           Oxygen needs? [] Room air Oxygen @  []1L    [x]2L    []3L   []4L    []5L   []6L     Use home O2? [] Yes [x] No  Perform O2 challenge test using  smartphrase (.Homeoxygen)      Last bowel movement Last Bowel Movement Date:  (patient unsure)      Urinary Catheter             LDAs               Peripheral IV 11/13/17 Right Antecubital (Active)   Site Assessment Clean, dry, & intact 11/13/2017  2:06 PM   Phlebitis Assessment 0 11/13/2017  2:06 PM   Infiltration Assessment 0 11/13/2017  2:06 PM   Dressing Status Clean, dry, & intact 11/13/2017  2:06 PM   Dressing Type Tape;Transparent 11/13/2017  2:06 PM   Hub Color/Line Status Pink; Infusing;Patent 11/13/2017  2:06 PM                         Readmission Risk Assessment Tool Score High Risk            37       Total Score        3 Has Seen PCP in Last 6 Months (Yes=3, No=0)    4 IP Visits Last 12 Months (1-3=4, 4=9, >4=11)    5 Pt. Coverage (Medicare=5 , Medicaid, or Self-Pay=4)    25 Charlson Comorbidity Score (Age + Comorbid Conditions)        Criteria that do not apply:    . Living with Significant Other. Assisted Living. LTAC. SNF.  or   Rehab    Patient Length of Stay (>5 days = 3)       Expected Length of Stay - - -   Actual Length of Stay 0

## 2017-11-13 NOTE — ED PROVIDER NOTES
[facility] [address]  EMERGENCY DEPARTMENT HISTORY AND PHYSICAL EXAM       Date of Service: 11/13/2017   Patient Name: Barbra Vinson   YOB: 1927  Medical Record Number: [id number]    History of Presenting Illness     Chief Complaint   Patient presents with    Shortness of Breath     onset yesterday, +cough        History Provided By:  patient    Additional History:   Barbra Vinson is a 80 y.o. female who presents ambulatory to the ED with cc of SOB, productive (yellow) cough and hemoptysis x two days. Pt reports being more SOB with exertion. She denies being on O2 at home; however, pt was 89% SPO2 on RA. She states she has R sided CP. She also notes having increased leg swelling. Pt reports having chronic constipation and denies any changes in baseline. She denies any odorous urine and urinary incontinence. Pt states she wears pampers. Pt specifically denies any fevers, N/V/D, dysuria, numbness, weakness, and rashes. PMHx & Surgical hx: HTN, HLD, CAD, MI, CHF, CVA, CABG, angioplasty, coronary stent  Social Hx: -(former) Tobacco, - EtOH, - Illicit Drugs    There are no other complaints, changes or physical findings at this time.     Primary Care Provider: Karen Samson MD     Past History     Past Medical History:   Past Medical History:   Diagnosis Date    CAD (coronary artery disease)     Chronic kidney disease     Congestive heart failure (HCC)     Glucose intolerance (impaired glucose tolerance)     Gout     Hypercholesterolemia     Hypertension     MI (myocardial infarction) 18's    s/p angioplasty    Spondylosis     Stroke ([facility] [address]     Vitamin D deficiency         Past Surgical History:   Past Surgical History:   Procedure Laterality Date    CARDIAC SURG PROCEDURE UNLIST      cardiac stent    HX ANGIOPLASTY  1980s    HX CORONARY STENT PLACEMENT          Family History:   Family History   Problem Relation Age of Onset    Hypertension Mother    [facility] Stroke Sister         Social History:   Social History   Substance Use Topics    Smoking status: Former Smoker     Years: 0.00    Smokeless tobacco: Never Used    Alcohol use No        Allergies:   No Known Allergies      Review of Systems   Review of Systems   Constitutional: Negative. Negative for chills, fatigue and fever. HENT: Negative. Negative for rhinorrhea and sore throat. Eyes: Negative. Negative for visual disturbance. Respiratory: Positive for cough and shortness of breath. Negative for chest tightness and wheezing. Positive for hemoptysis. Cardiovascular: Positive for chest pain (R sided) and leg swelling (BL). Negative for palpitations. Gastrointestinal: Positive for constipation (chronic). Negative for abdominal pain, blood in stool, diarrhea, nausea and vomiting. Endocrine: Negative. Genitourinary: Negative. Negative for difficulty urinating, dysuria and hematuria. Negative for odorous urine. Negative for urinary incontinence. Musculoskeletal: Negative. Negative for arthralgias and myalgias. Skin: Negative. Negative for rash. Allergic/Immunologic: Negative. Negative for environmental allergies and food allergies. Neurological: Negative. Negative for weakness, numbness and headaches. Hematological: Negative. Psychiatric/Behavioral: Negative. Negative for suicidal ideas. Physical Exam  Physical Exam   Constitutional: She is oriented to person, place, and time. She appears well-developed and well-nourished. No distress. HENT:   Head: Normocephalic and atraumatic. Mouth/Throat: Oropharynx is clear and moist.   Eyes: Conjunctivae and EOM are normal.   Neck: Neck supple. No JVD present. No tracheal deviation present. Cardiovascular: Regular rhythm and intact distal pulses. Tachycardia present. Exam reveals no gallop and no friction rub. No murmur heard. Pulmonary/Chest: Effort normal. No stridor. Tachypnea noted.  No respiratory distress. Decreased air movement BL with expiratory wheezes. Crackles in R lower lobe. Pt on 4L O2 NC. Abdominal: Soft. Bowel sounds are normal. She exhibits no distension and no mass. There is no tenderness. There is no guarding. Musculoskeletal: Normal range of motion. She exhibits no edema or tenderness. No deformity. Trace pitting edema BL. Neurological: She is alert and oriented to person, place, and time. She has normal strength. No focal deficits   Skin: Skin is warm, dry and intact. No rash noted. Psychiatric: She has a normal mood and affect. Her behavior is normal. Judgment and thought content normal.   Nursing note and vitals reviewed. Medical Decision Making   I am the first provider for this patient. I reviewed the vital signs, available nursing notes, past medical history, past surgical history, family history and social history. Old Medical Records: Old medical records. Provider Notes:   Pt presenting with fever, tachycardia, tachypnea and hypoxia. DDx includes pneumonia, pulmonary edema, ACS, sepsis, uti, electrolyte abnormality, dehydration, lizz, acs. Will check labs, blood and urine cultures, CXR, cardiac enzymes, ekg. Will treat with antipyretics, antibiotics. Admit. Critical Care Time:   CRITICAL CARE NOTE :  IMPENDING DETERIORATION -Respiratory, Cardiovascular and Metabolic  ASSOCIATED RISK FACTORS - Hypotension, Shock, Hypoxia, Metabolic changes and Vascular Compromise  MANAGEMENT- Bedside Assessment and Supervision of Care  INTERPRETATION -  Xrays, ECG and Blood Pressure  INTERVENTIONS - hemodynamic mngmt, vascular control and Metobolic interventions  CASE REVIEW - Hospitalist, Nursing and Family  TREATMENT RESPONSE -Improved  PERFORMED BY - Self    NOTES   :  I have spent 60 minutes of critical care time involved in lab review, consultations with specialist, family decision- making, bedside attention and documentation.  During this entire length of time I was immediately available to the patient . Janey Julian      ED Course:  8:42 AM   Initial assessment performed. The patients presenting problems have been discussed, and they are in agreement with the care plan formulated and outlined with them. I have encouraged them to ask questions as they arise throughout their visit. Progress Notes:   9:40 AM  Updated patient on her imaging results. Pt states she is feeling a little better after her breathing treatment. CONSULT NOTE:   10:56 AM  Costa Francois DO spoke with Dr. Elvia Rice,  Specialty: hospitalist  Discussed pt's hx, disposition, and available diagnostic and imaging results. Reviewed care plans. Consultant agrees with plans as outlined. Dr. Benjie Wilkes will admit pt. Written by Deon Arceo ED Scribe, as dictated by Costa Francois DO. Diagnostic Study Results   Labs -  Recent Results (from the past 12 hour(s))   CBC WITH AUTOMATED DIFF    Collection Time: 11/13/17  8:31 AM   Result Value Ref Range    WBC 13.8 (H) 3.6 - 11.0 K/uL    RBC 3.44 (L) 3.80 - 5.20 M/uL    HGB 10.0 (L) 11.5 - 16.0 g/dL    HCT 32.8 (L) 35.0 - 47.0 %    MCV 95.3 80.0 - 99.0 FL    MCH 29.1 26.0 - 34.0 PG    MCHC 30.5 30.0 - 36.5 g/dL    RDW 14.9 (H) 11.5 - 14.5 %    PLATELET 259 748 - 055 K/uL    NEUTROPHILS 77 (H) 32 - 75 %    LYMPHOCYTES 15 12 - 49 %    MONOCYTES 8 5 - 13 %    EOSINOPHILS 0 0 - 7 %    BASOPHILS 0 0 - 1 %    ABS. NEUTROPHILS 10.6 (H) 1.8 - 8.0 K/UL    ABS. LYMPHOCYTES 2.0 0.8 - 3.5 K/UL    ABS. MONOCYTES 1.1 (H) 0.0 - 1.0 K/UL    ABS. EOSINOPHILS 0.0 0.0 - 0.4 K/UL    ABS.  BASOPHILS 0.0 0.0 - 0.1 K/UL   METABOLIC PANEL, COMPREHENSIVE    Collection Time: 11/13/17  8:31 AM   Result Value Ref Range    Sodium 135 (L) 136 - 145 mmol/L    Potassium 4.5 3.5 - 5.1 mmol/L    Chloride 99 97 - 108 mmol/L    CO2 27 21 - 32 mmol/L    Anion gap 9 5 - 15 mmol/L    Glucose 128 (H) 65 - 100 mg/dL    BUN 53 (H) 6 - 20 MG/DL    Creatinine 2.65 (H) 0.55 - 1.02 MG/DL    BUN/Creatinine ratio 20 12 - 20      GFR est AA 21 (L) >60 ml/min/1.73m2    GFR est non-AA 17 (L) >60 ml/min/1.73m2    Calcium 9.2 8.5 - 10.1 MG/DL    Bilirubin, total 1.1 (H) 0.2 - 1.0 MG/DL    ALT (SGPT) 23 12 - 78 U/L    AST (SGOT) 29 15 - 37 U/L    Alk. phosphatase 88 45 - 117 U/L    Protein, total 8.8 (H) 6.4 - 8.2 g/dL    Albumin 2.9 (L) 3.5 - 5.0 g/dL    Globulin 5.9 (H) 2.0 - 4.0 g/dL    A-G Ratio 0.5 (L) 1.1 - 2.2     LACTIC ACID    Collection Time: 11/13/17  8:31 AM   Result Value Ref Range    Lactic acid 2.9 (HH) 0.4 - 2.0 MMOL/L   CK W/ CKMB & INDEX    Collection Time: 11/13/17  8:31 AM   Result Value Ref Range     26 - 192 U/L    CK - MB 2.7 <3.6 NG/ML    CK-MB Index 1.9 0 - 2.5     NT-PRO BNP    Collection Time: 11/13/17  8:31 AM   Result Value Ref Range    NT pro-BNP 7032 (H) 0 - 450 PG/ML   TROPONIN I    Collection Time: 11/13/17  8:31 AM   Result Value Ref Range    Troponin-I, Qt. 0.15 (H) <0.05 ng/mL   EKG, 12 LEAD, INITIAL    Collection Time: 11/13/17  8:49 AM   Result Value Ref Range    Ventricular Rate 111 BPM    Atrial Rate 111 BPM    P-R Interval 216 ms    QRS Duration 86 ms    Q-T Interval 316 ms    QTC Calculation (Bezet) 429 ms    Calculated P Axis 35 degrees    Calculated R Axis -30 degrees    Calculated T Axis 96 degrees    Diagnosis       Sinus tachycardia with 1st degree AV block  Left axis deviation  Minimal voltage criteria for LVH, may be normal variant  Inferior infarct (cited on or before 13-NOV-2010)  Anteroseptal infarct (cited on or before 13-NOV-2010)  Abnormal ECG  When compared with ECG of 30-AUG-2017 12:53,  Vent.  rate has increased BY  45 BPM  Serial changes of Anteroseptal infarct present         Radiologic Studies -  The following have been ordered and reviewed:  XR CHEST PORT   Final Result        CT Results  (Last 48 hours)    None        CXR Results  (Last 48 hours)               11/13/17 0901  XR CHEST PORT Final result    Impression:  IMPRESSION: Right midlung and right basilar patchy airspace consolidation. Narrative:  INDICATION: Chest pain. Cough for several days, productive. Portable AP semiupright view of the chest.       Direct comparison made to prior chest x-ray dated August 30, 2017. Cardiomediastinal silhouette is stable. Lungs are hypoinflated. There is right   midlung and right basilar patchy airspace consolidation. No pleural fluid is   visualized. Left lung is grossly clear. There is no pneumothorax. Vital Signs-Reviewed the patient's vital signs. Patient Vitals for the past 12 hrs:   Temp Pulse Resp BP SpO2   11/13/17 0823 - - - - 96 %   11/13/17 0821 (!) 102.8 °F (39.3 °C) (!) 126 30 188/81 (!) 86 %       Medications Given in the ED:  Medications   sodium chloride 0.9 % bolus infusion 2,091 mL (2,091 mL IntraVENous New Bag 11/13/17 1016)   azithromycin (ZITHROMAX) 500 mg in 0.9% sodium chloride (MBP/ADV) 250 mL (not administered)   albuterol-ipratropium (DUO-NEB) 2.5 MG-0.5 MG/3 ML (3 mL Nebulization Given 11/13/17 0900)   acetaminophen (TYLENOL) tablet 650 mg (650 mg Oral Given 11/13/17 0900)   morphine injection 4 mg (4 mg IntraVENous Given 11/13/17 1016)   cefTRIAXone (ROCEPHIN) 2 g in 0.9% sodium chloride (MBP/ADV) 50 mL (2 g IntraVENous New Bag 11/13/17 1038)         EKG interpretation: (Preliminary) 8:49 AM  Rhythm: sinus tachycardia with 1st degree AV block; and regular . Rate (approx.): 111 bpm; Axis: left axis deviation; NM interval: normal; QRS interval: normal ; ST/T wave: non specific ST changes;  Written by Claretha Lesches, ED [place], as dictated by Mao Cano DO    Diagnosis   Clinical Impression:   1. Community acquired pneumonia of right lower lobe of lung ([address]    2. Sepsis, due to unspecified organism ([address]    3. Acute respiratory failure with hypoxia ([address]         Plan: Admit to hospital.     Disposition Note:  10:56 AM  Patient is being admitted to the hospital by Dr. Tasneem Perez.   The results of their tests and reasons for their admission have been discussed with them and/or available family. They convey agreement and understanding for the need to be admitted and for their admission diagnosis. Consultation has been made with the inpatient physician specialist for hospitalization. _______________________________     Attestations: This note is prepared by Claretha Lesches acting as Scribe for Jose Nelson DO. The scribe's documentation has been prepared under my direction and personally reviewed by me in its entirety. I confirm that the note above accurately reflects all work, treatment, procedures, and medical decision making performed by me.   Jose Nelson DO    _______________________________

## 2017-11-13 NOTE — PROGRESS NOTES
TRANSFER - IN REPORT:    Verbal report received from Sunday Slade RN(name) on Herrera Eng  being received from ED(unit) for routine progression of care      Report consisted of patients Situation, Background, Assessment and   Recommendations(SBAR). Information from the following report(s) SBAR, Kardex, Intake/Output, MAR, Recent Results and Alarm Parameters  was reviewed with the receiving nurse. Opportunity for questions and clarification was provided. Assessment completed upon patients arrival to unit and care assumed.

## 2017-11-13 NOTE — ED NOTES
TRANSFER - OUT REPORT:    Verbal report given to Serenity RN(name) on Ashkan Cordova  being transferred to [facility](unit) for routine progression of care       Report consisted of patients Situation, Background, Assessment and   Recommendations(SBAR). Information from the following report(s) SBAR, ED Summary, [facility] and Recent Results was reviewed with the receiving nurse. Lines:   Peripheral IV 11/13/17 Right Antecubital (Active)   Site Assessment Clean, dry, & intact 11/13/2017  8:44 AM   Phlebitis Assessment 0 11/13/2017  8:44 AM   Infiltration Assessment 0 11/13/2017  8:44 AM   Dressing Status Clean, dry, & intact 11/13/2017  8:44 AM   Dressing Type Tape;Transparent 11/13/2017  8:44 AM        Opportunity for questions and clarification was provided.       Patient transported with:

## 2017-11-14 NOTE — H&P
Hospitalist Admission Note    NAME: Alvaro Chaudhari   :  1927   MRN:  [id number]     Date/Time:  2017 7:04 PM    Patient PCP: Deb Delgado MD  ________________________________________________________________________    My assessment of this patient's clinical condition and my plan of care is as follows. Assessment / Plan:  Severe sepsis due to cap  -there is clinical and radiological evidence  of pneumonia  -blood cx have been sent in ed. Will check legionella and streptococcal antigen. Start ceftriaxone and azithromycin. Repeat lactic acid in 6 hours. Cbc in am. Cautious iv fluids due to hx of CHF. Acute kidney injury on ckd 3 likely prerenal  -base line cr of around 1.9-2.0. Currently cr is around 2.65  -hold lasix and now on hydration with NS  -check bmp in am. Monitor urine out put. HTN  -on bidil    Dyslipidemia  -on statin    Hx of CAD  -on asa    Hx of Diastolic CHF  -lasix held due to [facility] and being  Hydrated  due to sepsis  -consider resuming lasix soon    Mild elevation of troponin likely demand ischemia  -pt has no anginal type of chest pain.  -ekg shows no new stt changes  -check troponin x 3. Code Status: full   Surrogate Decision Maker: Michelle Gutierrez     DVT Prophylaxis: heparin  GI Prophylaxis: not indicated    Baseline: independent        Subjective:   CHIEF COMPLAINT: sob and cough. HISTORY OF PRESENT ILLNESS:     Oakley Homans is a 80 y.o.  AA  female with cad and ckd who presents with sob, cough and phlegm since  The alst 3 days. She reports sob is more exertional with no orthopnea or PND. She also reports cough  With yellow phlegm which is non bloody. She also reports rt sided chest pain, 3/10, increased with breathing. She reports having fever and chills at home. She denies exposure to sick contact. She does report  Increased swelling of legs that base line.  She denies long distance, travel, trauma to chest. She denies  Abdominal pain, nausea or vomiting. On arrival to er she was noted to have fever, leucocytosis and cxr showed pneumonia. She also had  Lactic acid elevation for which she was started on iv fluids per protocol, blood cx were drawn ,abx were  Started. We were asked to admit for work up and evaluation of the above problems. Past Medical History:   Diagnosis Date    CAD (coronary artery disease)     Chronic kidney disease     Congestive heart failure (HCC)     Glucose intolerance (impaired glucose tolerance)     Gout     Hypercholesterolemia     Hypertension     MI (myocardial infarction) 1980's    s/p angioplasty    Spondylosis     Stroke ([facility] [address]     Vitamin D deficiency         Past Surgical History:   Procedure Laterality Date    CARDIAC SURG PROCEDURE UNLIST      cardiac stent    HX ANGIOPLASTY  1980s    HX CORONARY STENT PLACEMENT         Social History   Substance Use Topics    Smoking status: Former Smoker     Years: 0.00    Smokeless tobacco: Never Used    Alcohol use No        Family History   Problem Relation Age of Onset    Hypertension Mother     Stroke Sister      No Known Allergies     Prior to Admission medications    Medication Sig Start Date End Date Taking? Authorizing Provider   febuxostat (ULORIC) 80 mg tab tablet Take 80 mg by mouth daily. Yes Historical Provider   furosemide (LASIX) 20 mg tablet Take 20 mg by mouth daily. 5/24/17  Yes Historical Provider   acetaminophen (TYLENOL) 325 mg tablet Take 2 Tabs by mouth every six (6) hours as needed for Pain or Fever. 6/6/17  Yes Gonzalez Langley MD   multivit-min-FA-lycopen-lutein 1.1-038-966 mg-mcg-mcg tab Take 1 Tab by mouth daily. Yes Historical Provider   ferrous sulfate 325 mg (65 mg iron) cpER Take 1 Tab by mouth daily. Yes Historical Provider   nitroglycerin (NITRODUR) 0.4 mg/hr 1 Patch by TransDERmal route daily. Yes Historical Provider   metoprolol succinate (TOPROL-XL) 25 mg XL tablet Take 12.5 mg by mouth daily.    Yes Historical Provider   atorvastatin (LIPITOR) 20 mg tablet Take 1 Tab by mouth daily. 6/20/14  Yes El Flores MD   BIDIL 20-37.5 mg per tablet TAKE ONE TABLET BY MOUTH THREE TIMES DAILY 5/27/14  Yes El Flores MD   aspirin 81 mg tablet Take 81 mg by mouth daily. Yes Historical Provider       REVIEW OF SYSTEMS:     I am not able to complete the review of systems because:    The patient is intubated and sedated    The patient has altered mental status due to his acute medical problems    The patient has baseline aphasia from prior stroke(s)    The patient has baseline dementia and is not reliable historian    The patient is in acute medical distress and unable to provide information           Total of 12 systems reviewed as follows:       POSITIVE= underlined text  Negative = text not underlined  General:  fever, chills, sweats, generalized weakness, weight loss/gain,      loss of appetite   Eyes:    blurred vision, eye pain, loss of vision, double vision  ENT:    rhinorrhea, pharyngitis   Respiratory:   cough, sputum production, SOB, DENSON, wheezing, pleuritic pain   Cardiology:   chest pain, palpitations, orthopnea, PND, edema, syncope   Gastrointestinal:  abdominal pain , N/V, diarrhea, dysphagia, constipation, bleeding   Genitourinary:  frequency, urgency, dysuria, hematuria, incontinence   Muskuloskeletal :  arthralgia, myalgia, back pain  Hematology:  easy bruising, nose or gum bleeding, lymphadenopathy   Dermatological: rash, ulceration, pruritis, color change / jaundice  Endocrine:   hot flashes or polydipsia   Neurological:  headache, dizziness, confusion, focal weakness, paresthesia,     Speech difficulties, memory loss, gait difficulty  Psychological: Feelings of anxiety, depression, agitation    Objective:   VITALS:    Visit Vitals    /45 (BP 1 Location: Right arm, BP Patient Position: At rest)    Pulse 84    Temp 98.3 °F (36.8 °C)    Resp 18    Wt 69.7 kg (153 lb 10.6 oz)    SpO2 97%    BMI 28.1 kg/m2       PHYSICAL EXAM:    General:     no distress, appears stated age. HEENT: Atraumatic, anicteric sclerae, pink conjunctivae     No oral ulcers, mucosa moist, throat clear, dentition fair  Neck:  Supple, symmetrical,  thyroid: non tender  Lungs:   Coarse breath sounds, crackes +, no wheeze   Chest wall:  No tenderness  No Accessory muscle use. Heart:   Regular  rhythm,  No  murmur   No edema  Abdomen:   Soft, non-tender. Not distended. Bowel sounds normal  Extremities: No cyanosis. No clubbing,      Skin turgor normal, Capillary refill normal, Radial dial pulse 2+  Skin:     Not pale. Not Jaundiced  No rashes   Psych:   Not depressed. Not anxious or agitated. Neurologic: EOMs intact. No facial asymmetry. No aphasia or slurred speech. Symmetrical strength, Sensation grossly intact. Alert and and awake     _______________________________________________________________________  Care Plan discussed with:    Comments   Patient y    Family      RN y    Care Manager                    Consultant:      _______________________________________________________________________  Expected  Disposition:   Home with Family y   HH/PT/OT/RN    SNF/LTC    JUSTO    ________________________________________________________________________  TOTAL TIME:  61  Minutes    Critical Care Provided     Minutes non procedure based      Comments    y Reviewed previous records   >50% of visit spent in counseling and coordination of care y Discussion with patient and/or family and questions answered       ________________________________________________________________________  Signed: Bart Bamberger, MD    Procedures: see electronic medical records for all procedures/Xrays and details which were not copied into this note but were reviewed prior to creation of Plan.     LAB DATA REVIEWED:    Recent Results (from the past 24 hour(s))   CBC WITH AUTOMATED DIFF    Collection Time: 11/13/17  8:31 AM   Result Value Ref Range    WBC 13.8 (H) 3.6 - 11.0 K/uL    RBC 3.44 (L) 3.80 - 5.20 M/uL    HGB 10.0 (L) 11.5 - 16.0 g/dL    HCT 32.8 (L) 35.0 - 47.0 %    MCV 95.3 80.0 - 99.0 FL    MCH 29.1 26.0 - 34.0 PG    MCHC 30.5 30.0 - 36.5 g/dL    RDW 14.9 (H) 11.5 - 14.5 %    PLATELET 321 521 - 024 K/uL    NEUTROPHILS 77 (H) 32 - 75 %    LYMPHOCYTES 15 12 - 49 %    MONOCYTES 8 5 - 13 %    EOSINOPHILS 0 0 - 7 %    BASOPHILS 0 0 - 1 %    ABS. NEUTROPHILS 10.6 (H) 1.8 - 8.0 K/UL    ABS. LYMPHOCYTES 2.0 0.8 - 3.5 K/UL    ABS. MONOCYTES 1.1 (H) 0.0 - 1.0 K/UL    ABS. EOSINOPHILS 0.0 0.0 - 0.4 K/UL    ABS. BASOPHILS 0.0 0.0 - 0.1 K/UL   METABOLIC PANEL, COMPREHENSIVE    Collection Time: 11/13/17  8:31 AM   Result Value Ref Range    Sodium 135 (L) 136 - 145 mmol/L    Potassium 4.5 3.5 - 5.1 mmol/L    Chloride 99 97 - 108 mmol/L    CO2 27 21 - 32 mmol/L    Anion gap 9 5 - 15 mmol/L    Glucose 128 (H) 65 - 100 mg/dL    BUN 53 (H) 6 - 20 MG/DL    Creatinine 2.65 (H) 0.55 - 1.02 MG/DL    BUN/Creatinine ratio 20 12 - 20      GFR est AA 21 (L) >60 ml/min/1.73m2    GFR est non-AA 17 (L) >60 ml/min/1.73m2    Calcium 9.2 8.5 - 10.1 MG/DL    Bilirubin, total 1.1 (H) 0.2 - 1.0 MG/DL    ALT (SGPT) 23 12 - 78 U/L    AST (SGOT) 29 15 - 37 U/L    Alk.  phosphatase 88 45 - 117 U/L    Protein, total 8.8 (H) 6.4 - 8.2 g/dL    Albumin 2.9 (L) 3.5 - 5.0 g/dL    Globulin 5.9 (H) 2.0 - 4.0 g/dL    A-G Ratio 0.5 (L) 1.1 - 2.2     LACTIC ACID    Collection Time: 11/13/17  8:31 AM   Result Value Ref Range    Lactic acid 2.9 (HH) 0.4 - 2.0 MMOL/L   CK W/ CKMB & INDEX    Collection Time: 11/13/17  8:31 AM   Result Value Ref Range     26 - 192 U/L    CK - MB 2.7 <3.6 NG/ML    CK-MB Index 1.9 0 - 2.5     NT-PRO BNP    Collection Time: 11/13/17  8:31 AM   Result Value Ref Range    NT pro-BNP 7032 (H) 0 - 450 PG/ML   TROPONIN I    Collection Time: 11/13/17  8:31 AM   Result Value Ref Range    Troponin-I, Qt. 0.15 (H) <0.05 ng/mL   EKG, 12 LEAD, INITIAL    Collection Time: 11/13/17  8:49 AM   Result Value Ref Range    Ventricular Rate 111 BPM    Atrial Rate 111 BPM    P-R Interval 216 ms    QRS Duration 86 ms    Q-T Interval 316 ms    QTC Calculation (Bezet) 429 ms    Calculated P Axis 35 degrees    Calculated R Axis -30 degrees    Calculated T Axis 96 degrees    Diagnosis       Sinus tachycardia with 1st degree AV block  Left axis deviation  Minimal voltage criteria for LVH, may be normal variant  Inferior infarct (cited on or before 13-NOV-2010)  Anteroseptal infarct (cited on or before 13-NOV-2010)  Abnormal ECG  When compared with ECG of 30-AUG-2017 12:53,  Vent.  rate has increased BY  45 BPM  Serial changes of Anteroseptal infarct present  Confirmed by Christopher Herman ([id number]) on 11/13/2017 4:43:56 PM     LACTIC ACID    Collection Time: 11/13/17  1:07 PM   Result Value Ref Range    Lactic acid 0.8 0.4 - 2.0 MMOL/L

## 2017-11-14 NOTE — PROGRESS NOTES
Rodolfo Roman is a 80 yr old female who has been admitted due to sever sepsis, PNA, acute kidney injury, HTN and CHF. Patient reports she lives at home with her son and daughter-N-law. Son is with patient during the day and helps with transport and IADL's. Therapy has mobilized and has found patient weaker than baseline requiring Min assist of 1 with rolling walker to ambulate 90'. Therapy is recommending home health services at time of discharge. This writer to follow hospital course and assist with coordination of discharge services. Care Management Interventions  PCP Verified by CM: Yes  MyChart Signup: No  Discharge Durable Medical Equipment: No (owns a rolling walker and BSC)  Physical Therapy Consult: Yes  Occupational Therapy Consult: No  Speech Therapy Consult: No  Current Support Network: Relative's Home (Lives with son who provides transport and for IADl's)  Confirm Follow Up Transport: Family  Plan discussed with Pt/Family/Caregiver: Yes  Freedom of Choice Offered:  Yes

## 2017-11-14 NOTE — PROGRESS NOTES
Problem: Falls - Risk of  Goal: *Absence of Falls  Document Mariajose Fall Risk and appropriate interventions in the flowsheet. Outcome: Progressing Towards Goal  Fall Risk Interventions:  Mobility Interventions: Strengthening exercises (ROM-active/passive), PT Consult for assist device competence, PT Consult for mobility concerns         Medication Interventions: Teach patient to arise slowly, Patient to call before getting OOB, Evaluate medications/consider consulting pharmacy    Elimination Interventions:  Toileting schedule/hourly rounds, Toilet paper/wipes in reach, Patient to call for help with toileting needs, Elevated toilet seat

## 2017-11-14 NOTE — PROGRESS NOTES
Cardiopulmonary Care Interdisciplinary rounds were held today to discuss patient plan of care and outcomes. The following members were present: NP/Physician, PT, Pharmacy, Nursing, Nutritionist and Case Management.     Plan of Care: Continue current treatment plan; Monitor troponin

## 2017-11-14 NOTE — PROGRESS NOTES
Hospitalist Progress Note    NAME: Eri Sigala   :  1927   MRN:  [id number]     Interim Hospital Summary: 80 y.o. female whom presented on 2017 with      Assessment / Plan:    Severe sepsis due to CAP  Hypoxemia  -CXR Right midlung and right basilar patchy airspace consolidation  -family has not observed patient to have any problems swallowing   -leukocytosis resolved, fever trending down, cultures NGTD. Continue IV rocephin / zithromax and likely can transition to orals in AM  -taper oxygen. May need home oxygen. Ordered IS    BREEZY on CKD 4  -holding diuretics. Patient has received IVF bolus in ER and fluids overnight. Will discontinue due to risk of overload. Follow UOP and Cr    CAD / HTN  Chronic systolic HF (EF 65-30%)  -continue asa, isordil, BB, hydralazine and statin  -restart lasix tomorrow.   -mild troponin elevation likely related to initial hypoxia and sepsis in the setting of CKD. Patient had a cardiac cath in  demonstrating severe 3 vessel disease. Rec then was medical management. Her right sided CP is pleuritic in nature and related to her CAP. Body mass index is 29.19 kg/(m^2). Code status: Full  Prophylaxis: Hep SQ  Recommended Disposition: Home w/Family       Subjective:     Chief Complaint / Reason for Physician Visit  Follow up of severe sepsis, CAP, BREEZY, HTN   Chart reviewed in detail. Discussed with RN events overnight. Review of Systems:  Symptom Y/N Comments  Symptom Y/N Comments   Fever/Chills y  improved  Chest Pain y  pleuritic   Poor Appetite    Edema     Cough y   Abdominal Pain     Sputum y   Joint Pain     SOB/DENSON y   Pruritis/Rash     Nausea/vomit    Tolerating PT/OT     Diarrhea    Tolerating Diet     Constipation    Other       Could NOT obtain due to:      PO intake: No data found. Objective:     VITALS:   Last 24hrs VS reviewed since prior progress note.  Most recent are:  Patient Vitals for the past 24 hrs:   Temp Pulse Resp BP SpO2   11/14/17 1034 99.4 °F (37.4 °C) 99 18 127/61 100 %   11/14/17 0719 99 °F (37.2 °C) (!) 104 18 127/51 99 %   11/14/17 0643 99 °F (37.2 °C) - - - -   11/14/17 0305 (!) 101 °F (38.3 °C) (!) 114 18 138/68 97 %   11/13/17 2329 99.4 °F (37.4 °C) (!) 116 18 146/59 100 %   11/13/17 1929 98.7 °F (37.1 °C) 98 18 120/56 99 %     No intake or output data in the 24 hours ending 11/14/17 1512     PHYSICAL EXAM:  General: WD, WN. Alert, cooperative, no acute distress    EENT:  EOMI. Anicteric sclerae. MMM  Resp:  bibasal crackles   CV:  Regular  rhythm,  mild edema  GI:  Soft, Non distended, Non tender.  +Bowel sounds  Neurologic:  Alert and follows commands, normal speech,   Psych:   Not anxious nor agitated  Skin:  No rashes. No jaundice    Reviewed most current lab test results and cultures  YES  Reviewed most current radiology test results   YES  Review and summation of old records today    NO  Reviewed patient's current orders and MAR    YES  PMH/ reviewed - no change compared to H&P  ________________________________________________________________________  Care Plan discussed with:    Comments   Patient x    Family  x Son and daughter   RN x    Care Manager     Consultant                        Multidiciplinary team rounds were held today with , nursing, pharmacist and clinical coordinator. Patient's plan of care was discussed; medications were reviewed and discharge planning was addressed.      ________________________________________________________________________  Total NON critical care TIME:  30   Minutes    Total CRITICAL CARE TIME Spent:   Minutes non procedure based      Comments   >50% of visit spent in counseling and coordination of care x     This includes time during multidisciplinary rounds if indicated above   ________________________________________________________________________  Luis Long MD     Procedures: see electronic medical records for all procedures/Xrays and details which were not copied into this note but were reviewed prior to creation of Plan. LABS:  I reviewed today's most current labs and imaging studies.   Pertinent labs include:  Recent Labs      11/14/17 0340 11/13/17 0831   WBC  10.8  13.8*   HGB  8.8*  10.0*   HCT  28.9*  32.8*   PLT  166  190     Recent Labs      11/14/17 0340 11/13/17   0831   NA  137  135*   K  5.0  4.5   CL  105  99   CO2  23  27   GLU  79  128*   BUN  57*  53*   CREA  2.50*  2.65*   CA  8.6  9.2   ALB   --   2.9*   TBILI   --   1.1*   SGOT   --   29   ALT   --   23

## 2017-11-14 NOTE — PROGRESS NOTES
Problem: Mobility Impaired (Adult and Pediatric)  Goal: *Acute Goals and Plan of Care (Insert Text)  Physical Therapy Goals  Initiated 11/14/2017  1. Patient will move from supine to sit and sit to supine , scoot up and down and roll side to side in bed with supervision/set-up within 7 day(s). 2.  Patient will transfer from bed to chair and chair to bed with supervision/set-up using the least restrictive device within 7 day(s). 3.  Patient will perform sit to stand with supervision/set-up within 7 day(s). 4.  Patient will ambulate with supervision/set-up for 150 feet with the least restrictive device within 7 day(s). physical Therapy EVALUATION  Patient: Claudetta Paterson (75 y.o. female)  Date: 11/14/2017  Primary Diagnosis: Severe sepsis ([address]  CAP (community acquired pneumonia)        Precautions:        ASSESSMENT :  Based on the objective data described below, the patient presents with increased weakness, decreased endurance/activity tolerance, DENSON, dynamic balance deficits, and overall impaired functional mobility. Pt received supine in bed, agreeable to participation with therapy. Pt required modAx1 in order to assume seated position EOB. Sitting balance intact EOB however pt with kyphotic posture. Trialed pt on RA however O2 sats decreased to 84% at seated rest on RA. Therefore reapplied 2 LPM O2 for remainder of mobility. Pt sit>>stand w/ minAx1 and ambulated 90ft w/ rollator walker and CGA. Pt with significantly decreased gait speed in addition to shuffled gait pattern (minimal step length, step height bilaterally). Gait speed and overall gait stability continued to deteriorate as pt fatigued. O2 sats 94%,  BPM at completion of ambulation trial. Increased labored breathing noted therefore attempted to educate pt regarding PLB. Despite verbal and visual cues, pt unable to perform.  Anticipate that as respiratory status continues to improve, pt will be appropriate to discharge home w/ [place] PT and 24hr supervision/assist of family. Patient will benefit from skilled intervention to address the above impairments. Patients rehabilitation potential is considered to be Good  Factors which may influence rehabilitation potential include:   []         None noted  []         Mental ability/status  [x]         Medical condition  []         Home/family situation and support systems  []         Safety awareness  []         Pain tolerance/management  []         Other:      PLAN :  Recommendations and Planned Interventions:  [x]           Bed Mobility Training             []    Neuromuscular Re-Education  [x]           Transfer Training                   []    Orthotic/Prosthetic Training  [x]           Gait Training                         []    Modalities  [x]           Therapeutic Exercises           []    Edema Management/Control  [x]           Therapeutic Activities            [x]    Patient and Family Training/Education  []           Other (comment):    Frequency/Duration: Patient will be followed by physical therapy  4 times a week to address goals. Discharge Recommendations: HH PT w/ 24hr supervision of family  Further Equipment Recommendations for Discharge: None, owns rollator walker     SUBJECTIVE:   Patient stated I feel okay.     OBJECTIVE DATA SUMMARY:   HISTORY:    Past Medical History:   Diagnosis Date    CAD (coronary artery disease)     Chronic kidney disease     Congestive heart failure (HCC)     Glucose intolerance (impaired glucose tolerance)     Gout     Hypercholesterolemia     Hypertension     MI (myocardial infarction) 1980's    s/p angioplasty    Spondylosis     Stroke ([facility]     Vitamin D deficiency      Past Surgical History:   Procedure Laterality Date    CARDIAC SURG PROCEDURE UNLIST      cardiac stent    HX ANGIOPLASTY  1980s    HX CORONARY STENT PLACEMENT       Prior Level of Function/Home Situation: Pt lives at home w/ son and daughter-in-law.  Son is retired and home with pt during the day. Pt reports ambulation with use of rollator walker and denies history of falls. Denies home O2 use. Personal factors and/or comorbidities impacting plan of care:     Home Situation  Home Environment: Private residence  # Steps to Enter: 0  Wheelchair Ramp: Yes  One/Two Story Residence: One story  Living Alone: No  Support Systems: Child(tamera), Family member(s)  Patient Expects to be Discharged to[de-identified] Private residence  Current DME Used/Available at Home: Tyra Mcmahon rollator    EXAMINATION/PRESENTATION/DECISION MAKING:   Critical Behavior:  Neurologic State: Alert  Orientation Level: Oriented X4  Cognition: Follows commands     Hearing: Auditory  Auditory Impairment: Hard of hearing, bilateral  Skin:  Intact  Edema: None noted   Range Of Motion:  AROM: Generally decreased, functional                       Strength:    Strength: Generally decreased, functional                    Tone & Sensation:   Tone: Normal              Sensation: Intact               Coordination:  Coordination: Within functional limits  Vision:      Functional Mobility:  Bed Mobility:     Supine to Sit: Moderate assistance     Scooting: Supervision  Transfers:  Sit to Stand: Minimum assistance  Stand to Sit: Minimum assistance        Bed to Chair: Contact guard assistance              Balance:   Sitting: Intact  Standing: Impaired; With support  Standing - Static: Fair;Constant support  Standing - Dynamic : Fair  Ambulation/Gait Training:  Distance (ft): 90 Feet (ft)  Assistive Device: Walker, rollator;Gait belt  Ambulation - Level of Assistance: Contact guard assistance        Gait Abnormalities: Shuffling gait        Base of Support: Narrowed     Speed/Simona: Shuffled;Pace decreased (<100 feet/min)  Step Length: Left shortened;Right shortened                      Functional Measure:  Barthel Index:    Bathin  Bladder: 0  Bowels: 5  Groomin  Dressin  Feeding: 10  Mobility: 0  Stairs: 0  Toilet Use: 5  Transfer (Bed to Chair and Back): 10  Total: 35       Barthel and G-code impairment scale:  Percentage of impairment CH  0% CI  1-19% CJ  20-39% CK  40-59% CL  60-79% CM  80-99% CN  100%   Barthel Score 0-100 100 99-80 79-60 59-40 20-39 1-19   0   Barthel Score 0-20 20 17-19 13-16 9-12 5-8 1-4 0      The Barthel ADL Index: Guidelines  1. The index should be used as a record of what a patient does, not as a record of what a patient could do. 2. The main aim is to establish degree of independence from any help, physical or verbal, however minor and for whatever reason. 3. The need for supervision renders the patient not independent. 4. A patient's performance should be established using the best available evidence. Asking the patient, friends/relatives and nurses are the usual sources, but direct observation and common sense are also important. However direct testing is not needed. 5. Usually the patient's performance over the preceding 24-48 hours is important, but occasionally longer periods will be relevant. 6. Middle categories imply that the patient supplies over 50 per cent of the effort. 7. Use of aids to be independent is allowed. Julianna Hogue., Barthel, D.W. (3595). Functional evaluation: the Barthel Index. [address] [facility] (14)2. ADELSO Duckworth, [place] Thai., Glendy BowerHalifax Health Medical [facility] [place], [facility] (1999). Measuring the change indisability after inpatient rehabilitation; comparison of the responsiveness of the Barthel Index and Functional Person Measure. Journal of Neurology, Neurosurgery, and Psychiatry, 66(4), 715-264. Reymundo Arredondo, N.J.A, GER Zhang.VENITA, & Marla Nicole MHannahA. (2004.) Assessment of post-stroke quality of life in cost-effectiveness studies: The usefulness of the Barthel Index and the EuroQoL-5D. Quality of Life Research, 13, 290-65       G codes:   In compliance with CMSs Claims Based Outcome Reporting, the following G-code set was chosen for this patient based on their primary functional limitation being treated: The outcome measure chosen to determine the severity of the functional limitation was the Barthel Index with a score of 35/100 which was correlated with the impairment scale. ? Mobility - Walking and Moving Around:     - CURRENT STATUS: CL - 60%-79% impaired, limited or restricted    - GOAL STATUS: CJ - 20%-39% impaired, limited or restricted    - D/C STATUS:  ---------------To be determined---------------      Physical Therapy Evaluation Charge Determination   History Examination Presentation Decision-Making   MEDIUM  Complexity : 1-2 comorbidities / personal factors will impact the outcome/ POC  MEDIUM Complexity : 3 Standardized tests and measures addressing body structure, function, activity limitation and / or participation in recreation  MEDIUM Complexity : Evolving with changing characteristics  MEDIUM Complexity : FOTO score of 26-74      Based on the above components, the patient evaluation is determined to be of the following complexity level: MEDIUM    Pain:  Pain Scale 1: Numeric (0 - 10)  Pain Intensity 1: 0              Activity Tolerance:   O2 sats 84% on RA at seated rest, 94% post-ambulation trial on 2 LPM O2  Please refer to the flowsheet for vital signs taken during this treatment. After treatment:   [x]         Patient left in no apparent distress sitting up in chair  []         Patient left in no apparent distress in bed  [x]         Call bell left within reach  [x]         Nursing notified  [x]         Caregiver present  [x]         Bed alarm activated    COMMUNICATION/EDUCATION:   The patients plan of care was discussed with: Registered Nurse. [x]         Fall prevention education was provided and the patient/caregiver indicated understanding. [x]         Patient/family have participated as able in goal setting and plan of care. [x]         Patient/family agree to work toward stated goals and plan of care.   []         Patient understands intent and goals of therapy, but is neutral about his/her participation. []         Patient is unable to participate in goal setting and plan of care.     Thank you for this referral.  Orlie Siemens, PT, DPT   Time Calculation: 21 mins

## 2017-11-15 NOTE — PROGRESS NOTES
Hospitalist Progress Note    NAME: Emerson Cavanaugh   :  1927   MRN:  [id number]     Interim Hospital Summary: [address] female whom presented on 2017 with      Assessment / Plan:    Dehydration  Hypotension  BREEZY on CKD 4  -sleepy and weak appearing. Azotemia worse. Will start IVFs, she got bolus in ER   -hold diuretics and hydralazine    Severe sepsis due to CAP  Hypoxemia  -CXR Right midlung and right basilar patchy airspace consolidation  -family has not observed patient to have any problems swallowing   -leukocytosis resolved, fever trending down, cultures NGTD. Change to PO levaquin  -RA sat 87%. Continue oxygen. Likely will need home oxygen at discharge    CAD / HTN  Chronic systolic HF (EF 94-33%)  -continue asa, isordil, BB, hydralazine and statin  -restart lasix tomorrow.   -mild troponin elevation likely related to initial hypoxia and sepsis in the setting of CKD. Patient had a cardiac cath in  demonstrating severe 3 vessel disease. Rec then was medical management. Her right sided CP is pleuritic in nature and related to her CAP. Body mass index is 29.19 kg/(m^2). Code status: Full  Prophylaxis: Hep SQ  Recommended Disposition: Home w/Family       Subjective:     Chief Complaint / Reason for Physician Visit  Follow up of severe sepsis, CAP, BREEZY, HTN   Chart reviewed in detail. Discussed with RN events overnight. Looks sleepy     Review of Systems:  Symptom Y/N Comments  Symptom Y/N Comments   Fever/Chills n   Chest Pain y  pleuritic   Poor Appetite    Edema     Cough y   Abdominal Pain     Sputum y   Joint Pain     SOB/DENSON y   Pruritis/Rash     Nausea/vomit    Tolerating PT/OT     Diarrhea    Tolerating Diet     Constipation    Other       Could NOT obtain due to:      PO intake: No data found. Objective:     VITALS:   Last 24hrs VS reviewed since prior progress note.  Most recent are:  Patient Vitals for the past 24 hrs: Temp Pulse Resp BP SpO2   11/15/17 1102 97.9 °F (36.6 °C) 82 16 (!) 90/39 98 %   11/15/17 0725 98.1 °F (36.7 °C) 99 16 122/56 98 %   11/15/17 0300 98.7 °F (37.1 °C) (!) 103 16 127/53 100 %   11/14/17 2318 99.3 °F (37.4 °C) 97 16 (!) 117/38 100 %   11/14/17 1936 98.2 °F (36.8 °C) 99 16 134/61 100 %   11/14/17 1552 98.3 °F (36.8 °C) 87 16 114/49 98 %       Intake/Output Summary (Last 24 hours) at 11/15/17 1107  Last data filed at 11/15/17 0200   Gross per 24 hour   Intake                0 ml   Output              100 ml   Net             -100 ml        PHYSICAL EXAM:  General: WD, WN. cooperative, no acute distress    EENT:  EOMI. Anicteric sclerae. MMM  Resp:  bibasal crackles   CV:  Regular  rhythm,  mild edema  GI:  Soft, Non distended, Non tender.  +Bowel sounds  Neurologic:  Arouses to voice. Looks sleepy. follows commands, normal speech,   Psych:   Not anxious nor agitated  Skin:  No rashes. No jaundice    Reviewed most current lab test results and cultures  YES  Reviewed most current radiology test results   YES  Review and summation of old records today    NO  Reviewed patient's current orders and MAR    YES  PMH/ reviewed - no change compared to H&P  ________________________________________________________________________  Care Plan discussed with:    Comments   Patient x    Family  x Son    RN x    Care Manager     Consultant                        Multidiciplinary team rounds were held today with , nursing, pharmacist and clinical coordinator. Patient's plan of care was discussed; medications were reviewed and discharge planning was addressed.      ________________________________________________________________________  Total NON critical care TIME:  30   Minutes    Total CRITICAL CARE TIME Spent:   Minutes non procedure based      Comments   >50% of visit spent in counseling and coordination of care x     This includes time during multidisciplinary rounds if indicated above ________________________________________________________________________  Italo Menendez MD     Procedures: see electronic medical records for all procedures/Xrays and details which were not copied into this note but were reviewed prior to creation of Plan. LABS:  I reviewed today's most current labs and imaging studies.   Pertinent labs include:  Recent Labs      11/14/17 0340 11/13/17 0831   WBC  10.8  13.8*   HGB  8.8*  10.0*   HCT  28.9*  32.8*   PLT  166  190     Recent Labs      11/15/17   0310  11/14/17   0340  11/13/17   0831   NA  137  137  135*   K  5.3*  5.0  4.5   CL  104  105  99   CO2  24  23  27   GLU  94  79  128*   BUN  71*  57*  53*   CREA  2.87*  2.50*  2.65*   CA  8.8  8.6  9.2   ALB   --    --   2.9*   TBILI   --    --   1.1*   SGOT   --    --   29   ALT   --    --   23

## 2017-11-15 NOTE — PROGRESS NOTES
1100:  Cardiopulmonary Care Interdisciplinary rounds were held today to discuss patient plan of care and outcomes. The following members were present: NP/Physician, PT, Pharmacy, Nursing, Nutritionist and Case Management. Expected Length of Stay:  4d 21h  Geometric Length of Stay: DRG GLOS: 4.9    Plan of Care: Continue current treatment plan;  Wean O2 and perform O2 challenge test

## 2017-11-15 NOTE — PROGRESS NOTES
Physical Therapy  Attempting to see patient for PT this pm. Nursing reporting patient more fatigued today and requiring more assistance for mobility. Cleared for PT if willing to participate. Upon arrival patient sleeping and family asking that patient not be disturbed at this time, stating patient not feeling well today. Therapy session aborted. Will follow back tomorrow.   Thank you,  Zbigniew Islas, PT

## 2017-11-15 NOTE — PROGRESS NOTES
Bedside shift change report given to Tracey Fuentes (oncoming nurse) by Eulalio Gibbs (offgoing nurse). Report included the following information SBAR, Intake/Output, MAR, Recent Results and Cardiac Rhythm STach. SHIFT SUMMARY:            [address] NURSING NOTE   Admission Date 11/13/2017   Admission Diagnosis Severe sepsis ([address]  CAP (community acquired pneumonia)   Consults None      Cardiac Monitoring [x] Yes [] No      Purposeful Hourly Rounding [x] Yes    Mariajose Score Total Score: 3   Mariajose score 3 or > [x] Bed Alarm [] Avasys [] 1:1 sitter [] Patient refused (Place signed refusal form in chart)   Elton Score Elton Score: 18   Elton score 14 or < [] PMT consult [] Wound Care consult    []  Specialty bed  [] Nutrition consult      Influenza Vaccine Received Flu Vaccine for Current Season (usually Sept-March): Yes           Oxygen needs? [] Room air Oxygen @  []1L    [x]2L    []3L   []4L    []5L   []6L     Use home O2? [] Yes [x] No  Perform O2 challenge test using  smartphrase (.Homeoxygen)      Last bowel movement Last Bowel Movement Date: 11/13/17      Urinary Catheter             LDAs               Peripheral IV 11/13/17 Right Antecubital (Active)   Site Assessment Clean, dry, & intact 11/14/2017  4:45 PM   Phlebitis Assessment 0 11/14/2017  4:45 PM   Infiltration Assessment 0 11/14/2017  4:45 PM   Dressing Status Clean, dry, & intact 11/14/2017  4:45 PM   Dressing Type Tape;Transparent 11/14/2017  4:45 PM   Hub Color/Line Status Pink 11/14/2017  4:45 PM                         Readmission Risk Assessment Tool Score High Risk            37       Total Score        3 Has Seen PCP in Last 6 Months (Yes=3, No=0)    4 IP Visits Last 12 Months (1-3=4, 4=9, >4=11)    5 Pt. Coverage (Medicare=5 , Medicaid, or Self-Pay=4)    25 Charlson Comorbidity Score (Age + Comorbid Conditions)        Criteria that do not apply:    . Living with Significant Other. Assisted Living. LTAC. SNF.  or   Rehab    Patient Length of Stay (>5 days = 3)       Expected Length of Stay 4d 21h   Actual Length of Stay 1

## 2017-11-16 NOTE — CONSULTS
[address]   [address] [place] [address]       Name:  Tammy Reyna   MR#:  [id number]   :  1927   Account #:  [de-identified]    Date of Consultation:  2017   Date of Adm:  2017       REQUESTING PHYSICIAN: Raymundo Marvin MD    REASON FOR CONSULTATION: Evaluate ventricular ectopy,   bigeminy. CHIEF COMPLAINT: The patient voices no chief complaint. HISTORY OF PRESENT ILLNESS: The patient is a 61-year-old   female with a history of a known ischemic cardiomyopathy and a prior   non-ST elevation MI back in . The patient had diffuse 3-vessel   coronary artery disease at that time and medical therapy was   recommended. Her EF has been around 30% to 35% over the last few   years. She is managed conservatively and done surprisingly well. She   also has a history of hypertension, dyslipidemia and a prior stroke. She   was admitted on 2017 after she presented with shortness of   breath and productive cough. She was diagnosed with community-  acquired pneumonia. Since that time, she has developed progressive   renal dysfunction, lethargy and periods of hypotension. She remains   persistently hypoxemic. She was noted to have ventricular ectopy and   bigeminy on the monitor today, and I was asked to see the patient for   evaluation. She is not seen in the office for some time. She is currently   not complaining of chest pain and appears reasonably comfortable. She is otherwise fairly nonverbal. Apparently diuretics and hydralazine   were held today because of worsening renal function and hypotension. Her renal ultrasound was done and the results are pending. A Buenrostro   catheter was just placed. PAST MEDICAL HISTORY: As noted above, prior stroke, gout, chronic   renal insufficiency. PAST SURGICAL HISTORY: None documented. CURRENT MEDICATIONS   1. Aspirin. 2. Isosorbide. 3. Metoprolol. 4. Lipitor. 5. Albumin.    6. Ceftriaxone. 7. Zithromax. 8. Uloric. 9. Nephrocaps. 10. Iron sulfate. SOCIAL HISTORY: The patient does not smoke or abuse alcohol. She   is a former smoker. FAMILY HISTORY: Her sister had a stroke. REVIEW OF SYSTEMS: As noted above, otherwise not obtainable   and noncontributory. PHYSICAL EXAMINATION   GENERAL: Frail-appearing, elderly  female, currently   quite lethargic, lying in bed. VITAL SIGNS: Blood pressure 119/38, pulse 75, respirations 22,   temperature 97.4. HEENT: Unremarkable. Pupils are equal.   NECK: No obvious masses or thyromegaly. Exam is difficult. No   obvious JVD or bruits. CHEST: Scattered crackles bilaterally. Poor inspiratory effort. SKIN: Warm and dry. No obvious rashes. CARDIAC: Regular rate and rhythm. Distant heart sounds. No obvious   murmurs or gallops. ABDOMEN: Soft, nontender, no masses or organomegaly. Bowel   sounds positive. EXTREMITIES: No cyanosis or clubbing, 1+ edema. Distal pulses not   well palpated. NEUROLOGIC: The patient is lethargic, but answers questions. LABORATORY DATA: Hemoglobin 8.8. BUN 85, creatinine   3.2, sodium 133. Troponin 0.57. Chest x-ray: Right mid lung patchy   airspace disease. EKG on admission: Sinus rhythm, first-degree AV   block, no Q-waves, nonspecific ST-T changes. IMPRESSION   1. Community-acquired pneumonia with acute respiratory failure. 2. Acute-on-chronic renal failure. 3. Known ischemic cardiomyopathy with diffuse multivessel coronary   artery disease. 4. Chronic systolic heart failure. 5. Ventricular bigeminy. 6. Prior cerebrovascular accident. RECOMMENDATIONS: The patient has clearly developed progressive   multiorgan dysfunction and her prognosis is poor. I do not think she is   a good candidate for hemodialysis. I would favor conservative therapy   with perhaps Palliative Care and/or Hospice involvement.  In the   meantime, can continue aspirin and low dose of beta blocker. The   patient does not appear to have an acute cardiac event. Thank you for this consult.         MD Lyric Pete / LEE   D:  11/16/2017   17:25   T:  11/16/2017   18:49   Job #:  [id number]

## 2017-11-16 NOTE — CONSULTS
[facility] COnsult Note        NAME: Claudetta Paterson       :  1927       MRN:  [id number]     Date/Time: 2017    Risk of renal deterioration: high       Assessment:    Plan:  (R) PNA  ARF on CKD IV (baseline 1.8)  CMO ef 35%  Anemia  Hx of CVA  AMS   Buenrostro  Renal us  Stop levaquin for now due to ams  Stop ivf as I can't tell her volume status from exam  Albumin x 1  Repeat a cxr  D/w son- who is a resp therapist in [place]-renal function may continue to decline and if it does, would family want to pursue hd in the patient-  Diuretics on hold       Asked to see for arf  Subjective:     Chief Complaint:  Poor historian    Review of Systems: Patient was not able to provide review of systems due to mental status change/acute illness    Objective:     VITALS:   Last 24hrs VS reviewed since prior progress note. Most recent are:  Visit Vitals    BP (!) 104/38 (BP 1 Location: Left arm, BP Patient Position: At rest)    Pulse 77    Temp 97.2 °F (36.2 °C)    Resp 24    Ht 5' 7\" (1.702 m)    Wt 72.5 kg (159 lb 14.4 oz)    SpO2 99%    BMI 25.04 kg/m2     SpO2 Readings from Last 6 Encounters:   17 99%   10/30/17 98%   17 95%   17 99%   17 97%   17 95%    O2 Flow Rate (L/min): 1.5 l/min   No intake or output data in the 24 hours ending 17 1331     Telemetry Reviewed      PHYSICAL EXAM:    General   Wd, elderly AAF in mild resp distress  EENT  Ncat, eomi  Respiratory   Rhonchi in all lung fields  Cardiology  RRR  Abdominal  soft  Extremities  Developing edema  Skin  Normal skin turgor.   No rashes or skin ulcers noted  Neurological  No focal neurological deficits noted              Lab Data Reviewed: (see below)    Medications Reviewed: (see below)    PMH/SH reviewed - no change compared to H&P  ___________________________________________________      Care Plan discussed with:Patient, Family and dr. Dennis Ferreira     Discussed: Care Plan    Disposition: to be determined  ___________________________________________________    Attending Physician: Letha Irene MD     ____________________________________________________  MEDICATIONS:  Current Facility-Administered Medications   Medication Dose Route Frequency    [START ON 11/17/2017] heparin (porcine) injection 5,000 Units  5,000 Units SubCUTAneous Q12H    [START ON 11/17/2017] B complex-vitaminC-folic acid (NEPHROCAP) cap  1 Cap Oral DAILY    albumin human 25% (BUMINATE) solution 12.5 g  12.5 g IntraVENous ONCE    aspirin chewable tablet 81 mg  81 mg Oral DAILY    atorvastatin (LIPITOR) tablet 20 mg  20 mg Oral DAILY    febuxostat (ULORIC) tablet 80 mg  80 mg Oral DAILY    ferrous sulfate tablet 325 mg  325 mg Oral DAILY    metoprolol succinate (TOPROL-XL) XL tablet 12.5 mg  12.5 mg Oral DAILY    sodium chloride (NS) flush 5-10 mL  5-10 mL IntraVENous Q8H    sodium chloride (NS) flush 5-10 mL  5-10 mL IntraVENous PRN    acetaminophen (TYLENOL) tablet 650 mg  650 mg Oral Q6H PRN    ondansetron (ZOFRAN) injection 4 mg  4 mg IntraVENous Q6H PRN    docusate sodium (COLACE) capsule 100 mg  100 mg Oral DAILY PRN    isosorbide dinitrate (ISORDIL) tablet 20 mg  20 mg Oral TID        LABS:  Recent Labs      11/14/17   0340   WBC  10.8   HGB  8.8*   HCT  28.9*   PLT  166     Recent Labs      11/16/17   0351  11/15/17   0310  11/14/17   0340   NA  133*  137  137   K  5.3*  5.3*  5.0   CL  102  104  105   CO2  23  24  23   BUN  85*  71*  57*   CREA  3.20*  2.87*  2.50*   GLU  124*  94  79   CA  8.7  8.8  8.6     No results for input(s): SGOT, GPT, ALT, AP, TBIL, TBILI, TP, ALB, GLOB, GGT, AML, LPSE in the last 72 hours. No lab exists for component: AMYP, HLPSE  No results for input(s): INR, PTP, APTT in the last 72 hours. No lab exists for component: INREXT   No results for input(s): FE, TIBC, PSAT, FERR in the last 72 hours. No results for input(s): PH, PCO2, PO2 in the last 72 hours.   Recent Labs 11/14/17   0340  11/13/17   1950   TROIQ  0.57*  0.39*     Lab Results   Component Value Date/Time    Glucose (POC) 84 06/11/2016 08:45 AM    Glucose (POC) 182 06/10/2016 08:50 PM    Glucose (POC) 102 06/10/2016 04:47 PM    Glucose (POC) 142 06/10/2016 12:35 PM    Glucose (POC) 89 06/10/2016 08:10 AM

## 2017-11-16 NOTE — PROGRESS NOTES
Attempted to see pt this am and pt was calm until I attempted to put on a gait belt. She then became combative swinging and kicking. Aborted tx and notified nursing. Will continue to follow.

## 2017-11-16 NOTE — CARDIO/PULMONARY
Cardiopulmonary Rehab Nursing Entry:    Chart reviewed and pt visited with multiple family members present at bedside. Pt admitted with CAP with history of diastolic heart failure. EF 35% as of 6/2016. No new EF reported. Pt received CHF instruction during prior admission 6/2017. This was a follow-up visit to answer questions and reinforce prior teaching re: CHF, S&Ss, medication management, Low NA diet, daily weights and when to call the doctor. Pt resides with son who provides low sodium meals,  he is well versed in reading nutritional labels. They have a scale for pt to weigh herself on daily and state the parameters to contact MD.  Son fills pillbox and sets meds out for pt. She has a walker to assist her with ambulation at home. Pt and family members verbalized understanding.

## 2017-11-16 NOTE — PROGRESS NOTES
Hospitalist Progress Note    NAME: Eri Sigala   :  1927   MRN:  [id number]     Interim Hospital Summary: 80 y.o. female whom presented on 2017 with      Assessment / Plan:    Hypotension  BREEZY on CKD 4  -hold diuretics and hydralazine. Did not improve with IVFs. -Discussed with Renal.  Insert middleton, check renal US and try albumin. Acute encephalopathy  -suspect related to levaquin (started yesterday) - discontinued    Severe sepsis due to CAP  Hypoxemia  -CXR Right midlung and right basilar patchy airspace consolidation  -family has not observed patient to have any problems swallowing   -unable to wean oxygen    CAD / HTN  Chronic systolic HF (EF 24-48%)  -continue asa, isordil, BB, hydralazine and statin  -restart lasix tomorrow.   -mild troponin elevation likely related to initial hypoxia and sepsis in the setting of CKD. Patient had a cardiac cath in  demonstrating severe 3 vessel disease. Rec then was medical management. Her right sided CP is pleuritic in nature and related to her CAP. Body mass index is 25.04 kg/(m^2). Code status: Full  Prophylaxis: Hep SQ  Recommended Disposition: Home w/Family       Subjective:     Chief Complaint / Reason for Physician Visit  Follow up of severe sepsis, CAP, BREEZY, HTN   Chart reviewed in detail. Discussed with RN events overnight. Looks sleepy today. Little UOP    Review of Systems:  Symptom Y/N Comments  Symptom Y/N Comments   Fever/Chills n   Chest Pain y  pleuritic   Poor Appetite    Edema     Cough y   Abdominal Pain     Sputum y   Joint Pain     SOB/DENSON y   Pruritis/Rash     Nausea/vomit    Tolerating PT/OT     Diarrhea    Tolerating Diet     Constipation    Other       Could NOT obtain due to:      PO intake: No data found. Objective:     VITALS:   Last 24hrs VS reviewed since prior progress note.  Most recent are:  Patient Vitals for the past 24 hrs:   Temp Pulse Resp BP SpO2   11/16/17 1128 - 77 24 (!) 104/38 99 %   11/16/17 0755 97.2 °F (36.2 °C) 77 24 (!) 96/31 100 %   11/16/17 0421 97.3 °F (36.3 °C) 80 24 114/53 97 %   11/16/17 0005 - 85 - - 97 %   11/16/17 0000 - (!) 45 - - -   11/15/17 2350 - (!) 58 - - (!) 75 %   11/15/17 2229 97.1 °F (36.2 °C) 87 24 (!) 99/37 98 %   11/15/17 2020 98 °F (36.7 °C) 81 24 102/63 100 %   11/15/17 1601 97.7 °F (36.5 °C) 84 16 102/47 98 %     No intake or output data in the 24 hours ending 11/16/17 1352     PHYSICAL EXAM:  General: WD, WN. cooperative, no acute distress    EENT:  EOMI. Anicteric sclerae. MMM  Resp:  bibasal crackles   CV:  Regular  rhythm,  mild edema  GI:  Soft, Non distended, Non tender.  +Bowel sounds  Neurologic:  Arouses to voice. Looks sleepy. follows commands, normal speech,   Psych:   Not anxious nor agitated  Skin:  No rashes. No jaundice    Reviewed most current lab test results and cultures  YES  Reviewed most current radiology test results   YES  Review and summation of old records today    NO  Reviewed patient's current orders and MAR    YES  PMH/SH reviewed - no change compared to H&P  ________________________________________________________________________  Care Plan discussed with:    Comments   Patient x    Family  x Son and Alia   RN x    Care Manager     Consultant  x Dr Quentin Conway team rounds were held today with , nursing, pharmacist and clinical coordinator. Patient's plan of care was discussed; medications were reviewed and discharge planning was addressed.      ________________________________________________________________________  Total NON critical care TIME:  30   Minutes    Total CRITICAL CARE TIME Spent:   Minutes non procedure based      Comments   >50% of visit spent in counseling and coordination of care x     This includes time during multidisciplinary rounds if indicated above ________________________________________________________________________  Phil Curtis MD     Procedures: see electronic medical records for all procedures/Xrays and details which were not copied into this note but were reviewed prior to creation of Plan. LABS:  I reviewed today's most current labs and imaging studies.   Pertinent labs include:  Recent Labs      11/14/17   0340   WBC  10.8   HGB  8.8*   HCT  28.9*   PLT  166     Recent Labs      11/16/17   0351  11/15/17   0310  11/14/17   0340   NA  133*  137  137   K  5.3*  5.3*  5.0   CL  102  104  105   CO2  23  24  23   GLU  124*  94  79   BUN  85*  71*  57*   CREA  3.20*  2.87*  2.50*   CA  8.7  8.8  8.6

## 2017-11-16 NOTE — CONSULTS
Pt seen and examined. Full consult dictated      Agree with current management. Not much to add. Not a good HD candidate.  ? Palliative consult

## 2017-11-16 NOTE — PROGRESS NOTES
Bedside shift change report given to Neva Malik (oncoming nurse) by Rocío Aragon (offgoing nurse). Report included the following information SBAR.

## 2017-11-17 NOTE — DISCHARGE SUMMARY
Hospitalist Discharge Summary     Patient ID:  Vladimir Andrews  [id number]  89 y.o.  5/31/1927    PCP on record: Geovanna Jones MD    Admit date: 11/13/2017  Discharge date and time: 11/17/2017      DISCHARGE DIAGNOSIS:    PNEUMONIA, chf, ckd CAD, HTN, Sepsis, Encephalopathy      CONSULTATIONS:  IP CONSULT TO CARDIOLOGY  IP CONSULT TO PALLIATIVE CARE - PROVIDER    Excerpted HPI from H&P of Patience Palm MD:  Luciano Grove is a 80 y.o.  AA  female with cad and ckd who presents with sob, cough and phlegm since  The alst 3 days. She reports sob is more exertional with no orthopnea or PND. She also reports cough  With yellow phlegm which is non bloody. She also reports rt sided chest pain, 3/10, increased with breathing. She reports having fever and chills at home. She denies exposure to sick contact. She does report  Increased swelling of legs that base line. She denies long distance, travel, trauma to chest. She denies  Abdominal pain, nausea or vomiting.     On arrival to er she was noted to have fever, leucocytosis and cxr showed pneumonia. She also had  Lactic acid elevation for which she was started on iv fluids per protocol, blood cx were drawn ,abx were  Started.    ______________________________________________________________________  DISCHARGE SUMMARY/HOSPITAL COURSE:  for full details see H&P, daily progress notes, labs, consult notes. Hypotension  BREEZY on CKD 4  -hold diuretics and hydralazine. Did not improve with IVFs.      -Discussed with Renal.  Insert middleton, check renal US and try albumin.       Acute encephalopathy  -suspect related to levaquin (started yesterday) - discontinued     Severe sepsis due to CAP  Acute hypoxic Respiratory Failure POA: 2ry to PNA  -CXR Right midlung and right basilar patchy airspace consolidation  -family has not observed patient to have any problems swallowing   -unable to wean oxygen     CAD / HTN  Chronic systolic HF (EF 87-70%)  -continue asa, isordil, BB, hydralazine and statin  -restart lasix tomorrow.   -mild troponin elevation likely related to initial hypoxia and sepsis in the setting of CKD. Patient had a cardiac cath in  demonstrating severe 3 vessel disease. Rec then was medical management.   Her right sided CP is pleuritic in nature and related to her CAP.      Body mass index is 25.04 kg/(m^2).     Code status: Full  Prophylaxis: Hep SQ  Recommended Disposition: Home w/Family    Patient  on 17 at 2:42 AM  DNR condition was honored          CAUSE OF DEATH  PNEUMONIA  CHRONIC KIDNEY DISEASE  CONGESTIVE HEART FAILURE      _______________________________________________________________________    _______________________________________________________________________  DISCHARGE MEDICATIONS:   Current Discharge Medication List      STOP taking these medications       febuxostat (ULORIC) 80 mg tab tablet Comments:   Reason for Stopping:         furosemide (LASIX) 20 mg tablet Comments:   Reason for Stopping:         acetaminophen (TYLENOL) 325 mg tablet Comments:   Reason for Stopping:         multivit-min-FA-lycopen-lutein 0.4-300-250 mg-mcg-mcg tab Comments:   Reason for Stopping:         ferrous sulfate 325 mg (65 mg iron) cpER Comments:   Reason for Stopping:         nitroglycerin (NITRODUR) 0.4 mg/hr Comments:   Reason for Stopping:         metoprolol succinate (TOPROL-XL) 25 mg XL tablet Comments:   Reason for Stopping:         atorvastatin (LIPITOR) 20 mg tablet Comments:   Reason for Stopping:         BIDIL 20-37.5 mg per tablet Comments:   Reason for Stopping:         aspirin 81 mg tablet Comments:   Reason for Stopping:         ULORIC 80 mg tab tablet Comments:   Reason for Stopping:                     Signed:  Sharyle Saunders, MD

## 2017-11-17 NOTE — ED NOTES
02:00 Code blue paged; arrived to CCU to find patient in extremis in ventricular tachycardia. Electro cardioversion was attempted x4 with conversion lasting a short time. Amiodarone bolus was given in addition to further doses of epinephrine as well as magnesium. Blood glucose was checked given prior insulin administration, with level reported >200. Discussed her poor prognosis with family who decided to make patient DNR. Upon leaving the CCU, pt was noted to have a pulse, in a sinus rhythm with intermittent ectopy and her family is at bedside.

## 2017-11-17 NOTE — PROGRESS NOTES
Spiritual Care Partner Volunteer visited patient in [address] [place] [address] on 11/16/17. Documented by:  ALYSSA Chau

## 2017-11-17 NOTE — PROCEDURES
Central Line Placement    Start time: 11/17/2017 1:17 AM  End time: 11/17/2017 1:35 AM  Performed by: Cornelius Fields  Authorized by: Cornelius Fields     Indications: need for vasopressors and vascular access  Preanesthetic Checklist: patient identified, risks and benefits discussed, anesthesia consent, site marked and patient being monitored      Pre-procedure: All elements of maximal sterile barrier technique followed? Yes    Maximal barrier precautions followed, 2% Chlorhexidine for cutaneous antisepsis and Hand hygiene performed prior to catheter insertion            Procedure:   Prep:  ChloraPrep  Location:  Subclavian  Orientation:  Left  Patient position:  Trendelenburg  Catheter type:  Quad lumen  Catheter size:  8.5 Fr  Catheter length:  20 cm  Number of attempts:  1  Successful placement: Yes      Assessment:   Post-procedure:  Catheter secured and sterile dressing applied  Assessment:  Blood return through all ports  Insertion:  Uncomplicated  Patient tolerance:  Patient tolerated the procedure well with no immediate complications  CXR pending.     Care turned over to covering Attending MD.

## 2017-11-17 NOTE — PROCEDURES
Emergent Intubation  Performed by: Daryl Ely by: Molly Page     Emergent Intubation:   Patient location: 2207. Date/Time:  11/17/2017 1:00 AM  Indications: Other (comment) (Code Blue arrest)    Spontaneous Ventilation: absent    Level of Consciousness: unresponsive  Preoxygenated:  Yes      Airway Documentation:   Airway:  ETT - Cuffed  Technique:  Direct laryngoscopy  Blade Type:  Elen  Blade Size:  3  ETT size (mm):  7.5  ETT Line Rachid:  Lips  ETT Insertion depth (cm):  22  Placement verified by: auscultation, EtCO2 and BBS    Attempts:  1  Difficult airway: No    RSI    + blood tinged sputum in ETT

## 2017-11-17 NOTE — PROGRESS NOTES
Called to pronounce patient. No response to noxious stimuli. No spontaneous breath sounds nor cardiac sounds. Pupils fixed and dilated. TOD: 2:42. Family notified and grieving appropriately.    D/C Summary to be dictated by Attending MD.    ________________________________________________________________________  Angie Gonzalez MD

## 2017-11-17 NOTE — PROGRESS NOTES
Bedside shift change report given to Neva Malik (oncoming nurse) by Lexie Arteaga (offgoing nurse). Report included the following information SBAR.

## 2017-11-17 NOTE — PROGRESS NOTES
Spiritual Care Assessment/Progress Notes    Dorene Ashley [id number]  [id number]    5/31/1927  80 y.o.  female    Patient Telephone Number: [phone] (home)   Advent Affiliation: Irena Muniz   Language: English   Extended Emergency Contact Information  Primary Emergency Contact: [address] [facility] Phone: [phone]  Mobile Phone: [phone]  Relation: Child  Secondary Emergency Contact: [address] Phone: [phone]  Relation: Child   Patient Active Problem List    Diagnosis Date Noted    CAP (community acquired pneumonia) 11/13/2017    Severe sepsis ([facility] [address] [facility] 11/13/2017    Advance directive discussed with patient 11/28/2015    CAD (coronary artery disease), native coronary artery 05/20/2015    Osteopenia 11/20/2014    Gout, chronic, 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    Cerebrovascular disease, arteriosclerotic, post-stroke 05/21/2014    Aphasia 04/03/2013    Right sided weakness 04/03/2013    CKD (chronic kidney disease) stage 3, GFR 30-59 ml/min 04/25/2012    Ischemic cardiomyopathy 12/05/2010    Chronic systolic congestive heart failure ([facility] [address] [facility] 12/04/2010    Spondylosis     Benign hypertension with chronic kidney disease, stage III     Vitamin D deficiency     Glucose intolerance (impaired glucose tolerance)     Hypercholesterolemia         Date: 11/17/2017       Level of Advent/Spiritual Activity:  []      Involved in danilo tradition/spiritual practice    []      Not involved in danilo tradition/spiritual practice    [x]      Spiritually oriented    []      Claims no spiritual orientation    []      Seeking spiritual identity    []      Feels alienated from Temple practice/tradition    []      Feels angry about Temple practice/tradition    []      Spirituality/Temple tradition is not a resource for coping at this time    [x]      Not able to assess due to medical condition        Services Provided Today:  []        crisis intervention []        reading Scriptures   [x]        spiritual assessment [x]        prayer   [x]        empathic listening/emotional support []        rites and rituals (cite in comments)   []        life review []        Mosque support   []        theological development []        advocacy   []        ethical dialog []        blessing   [x]        bereavement support [x]        support to family   []        anticipatory grief support []        help with AMD   []        spiritual guidance []        meditation        Spiritual Care Needs  Spiritual Care Plan   []    Emotional Support []    Follow up visits with pt/family   []    Spiritual/Jewish Care []    Provide materials   []    Loss/Adjustment []    Schedule sacraments   []    Advocacy/Referral /Ethics []    Contact Community Clergy   []    No needs expressed at this time []    Follow up as needed   []    Other: (note in comments) []    Other: (note in comments)     Comments: Responded to page from nursing staff notifying patient's death. Met with daughter and granddaughter and offered support for them. Patient has five children and two of them live out of town. She attended [employer] and is a woman of danilo. Offered a prayer per family's request.     Rev.  Burna Fabry, Chaplain  Paging Service: [phone])

## 2017-11-17 NOTE — PROGRESS NOTES
Patient arrived s/p code blue, intubated. Proceeded to code multiple times. Family made patient a DNR. Patient  at  02:42. Daughter Zeynep Area at bedside. No possessions received from [facility] to give to family. Family uncertain of  home choice at this time. Gave Nursing Office number to family to notify of choice. Remains transferred to [facility].

## 2017-11-17 NOTE — DISCHARGE INSTRUCTIONS
HOSPITALIST DISCHARGE INSTRUCTIONS  NAME: Tash Corral   :  1927   MRN:  [id number]     Date/Time:  2017 7:27 AM    ADMIT DATE: 2017     DISCHARGE DATE: 2017     DIAGNOSIS:   PNEUMONIA, chf, ckd CAD, HTN, Sepsis, Encephalopathy      Patient  on 17  At 2:42AM                                                                                                                                          Physician's or R.N.'s Signature                                                                  Date/Time                                                                                                                                              Patient or Representative Signature                                                          Date/Time

## 2017-11-17 NOTE — PROGRESS NOTES
Bedside and Verbal shift change report given to Joss Rogers (oncoming nurse) by Acacia Lopez (offgoing nurse). Report included the following information SBAR, Procedure Summary, Intake/Output, MAR, Med Rec Status and Cardiac Rhythm Ventriular bigeminy. SHIFT SUMMARY:  1900: Report received from Acacia Lopez. Rina Lazaro stated patient in ventricular bigeminy most of the shift and MD has been notified, also that patient code status has been addressed with MD Gipson: Patient agitated pulling at iv, gown, and leads. Patient also kept removing nasal cannula. Patient stating no pain when asked. 1943: Patient vital stable patient agitated. Patient stating no pain when asked. 9190-1407: Patient was presenting hypothermic with temp ranging around 94* F. Initial tempeture was obtained orally fallowed by axillary. Three different thermometers were used and patient was alert and agitated. When obtaining oral tempeture the patient chewed on tempeture probe. When obtaining axillary tempeture patient would move arm. PCT, Family members, and my self where in room. [id number]: I asked Livia De Leon RN to assist with obtaining a tempeture. An oral and axillary were obtained ranged around 94*F. When obtaining oral tempeture the patient chewed on tempeture probe. When obtaining axillary tempeture patient would move arm. 0040: a rectal tempeture was obtained a ranged around 95* F. Ethel Blas stayed with patient and family while I want to page MD on call. 0050Eurush Willis in formed me that the patient's O2 sat was decreasing and a rapid response need to be called. I returned to patient bed side. 0108: patient became unresponsive code blue called. Family at bedside. Patient code status full.         [facility] NURSING NOTE   Admission Date 11/13/2017   Admission Diagnosis Severe sepsis ([address]  CAP (community acquired pneumonia)   Consults IP CONSULT TO CARDIOLOGY  IP CONSULT TO PALLIATIVE CARE - PROVIDER      Cardiac Monitoring [] Yes [] No Purposeful Hourly Rounding [] Yes    Mariajose Score Total Score: 4   Mariajose score 3 or > [] Bed Alarm [] Avasys [] 1:1 sitter [] Patient refused (Place signed refusal form in chart)   Elton Score Elton Score: 16   Elton score 14 or < [] PMT consult [] Wound Care consult    []  Specialty bed  [] Nutrition consult      Influenza Vaccine Received Flu Vaccine for Current Season (usually Sept-March): Yes           Oxygen needs? [] Room air Oxygen @  []1L    []2L    []3L   []4L    []5L   []6L     Use home O2? [] Yes [] No  Perform O2 challenge test using  smartphrase (.Homeoxygen)      Last bowel movement Last Bowel Movement Date: 11/15/17      Urinary Catheter [REMOVED] Urinary Catheter 11/16/17 Buenrostro-Criteria for Appropriate Use: Obstruction/retention     [REMOVED] Urinary Catheter 11/16/17 Buenrostro-Urine Output (mL): 0 ml     LDAs                                    Readmission Risk Assessment Tool Score Low Risk            12       Total Score        3 Has Seen PCP in Last 6 Months (Yes=3, No=0)    4 IP Visits Last 12 Months (1-3=4, 4=9, >4=11)    5 Pt. Coverage (Medicare=5 , Medicaid, or Self-Pay=4)        Criteria that do not apply:    . Living with Significant Other. Assisted Living. LTAC. SNF.  or   Rehab    Patient Length of Stay (>5 days = 3)    Charlson Comorbidity Score (Age + Comorbid Conditions)       Expected Length of Stay 4d 21h   Actual Length of Stay 4

## 2017-11-17 NOTE — ED NOTES
01:08 Code blue called. I arrived to find the patient unconscious with CPR in progress. She had already received 1 amp of epinephrine. Perfusion noted to be intact with CPR. She was given treatment for hyperkalemia and was intubated. After 3 rounds of CPR the patient was noted to be in a sinus tachycardia with good peripheral pulses and a systolic BP of 744. Patient to be transferred to the CCU with family at bedside.

## 2017-11-17 NOTE — PROGRESS NOTES
Pt increasingly confused and restless today, pulled out IV during the night, IV replaced in left arm, family at bedside. Pt pulled out second IV, unable to replace after several attempts, PICC team notified. Peripheral, #22 placed by PICC team in left arm. Buenrostro placed per MD order, received flash of urine, with no additional output entire shift. Pt's heart rhythm in ventricular bigeminy, with rate from 30's to 120's, MD made aware, cardiology consult placed, Dr. Sheryle Larve saw patient with no further orders placed. Pt continues to be restless and attempting to pull out lines, ordered received for sitter as needed and Avasyst at bedside, daughter also at bedside.

## 2017-11-18 LAB
BACTERIA SPEC CULT: NORMAL
SERVICE CMNT-IMP: NORMAL
